# Patient Record
Sex: FEMALE | Race: BLACK OR AFRICAN AMERICAN | NOT HISPANIC OR LATINO | URBAN - METROPOLITAN AREA
[De-identification: names, ages, dates, MRNs, and addresses within clinical notes are randomized per-mention and may not be internally consistent; named-entity substitution may affect disease eponyms.]

---

## 2019-07-17 ENCOUNTER — HOSPITAL ENCOUNTER (EMERGENCY)
Facility: HOSPITAL | Age: 34
Discharge: HOME | End: 2019-07-17
Attending: EMERGENCY MEDICINE | Admitting: EMERGENCY MEDICINE

## 2019-07-17 VITALS
DIASTOLIC BLOOD PRESSURE: 74 MMHG | HEART RATE: 76 BPM | WEIGHT: 125 LBS | BODY MASS INDEX: 21.34 KG/M2 | HEIGHT: 64 IN | TEMPERATURE: 97.8 F | OXYGEN SATURATION: 98 % | SYSTOLIC BLOOD PRESSURE: 110 MMHG | RESPIRATION RATE: 18 BRPM

## 2019-07-17 DIAGNOSIS — S61.311A LACERATION OF LEFT INDEX FINGER WITHOUT FOREIGN BODY WITH DAMAGE TO NAIL, INITIAL ENCOUNTER: Primary | ICD-10-CM

## 2019-07-17 PROCEDURE — 90715 TDAP VACCINE 7 YRS/> IM: CPT | Performed by: EMERGENCY MEDICINE

## 2019-07-17 PROCEDURE — 99283 EMERGENCY DEPT VISIT LOW MDM: CPT | Performed by: EMERGENCY MEDICINE

## 2019-07-17 PROCEDURE — 90471 IMMUNIZATION ADMIN: CPT

## 2019-07-17 PROCEDURE — 99282 EMERGENCY DEPT VISIT SF MDM: CPT

## 2019-07-17 RX ADMIN — TETANUS TOXOID, REDUCED DIPHTHERIA TOXOID AND ACELLULAR PERTUSSIS VACCINE, ADSORBED 0.5 ML: 5; 2.5; 8; 8; 2.5 SUSPENSION INTRAMUSCULAR at 10:44

## 2019-07-17 NOTE — DISCHARGE INSTRUCTIONS
Please return if you worsening or other concerning symptoms otherwise follow up with occupational medicine as instructed as discussed

## 2019-07-17 NOTE — ED PROVIDER NOTES
History  Chief Complaint   Patient presents with    Finger Laceration     pt with finger laceration to left index finger      50-year-old female right-hand dominant out past medical history here for evaluation of left finger tip laceration  Patient was at work at [employer] she was cutting baking see accidentally sliced the distal tip of her left 2nd finger causing a superficial avulsion to the finger tip and distal nail  Her last tetanus shot was greater than 10 years ago she has some mild discomfort to the tip of her finger only she has no history of diabetes poor wound healing she has no weakness paresthesias or anesthesia her last menstrual period was 2 weeks ago she has no other injuries complaints or concerns otherwise denies a complete review systems as noted  This was identified is work related injury          None       History reviewed  No pertinent past medical history  Past Surgical History:   Procedure Laterality Date     SECTION         History reviewed  No pertinent family history  I have reviewed and agree with the history as documented  Social History     Tobacco Use    Smoking status: Never Smoker    Smokeless tobacco: Never Used   Substance Use Topics    Alcohol use: Never     Frequency: Never    Drug use: Never        Review of Systems   Constitutional: Negative for activity change, appetite change, fatigue and fever  Gastrointestinal: Negative for nausea and vomiting  Genitourinary: Negative for difficulty urinating and menstrual problem  Musculoskeletal: Negative for joint swelling, neck pain and neck stiffness  Left 2nd finger tip laceration   Skin: Positive for wound  Negative for color change, pallor and rash  Neurological: Negative for weakness, light-headedness and numbness  Hematological: Negative for adenopathy  Does not bruise/bleed easily  Psychiatric/Behavioral: Negative for agitation and behavioral problems     All other systems reviewed and are negative  Physical Exam  Physical Exam   Constitutional: She is oriented to person, place, and time  She appears well-developed and well-nourished  No distress  Very well no acute distress   HENT:   Head: Normocephalic and atraumatic  Eyes: Pupils are equal, round, and reactive to light  EOM are normal    Neck: Normal range of motion  Neck supple  No tracheal deviation present  Musculoskeletal:   Complete muscle skeletal exam significant for less than 1 cm complete avulsion of the finger tip of the left distal 2nd finger along the radial aspect of the finger involving the very distal radial aspect of the nail, the entire wound can be visualized is superficial cap refill is brisk sensations intact there is no subungual hematoma there is no active bleeding she has 5/5 strength flexion extension the DI P PIP and MCP joint no other acute ischemic infectious inflammatory traumatic findings on exam   Neurological: She is alert and oriented to person, place, and time  No cranial nerve deficit  She exhibits normal muscle tone  Coordination normal    Skin: Skin is warm and dry  No rash noted  Psychiatric: She has a normal mood and affect  Her behavior is normal    Nursing note and vitals reviewed        Vital Signs  ED Triage Vitals [07/17/19 0954]   Temperature Pulse Respirations Blood Pressure SpO2   97 8 °F (36 6 °C) 76 18 110/74 98 %      Temp Source Heart Rate Source Patient Position - Orthostatic VS BP Location FiO2 (%)   Oral Monitor Sitting Right arm --      Pain Score       5           Vitals:    07/17/19 0954   BP: 110/74   Pulse: 76   Patient Position - Orthostatic VS: Sitting         Visual Acuity      ED Medications  Medications   tetanus-diphtheria-acellular pertussis (BOOSTRIX) IM injection 0 5 mL (0 5 mL Intramuscular Given 7/17/19 1044)       Diagnostic Studies  Results Reviewed     None                 No orders to display              Procedures  Procedures       ED Course  ED Course as of Jul 17 1350   Wed Jul 17, 2019   1034 The patient's wound was cleaned covered with small piece of Surgicel nonstick dressing Occupational Medicine follow-up close return instructions agreeable plan                                  MDM    Disposition  Final diagnoses:   Laceration of left index finger without foreign body with damage to nail, initial encounter     Time reflects when diagnosis was documented in both MDM as applicable and the Disposition within this note     Time User Action Codes Description Comment    7/17/2019 10:35 AM Marco A Wheeler Add [S61 311A] Laceration of left index finger without foreign body with damage to nail, initial encounter       ED Disposition     ED Disposition Condition Date/Time Comment    Discharge Stable Wed Jul 17, 2019 10:35 AM Velora Appl discharge to home/self care  Follow-up Information     Follow up With Specialties Details Why Contact Info Additional [address] [facility] Emergency Department Emergency Medicine  If symptoms worsen [address] [facility] [phone]  [address] [place] [address] ED, [address] [place], [place], [address] [place] [address] Avenue  [address] [place] [address], [facility]          There are no discharge medications for this patient  No discharge procedures on file      ED Provider  Electronically Signed by           Jeri Hawkins DO  07/17/19 6218

## 2021-05-17 ENCOUNTER — APPOINTMENT (EMERGENCY)
Dept: RADIOLOGY | Facility: HOSPITAL | Age: 36
End: 2021-05-17
Payer: COMMERCIAL

## 2021-05-17 ENCOUNTER — HOSPITAL ENCOUNTER (EMERGENCY)
Facility: HOSPITAL | Age: 36
Discharge: HOME | End: 2021-05-17
Attending: EMERGENCY MEDICINE | Admitting: EMERGENCY MEDICINE
Payer: COMMERCIAL

## 2021-05-17 VITALS
SYSTOLIC BLOOD PRESSURE: 112 MMHG | RESPIRATION RATE: 18 BRPM | TEMPERATURE: 98.1 F | OXYGEN SATURATION: 98 % | DIASTOLIC BLOOD PRESSURE: 77 MMHG | HEART RATE: 76 BPM

## 2021-05-17 DIAGNOSIS — M25.561 RIGHT KNEE PAIN: Primary | ICD-10-CM

## 2021-05-17 PROCEDURE — 99284 EMERGENCY DEPT VISIT MOD MDM: CPT | Performed by: PHYSICIAN ASSISTANT

## 2021-05-17 PROCEDURE — 99283 EMERGENCY DEPT VISIT LOW MDM: CPT

## 2021-05-17 PROCEDURE — 73564 X-RAY EXAM KNEE 4 OR MORE: CPT

## 2021-05-17 NOTE — ED NOTES
Discharged reviewed with pt  Pt verbalized understanding and has no further questions at this time  Pt ambulatory off unit with steady gait       Robert Pelaez RN  05/17/21 2294

## 2021-05-17 NOTE — Clinical Note
Ignaciosandee Ulloa was seen and treated in our emergency department on 5/17/2021  Diagnosis:     Jose Hernandez  may return to work on return date  She may return on this date: 05/21/2021         If you have any questions or concerns, please don't hesitate to call        Quincy Baker PA-C    ______________________________           _______________          _______________  Hospital Representative                              Date                                Time

## 2021-05-17 NOTE — Clinical Note
Lolita Beauchamp was seen and treated in our emergency department on 5/17/2021  Diagnosis:     Jayden Monet  may return to work on return date  She may return on this date: 05/21/2021         If you have any questions or concerns, please don't hesitate to call        Satya Black PA-C    ______________________________           _______________          _______________  Hospital Representative                              Date                                Time

## 2021-05-18 NOTE — ED PROVIDER NOTES
History  Chief Complaint   Patient presents with    Knee Pain     R knee swelling and pain x 5 days, has hurt on and off for years, denies injury     38 yo with knee pain  Acute on chronic  States she's been having pain for years but recently worse  Swelling in right knee  Worsens by the end of the day  Pain with flexion and extension but worse on extension  Pain with weight bearing  Stands on feet for prolonged periods of time during the day while at work  No fever, chills, n/v  No rash or redness to joint  History provided by:  Patient   used: No    Knee Pain  Location:  Knee  Time since incident: "years"  Injury: no    Knee location:  R knee  Pain details:     Quality:  Aching    Radiates to:  Does not radiate    Severity:  Moderate    Onset quality:  Gradual    Duration:  1 week    Timing:  Constant    Progression:  Worsening  Chronicity:  New  Dislocation: no    Foreign body present:  No foreign bodies  Prior injury to area:  No  Relieved by:  Rest  Worsened by:  Bearing weight, flexion and extension  Ineffective treatments:  None tried  Associated symptoms: no back pain and no neck pain        None       History reviewed  No pertinent past medical history  Past Surgical History:   Procedure Laterality Date     SECTION         History reviewed  No pertinent family history  I have reviewed and agree with the history as documented  E-Cigarette/Vaping     E-Cigarette/Vaping Substances     Social History     Tobacco Use    Smoking status: Never Smoker    Smokeless tobacco: Never Used   Substance Use Topics    Alcohol use: Never     Frequency: Never    Drug use: Never       Review of Systems   Constitutional: Negative  HENT: Negative for dental problem, ear pain, hearing loss, nosebleeds, tinnitus and trouble swallowing  Eyes: Negative for photophobia, pain and visual disturbance     Respiratory: Negative for apnea, cough, choking, chest tightness, shortness of breath, wheezing and stridor  Gastrointestinal: Negative for abdominal pain, nausea and vomiting  Genitourinary: Negative for decreased urine volume and enuresis  Musculoskeletal: Positive for joint swelling (Right knee)  Negative for arthralgias, back pain, myalgias, neck pain and neck stiffness  Right knee pain   Skin: Negative for pallor, rash and wound  Allergic/Immunologic: Negative  Neurological: Negative for dizziness, syncope, speech difficulty, weakness, light-headedness, numbness and headaches  Physical Exam  Physical Exam  Vitals signs and nursing note reviewed  Constitutional:       General: She is not in acute distress  Appearance: She is well-developed  HENT:      Head: Normocephalic and atraumatic  Eyes:      Conjunctiva/sclera: Conjunctivae normal    Neck:      Musculoskeletal: Neck supple  Cardiovascular:      Rate and Rhythm: Normal rate and regular rhythm  Pulses:           Dorsalis pedis pulses are 2+ on the right side  Heart sounds: No murmur  Pulmonary:      Effort: Pulmonary effort is normal  No respiratory distress  Breath sounds: Normal breath sounds  Abdominal:      Palpations: Abdomen is soft  Tenderness: There is no abdominal tenderness  Musculoskeletal:      Right knee: She exhibits effusion (trace effusion)  She exhibits normal range of motion and no swelling  No tenderness found  No medial joint line, no lateral joint line and no MCL tenderness noted  Legs:    Skin:     General: Skin is warm and dry  Neurological:      Mental Status: She is alert           Vital Signs  ED Triage Vitals [05/17/21 1744]   Temperature Pulse Respirations Blood Pressure SpO2   98 1 °F (36 7 °C) 76 18 112/77 98 %      Temp Source Heart Rate Source Patient Position - Orthostatic VS BP Location FiO2 (%)   Oral Monitor Sitting Left arm --      Pain Score       --           Vitals:    05/17/21 1744   BP: 112/77   Pulse: 76   Patient Position - Orthostatic VS: Sitting         Visual Acuity      ED Medications  Medications - No data to display    Diagnostic Studies  Results Reviewed     None                 XR knee 4+ vw right injury   ED Interpretation by Halie Umanzor PA-C (05/17 1831)   No acute osseous abnormalities  Procedures  Procedures         ED Course                             SBIRT 20yo+      Most Recent Value   SBIRT (24 yo +)   In order to provide better care to our patients, we are screening all of our patients for alcohol and drug use  Would it be okay to ask you these screening questions? Yes Filed at: 05/17/2021 1809   Initial Alcohol Screen: US AUDIT-C    1  How often do you have a drink containing alcohol?  0 Filed at: 05/17/2021 1809   2  How many drinks containing alcohol do you have on a typical day you are drinking? 0 Filed at: 05/17/2021 1809   3b  FEMALE Any Age, or MALE 65+: How often do you have 4 or more drinks on one occassion? 0 Filed at: 05/17/2021 1809   Audit-C Score  0 Filed at: 05/17/2021 1809   JOSEMANUEL: How many times in the past year have you    Used an illegal drug or used a prescription medication for non-medical reasons? Never Filed at: 05/17/2021 1809                    MDM  Number of Diagnoses or Management Options  Right knee pain: new and requires workup  Diagnosis management comments: Differential diagnosis including but not limited to: sprain, strain, fracture, dislocation, contusion; doubt compartment syndrome, lyme, bursitis, arthritis  Plan: XR  Amount and/or Complexity of Data Reviewed  Tests in the radiology section of CPT®: ordered and reviewed  Independent visualization of images, tracings, or specimens: yes    Risk of Complications, Morbidity, and/or Mortality  Presenting problems: low  Management options: low  General comments: 38 yo with knee pain  Acute on chronic  Xr without acute abnormalities  Small effusion  Could be bursitis vs arthritis  Doubt septic joint  Discussed aspiration  At this time there is low suspicion for septic joint  Aspirating the joint for fluid could just result in recurrent effusion  Will have her f/u with ortho  Continue wearing compression stocking  Return parameters provided  Pt understands and agrees with plan  Disposition  Final diagnoses:   Right knee pain     Time reflects when diagnosis was documented in both MDM as applicable and the Disposition within this note     Time User Action Codes Description Comment    5/17/2021  6:36 PM Leilani Schaeffer Add [F36 245] Right knee pain       ED Disposition     ED Disposition Condition Date/Time Comment    Discharge Stable Mon May 17, 2021  6:36 PM Arron Blair discharge to home/self care  Follow-up Information     Follow up With Specialties Details Why Contact Info Additional [address] [facility] Call in 1 day  [address] [facility] [address] [facility] [phone] [facility] [address] [place], [address], [place], [place], [phone]          There are no discharge medications for this patient  No discharge procedures on file      PDMP Review     None          ED Provider  Electronically Signed by           Alvena Eisenmenger, PA-C  05/18/21 0009

## 2021-05-21 ENCOUNTER — OFFICE VISIT (OUTPATIENT)
Dept: OBGYN CLINIC | Facility: CLINIC | Age: 36
End: 2021-05-21
Payer: COMMERCIAL

## 2021-05-21 VITALS
WEIGHT: 136 LBS | DIASTOLIC BLOOD PRESSURE: 72 MMHG | HEIGHT: 64 IN | BODY MASS INDEX: 23.22 KG/M2 | SYSTOLIC BLOOD PRESSURE: 110 MMHG | HEART RATE: 68 BPM

## 2021-05-21 DIAGNOSIS — M25.561 ACUTE PAIN OF RIGHT KNEE: ICD-10-CM

## 2021-05-21 DIAGNOSIS — M22.2X1 PATELLOFEMORAL SYNDROME, RIGHT: Primary | ICD-10-CM

## 2021-05-21 PROCEDURE — 99203 OFFICE O/P NEW LOW 30 MIN: CPT | Performed by: ORTHOPAEDIC SURGERY

## 2021-05-21 PROCEDURE — 20610 DRAIN/INJ JOINT/BURSA W/O US: CPT | Performed by: ORTHOPAEDIC SURGERY

## 2021-05-21 RX ORDER — BUPIVACAINE HYDROCHLORIDE 2.5 MG/ML
2 INJECTION, SOLUTION INFILTRATION; PERINEURAL
Status: COMPLETED | OUTPATIENT
Start: 2021-05-21 | End: 2021-05-21

## 2021-05-21 RX ORDER — MELOXICAM 7.5 MG/1
7.5 TABLET ORAL DAILY
Qty: 30 TABLET | Refills: 1 | Status: SHIPPED | OUTPATIENT
Start: 2021-05-21 | End: 2021-08-15

## 2021-05-21 RX ORDER — METHYLPREDNISOLONE ACETATE 40 MG/ML
2 INJECTION, SUSPENSION INTRA-ARTICULAR; INTRALESIONAL; INTRAMUSCULAR; SOFT TISSUE
Status: COMPLETED | OUTPATIENT
Start: 2021-05-21 | End: 2021-05-21

## 2021-05-21 RX ADMIN — BUPIVACAINE HYDROCHLORIDE 2 ML: 2.5 INJECTION, SOLUTION INFILTRATION; PERINEURAL at 13:28

## 2021-05-21 RX ADMIN — METHYLPREDNISOLONE ACETATE 2 ML: 40 INJECTION, SUSPENSION INTRA-ARTICULAR; INTRALESIONAL; INTRAMUSCULAR; SOFT TISSUE at 13:28

## 2021-05-21 NOTE — PROGRESS NOTES
Orthopaedics Office Visit - New Patient Visit    ASSESSMENT/PLAN:    Assessment:   Right Knee Patellofemoral Syndrome     Plan:   -Discussed with the patient that this can be treated with formal physical therapy, steroid injection and anti inflammatories  -A script for physical therapy was provided to the patient at today's visit  -A script for Meloxicam was sent into pharmacy  Injection given to right knee joint today, tolerated well    To Do Next Visit:  Follow-up in 6-8 weeks     _____________________________________________________  CHIEF COMPLAINT:  Chief Complaint   Patient presents with    Right Knee - Pain         SUBJECTIVE:  Pushpa Ibarra is a 39 y o  female who presents to the office today for an initial evaluation of her right knee  She states that her right knee pain started years ago with no specific mechanism of injury  She states that she works as a  and is on her feet for long periods of time and will experience dull achy pain over the anterior aspect of her right knee  She states that she carries the tray in her right hand  She states that her stay knee will swell at times  She states that she feels a pressure in her knee  She states that she will only experience pain when she is working  She states that she uses a joint medication that is purchased from [employer] to help alleviate her pain  She denies any distal paresthesia  PAST MEDICAL HISTORY:  History reviewed  No pertinent past medical history  PAST SURGICAL HISTORY:  Past Surgical History:   Procedure Laterality Date     SECTION         FAMILY HISTORY:  No family history on file  SOCIAL HISTORY:  Social History     Tobacco Use    Smoking status: Never Smoker    Smokeless tobacco: Never Used   Substance Use Topics    Alcohol use: Never     Frequency: Never    Drug use: Never       MEDICATIONS:  No current outpatient medications on file      ALLERGIES:  No Known Allergies    REVIEW OF SYSTEMS:  MSK: Right Knee pain  Neuro: Intact  Pertinent items are otherwise noted in HPI  A comprehensive review of systems was otherwise negative  LABS:  HgA1c: No results found for: HGBA1C  BMP: No results found for: GLUCOSE, CALCIUM, NA, K, CO2, CL, BUN, CREATININE  CBC: No components found for: CBC    _____________________________________________________  PHYSICAL EXAMINATION:  Vital signs: Ht 5' 4" (1 626 m)   Wt 61 7 kg (136 lb)   BMI 23 34 kg/m²   General: No acute distress, awake and alert  Psychiatric: Mood and affect appear appropriate  HEENT: Trachea Midline, No torticollis, no apparent facial trauma  Cardiovascular: No audible murmurs; Extremities appear perfused  Pulmonary: No audible wheezing or stridor  Skin: No open lesions; see further details (if any) below    MUSCULOSKELETAL EXAMINATION:  Extremities:  Right Knee  TTP patella   No joint line tenderness  Range of motion 0-120 degrees  No crepitus upon evaluation  Negative valgus  Negative Varus  Negative Edwina's  Valgus alignment to bilateral knees  +PFG    _____________________________________________________  STUDIES REVIEWED:  I personally reviewed the images and interpretation is as follows:    X-ray's taken on 5/17/2021 of her right knee demonstrates no acute fracture, dislocation, lytic or blastic lesion  Joint space is well maintained  PROCEDURES PERFORMED:  Large joint arthrocentesis: R knee  Universal Protocol:  Consent: Verbal consent obtained    Risks and benefits: risks, benefits and alternatives were discussed  Consent given by: patient  Site marked: the operative site was not marked  Supporting Documentation  Indications: pain and joint swelling   Procedure Details  Location: knee - R knee  Needle size: 22 G  Ultrasound guidance: no  Approach: anteromedial  Medications administered: 2 mL bupivacaine 0 25 %; 2 mL methylPREDNISolone acetate 40 mg/mL    Patient tolerance: patient tolerated the procedure well with no immediate complications  Dressing: Sterile dressing applied          Scribe Attestation    I,:  Hilaria Levi am acting as a scribe while in the presence of the attending physician :       I,:  Navya Sloan MD personally performed the services described in this documentation    as scribed in my presence :

## 2021-07-13 ENCOUNTER — APPOINTMENT (OUTPATIENT)
Dept: MRI IMAGING | Facility: HOSPITAL | Age: 36
DRG: 043 | End: 2021-07-13
Payer: COMMERCIAL

## 2021-07-13 ENCOUNTER — HOSPITAL ENCOUNTER (INPATIENT)
Facility: HOSPITAL | Age: 36
LOS: 4 days | Discharge: HOME | DRG: 043 | End: 2021-07-18
Attending: EMERGENCY MEDICINE | Admitting: INTERNAL MEDICINE
Payer: COMMERCIAL

## 2021-07-13 ENCOUNTER — APPOINTMENT (EMERGENCY)
Dept: CT IMAGING | Facility: HOSPITAL | Age: 36
DRG: 043 | End: 2021-07-13
Payer: COMMERCIAL

## 2021-07-13 DIAGNOSIS — R93.0 ABNORMAL CT OF THE HEAD: ICD-10-CM

## 2021-07-13 DIAGNOSIS — G35 MS (MULTIPLE SCLEROSIS) (HCC): ICD-10-CM

## 2021-07-13 DIAGNOSIS — R53.1 WEAKNESS: Primary | ICD-10-CM

## 2021-07-13 PROBLEM — R20.2 PARESTHESIA AND PAIN OF RIGHT EXTREMITY: Status: ACTIVE | Noted: 2021-07-13

## 2021-07-13 PROBLEM — M79.609 PARESTHESIA AND PAIN OF RIGHT EXTREMITY: Status: ACTIVE | Noted: 2021-07-13

## 2021-07-13 LAB
ALBUMIN SERPL BCP-MCNC: 3.6 G/DL (ref 3.5–5)
ALP SERPL-CCNC: 50 U/L (ref 46–116)
ALT SERPL W P-5'-P-CCNC: 17 U/L (ref 12–78)
ANION GAP SERPL CALCULATED.3IONS-SCNC: 7 MMOL/L (ref 4–13)
AST SERPL W P-5'-P-CCNC: 13 U/L (ref 5–45)
BASOPHILS # BLD AUTO: 0.03 THOUSANDS/ΜL (ref 0–0.1)
BASOPHILS NFR BLD AUTO: 0 % (ref 0–1)
BILIRUB SERPL-MCNC: 0.37 MG/DL (ref 0.2–1)
BUN SERPL-MCNC: 11 MG/DL (ref 5–25)
CALCIUM SERPL-MCNC: 8.7 MG/DL (ref 8.3–10.1)
CHLORIDE SERPL-SCNC: 105 MMOL/L (ref 100–108)
CO2 SERPL-SCNC: 28 MMOL/L (ref 21–32)
CREAT SERPL-MCNC: 0.8 MG/DL (ref 0.6–1.3)
EOSINOPHIL # BLD AUTO: 0.07 THOUSAND/ΜL (ref 0–0.61)
EOSINOPHIL NFR BLD AUTO: 1 % (ref 0–6)
ERYTHROCYTE [DISTWIDTH] IN BLOOD BY AUTOMATED COUNT: 16.1 % (ref 11.6–15.1)
GFR SERPL CREATININE-BSD FRML MDRD: 109 ML/MIN/1.73SQ M
GLUCOSE SERPL-MCNC: 87 MG/DL (ref 65–140)
GLUCOSE SERPL-MCNC: 96 MG/DL (ref 65–140)
HCT VFR BLD AUTO: 40.4 % (ref 34.8–46.1)
HGB BLD-MCNC: 12.3 G/DL (ref 11.5–15.4)
IMM GRANULOCYTES # BLD AUTO: 0.01 THOUSAND/UL (ref 0–0.2)
IMM GRANULOCYTES NFR BLD AUTO: 0 % (ref 0–2)
LYMPHOCYTES # BLD AUTO: 1.68 THOUSANDS/ΜL (ref 0.6–4.47)
LYMPHOCYTES NFR BLD AUTO: 25 % (ref 14–44)
MCH RBC QN AUTO: 22.2 PG (ref 26.8–34.3)
MCHC RBC AUTO-ENTMCNC: 30.4 G/DL (ref 31.4–37.4)
MCV RBC AUTO: 73 FL (ref 82–98)
MONOCYTES # BLD AUTO: 0.42 THOUSAND/ΜL (ref 0.17–1.22)
MONOCYTES NFR BLD AUTO: 6 % (ref 4–12)
NEUTROPHILS # BLD AUTO: 4.6 THOUSANDS/ΜL (ref 1.85–7.62)
NEUTS SEG NFR BLD AUTO: 68 % (ref 43–75)
NRBC BLD AUTO-RTO: 0 /100 WBCS
PLATELET # BLD AUTO: 262 THOUSANDS/UL (ref 149–390)
PMV BLD AUTO: 10.1 FL (ref 8.9–12.7)
POTASSIUM SERPL-SCNC: 3.8 MMOL/L (ref 3.5–5.3)
PROT SERPL-MCNC: 7.9 G/DL (ref 6.4–8.2)
RBC # BLD AUTO: 5.53 MILLION/UL (ref 3.81–5.12)
SODIUM SERPL-SCNC: 140 MMOL/L (ref 136–145)
WBC # BLD AUTO: 6.81 THOUSAND/UL (ref 4.31–10.16)

## 2021-07-13 PROCEDURE — 99285 EMERGENCY DEPT VISIT HI MDM: CPT

## 2021-07-13 PROCEDURE — 82948 REAGENT STRIP/BLOOD GLUCOSE: CPT

## 2021-07-13 PROCEDURE — 93005 ELECTROCARDIOGRAM TRACING: CPT

## 2021-07-13 PROCEDURE — 70450 CT HEAD/BRAIN W/O DYE: CPT

## 2021-07-13 PROCEDURE — 80053 COMPREHEN METABOLIC PANEL: CPT | Performed by: EMERGENCY MEDICINE

## 2021-07-13 PROCEDURE — A9585 GADOBUTROL INJECTION: HCPCS | Performed by: INTERNAL MEDICINE

## 2021-07-13 PROCEDURE — 36415 COLL VENOUS BLD VENIPUNCTURE: CPT | Performed by: EMERGENCY MEDICINE

## 2021-07-13 PROCEDURE — 85025 COMPLETE CBC W/AUTO DIFF WBC: CPT | Performed by: EMERGENCY MEDICINE

## 2021-07-13 PROCEDURE — 70553 MRI BRAIN STEM W/O & W/DYE: CPT

## 2021-07-13 PROCEDURE — 99220 PR INITIAL OBSERVATION CARE/DAY 70 MINUTES: CPT | Performed by: STUDENT IN AN ORGANIZED HEALTH CARE EDUCATION/TRAINING PROGRAM

## 2021-07-13 PROCEDURE — G1004 CDSM NDSC: HCPCS

## 2021-07-13 PROCEDURE — 99285 EMERGENCY DEPT VISIT HI MDM: CPT | Performed by: EMERGENCY MEDICINE

## 2021-07-13 RX ORDER — LIDOCAINE 50 MG/G
1 PATCH TOPICAL DAILY PRN
Status: DISCONTINUED | OUTPATIENT
Start: 2021-07-13 | End: 2021-07-18 | Stop reason: HOSPADM

## 2021-07-13 RX ORDER — ACETAMINOPHEN 325 MG/1
650 TABLET ORAL EVERY 6 HOURS PRN
Status: DISCONTINUED | OUTPATIENT
Start: 2021-07-13 | End: 2021-07-18 | Stop reason: HOSPADM

## 2021-07-13 RX ADMIN — ENOXAPARIN SODIUM 40 MG: 40 INJECTION SUBCUTANEOUS at 14:07

## 2021-07-13 RX ADMIN — GADOBUTROL 6 ML: 604.72 INJECTION INTRAVENOUS at 15:41

## 2021-07-13 NOTE — PLAN OF CARE
Problem: NEUROSENSORY - ADULT  Goal: Achieves stable or improved neurological status  Description: INTERVENTIONS  - Monitor and report changes in neurological status  - Monitor vital signs such as temperature, blood pressure, glucose, and any other labs ordered   - Initiate measures to prevent increased intracranial pressure  - Monitor for seizure activity and implement precautions if appropriate      7/13/2021 1955 by Jerardo Carlos RN  Outcome: Progressing  7/13/2021 1955 by Jerardo Carlos RN  Outcome: Progressing  Goal: Achieves maximal functionality and self care  Description: INTERVENTIONS  - Monitor swallowing and airway patency with patient fatigue and changes in neurological status  - Encourage and assist patient to increase activity and self care     - Encourage visually impaired, hearing impaired and aphasic patients to use assistive/communication devices  7/13/2021 1955 by Jerardo Carlos RN  Outcome: Progressing  7/13/2021 1955 by Jerardo Carlos RN  Outcome: Progressing     Problem: PAIN - ADULT  Goal: Verbalizes/displays adequate comfort level or baseline comfort level  Description: Interventions:  - Encourage patient to monitor pain and request assistance  - Assess pain using appropriate pain scale  - Administer analgesics based on type and severity of pain and evaluate response  - Implement non-pharmacological measures as appropriate and evaluate response  - Consider cultural and social influences on pain and pain management  - Notify physician/advanced practitioner if interventions unsuccessful or patient reports new pain  7/13/2021 1955 by Jerardo Carlos RN  Outcome: Progressing  7/13/2021 1955 by Jerardo Carlos RN  Outcome: Progressing     Problem: DISCHARGE PLANNING  Goal: Discharge to home or other facility with appropriate resources  Description: INTERVENTIONS:  - Identify barriers to discharge w/patient and caregiver  - Arrange for needed discharge resources and transportation as appropriate  - Identify discharge learning needs (meds, wound care, etc )  - Arrange for interpretive services to assist at discharge as needed  - Refer to Case Management Department for coordinating discharge planning if the patient needs post-hospital services based on physician/advanced practitioner order or complex needs related to functional status, cognitive ability, or social support system  7/13/2021 1955 by Halina Taveras RN  Outcome: Progressing  7/13/2021 1955 by Halina Taveras RN  Outcome: Progressing     Problem: Knowledge Deficit  Goal: Patient/family/caregiver demonstrates understanding of disease process, treatment plan, medications, and discharge instructions  Description: Complete learning assessment and assess knowledge base    Interventions:  - Provide teaching at level of understanding  - Provide teaching via preferred learning methods  7/13/2021 1955 by Halina Taveras RN  Outcome: Progressing  7/13/2021 1955 by Halina Taveras RN  Outcome: Progressing

## 2021-07-13 NOTE — PLAN OF CARE
Problem: NEUROSENSORY - ADULT  Goal: Achieves stable or improved neurological status  Description: INTERVENTIONS  - Monitor and report changes in neurological status  - Monitor vital signs such as temperature, blood pressure, glucose, and any other labs ordered   - Initiate measures to prevent increased intracranial pressure  - Monitor for seizure activity and implement precautions if appropriate      Outcome: Progressing  Goal: Achieves maximal functionality and self care  Description: INTERVENTIONS  - Monitor swallowing and airway patency with patient fatigue and changes in neurological status  - Encourage and assist patient to increase activity and self care     - Encourage visually impaired, hearing impaired and aphasic patients to use assistive/communication devices  Outcome: Progressing     Problem: PAIN - ADULT  Goal: Verbalizes/displays adequate comfort level or baseline comfort level  Description: Interventions:  - Encourage patient to monitor pain and request assistance  - Assess pain using appropriate pain scale  - Administer analgesics based on type and severity of pain and evaluate response  - Implement non-pharmacological measures as appropriate and evaluate response  - Consider cultural and social influences on pain and pain management  - Notify physician/advanced practitioner if interventions unsuccessful or patient reports new pain  Outcome: Progressing     Problem: DISCHARGE PLANNING  Goal: Discharge to home or other facility with appropriate resources  Description: INTERVENTIONS:  - Identify barriers to discharge w/patient and caregiver  - Arrange for needed discharge resources and transportation as appropriate  - Identify discharge learning needs (meds, wound care, etc )  - Arrange for interpretive services to assist at discharge as needed  - Refer to Case Management Department for coordinating discharge planning if the patient needs post-hospital services based on physician/advanced practitioner order or complex needs related to functional status, cognitive ability, or social support system  Outcome: Progressing     Problem: Knowledge Deficit  Goal: Patient/family/caregiver demonstrates understanding of disease process, treatment plan, medications, and discharge instructions  Description: Complete learning assessment and assess knowledge base    Interventions:  - Provide teaching at level of understanding  - Provide teaching via preferred learning methods  Outcome: Progressing

## 2021-07-13 NOTE — ED PROVIDER NOTES
Pt Name: Jessica Archibald  MRN: [id number]  [place] 1985  Age/Sex: 39 y o  female  Date of evaluation: 2021  PCP: Mariluz Mak MD    [address] [place] [address]    Chief Complaint   Patient presents with    Numbness     Patient c/o right arm and leg numbness for the last 4 days  Patient states 3 episodes yesterday and 2 the day before  Patient states symptoms come and go  Patient states she experienced similar symptoms in 2018  HPI    39 y o  female presenting with right arm and leg numbness and weakness over the last 4 days  Patient states that the symptoms, with no clear provocation, last 30 seconds to 2 minute, then resolved  States she had 2 episodes 2 days ago, 3 episodes yesterday, 4 episodes so far today  She notes a history of similar symptoms 3 years ago, states that she never sought care and resolved after about 2 months  She denies fever, nausea, vomiting, diarrhea changes in speech or vision, trauma, headache, other symptoms  HPI      Past Medical and Surgical History    History reviewed  No pertinent past medical history  Past Surgical History:   Procedure Laterality Date     SECTION         History reviewed  No pertinent family history  Social History     Tobacco Use    Smoking status: Never Smoker    Smokeless tobacco: Never Used   Substance Use Topics    Alcohol use: Never    Drug use: Never           Allergies    No Known Allergies    Home Medications    Prior to Admission medications    Medication Sig Start Date End Date Taking? Authorizing Provider   meloxicam (MOBIC) 7 5 mg tablet Take 1 tablet (7 5 mg total) by mouth daily 21   Otto Meraz MD           Review of Systems    Review of Systems   Constitutional: Negative for activity change, chills and fever  HENT: Negative for drooling and facial swelling  Eyes: Negative for pain, discharge and visual disturbance     Respiratory: Negative for apnea, cough, chest tightness, shortness of breath and wheezing  Cardiovascular: Negative for chest pain and leg swelling  Gastrointestinal: Negative for abdominal pain, constipation, diarrhea, nausea and vomiting  Genitourinary: Negative for difficulty urinating, dysuria and urgency  Musculoskeletal: Negative for arthralgias, back pain and gait problem  Skin: Negative for color change and rash  Neurological: Positive for weakness and numbness  Negative for dizziness, speech difficulty and headaches  Psychiatric/Behavioral: Negative for agitation, behavioral problems and confusion  All other systems reviewed and negative  Physical Exam      ED Triage Vitals   Temperature Pulse Respirations Blood Pressure SpO2   07/13/21 0835 07/13/21 0835 07/13/21 0835 07/13/21 0835 07/13/21 0835   98 7 °F (37 1 °C) 72 18 126/68 100 %      Temp Source Heart Rate Source Patient Position - Orthostatic VS BP Location FiO2 (%)   07/13/21 0835 07/13/21 0835 07/13/21 0835 07/13/21 0835 --   Oral Monitor Sitting Left arm       Pain Score       07/13/21 1300       No Pain               Physical Exam  Vitals and nursing note reviewed  Constitutional:       Appearance: She is well-developed  HENT:      Head: Normocephalic and atraumatic  Right Ear: External ear normal       Left Ear: External ear normal    Eyes:      Conjunctiva/sclera: Conjunctivae normal       Pupils: Pupils are equal, round, and reactive to light  Cardiovascular:      Rate and Rhythm: Normal rate and regular rhythm  Heart sounds: Normal heart sounds  Pulmonary:      Effort: Pulmonary effort is normal  No respiratory distress  Breath sounds: Normal breath sounds  No wheezing or rales  Abdominal:      General: There is no distension  Palpations: Abdomen is soft  Tenderness: There is no abdominal tenderness  There is no guarding or rebound  Musculoskeletal:         General: No deformity  Normal range of motion        Cervical back: Normal range of motion and neck supple  Skin:     General: Skin is warm and dry  Findings: No erythema or rash  Neurological:      Mental Status: She is alert and oriented to person, place, and time  Cranial Nerves: No cranial nerve deficit  Sensory: No sensory deficit  Motor: No weakness  Coordination: Coordination normal       Gait: Gait normal    Psychiatric:         Behavior: Behavior normal          Thought Content:  Thought content normal          Judgment: Judgment normal               Diagnostic Results      Labs:    Results Reviewed     Procedure Component Value Units Date/Time    POCT pregnancy, urine [[id number]]     Lab Status: No result     Comprehensive metabolic panel [[id number]] Collected: 07/13/21 1015    Lab Status: Final result Specimen: Blood from Arm, Right Updated: 07/13/21 1106     Sodium 140 mmol/L      Potassium 3 8 mmol/L      Chloride 105 mmol/L      CO2 28 mmol/L      ANION GAP 7 mmol/L      BUN 11 mg/dL      Creatinine 0 80 mg/dL      Glucose 96 mg/dL      Calcium 8 7 mg/dL      AST 13 U/L      ALT 17 U/L      Alkaline Phosphatase 50 U/L      Total Protein 7 9 g/dL      Albumin 3 6 g/dL      Total Bilirubin 0 37 mg/dL      eGFR 109 ml/min/1 73sq m     Narrative:      Roberto guidelines for Chronic Kidney Disease (CKD):     Stage 1 with normal or high GFR (GFR > 90 mL/min/1 73 square meters)    Stage 2 Mild CKD (GFR = 60-89 mL/min/1 73 square meters)    Stage 3A Moderate CKD (GFR = 45-59 mL/min/1 73 square meters)    Stage 3B Moderate CKD (GFR = 30-44 mL/min/1 73 square meters)    Stage 4 Severe CKD (GFR = 15-29 mL/min/1 73 square meters)    Stage 5 End Stage CKD (GFR <15 mL/min/1 73 square meters)  Note: GFR calculation is accurate only with a steady state creatinine    CBC and differential [[id number]]  (Abnormal) Collected: 07/13/21 1015    Lab Status: Final result Specimen: Blood from Arm, Right Updated: 07/13/21 1030     WBC 6 81 Thousand/uL      RBC 5 53 Million/uL      Hemoglobin 12 3 g/dL      Hematocrit 40 4 %      MCV 73 fL      MCH 22 2 pg      MCHC 30 4 g/dL      RDW 16 1 %      MPV 10 1 fL      Platelets 424 Thousands/uL      nRBC 0 /100 WBCs      Neutrophils Relative 68 %      Immat GRANS % 0 %      Lymphocytes Relative 25 %      Monocytes Relative 6 %      Eosinophils Relative 1 %      Basophils Relative 0 %      Neutrophils Absolute 4 60 Thousands/µL      Immature Grans Absolute 0 01 Thousand/uL      Lymphocytes Absolute 1 68 Thousands/µL      Monocytes Absolute 0 42 Thousand/µL      Eosinophils Absolute 0 07 Thousand/µL      Basophils Absolute 0 03 Thousands/µL           All labs reviewed and utilized in the medical decision making process    Radiology:    CT head without contrast   Final Result      No acute intracranial abnormality  Few scattered foci of periventricular white matter low attenuation  In this age group, this is nonspecific, and may be secondary to microangiopathic change, migraines, a post infectious/post inflammatory in etiology, or demyelinating disease, among    other causes  Given the clinical history, a demyelinating disease is of some concern  Recommend neurology evaluation an MRI of the brain when clinically appropriate  The study was marked in [facility] for immediate notification  Workstation performed: [id number]         MRI brain w wo contrast    (Results Pending)       All radiology studies independently viewed by me and interpreted by the radiologist     Procedure    Procedures        ED Course of Care and Re-Assessments      Lab workup reassuring, CT abnormal as above  Discussed case with Dr Maite Boyle of Neurology who recommended admission for EEG and MRI brain with and without with concern for demyelinating disease versus possible complex partial seizures  Patient amenable to that plan      Medications   enoxaparin (LOVENOX) subcutaneous injection 40 mg (40 mg Subcutaneous Given 7/13/21 7487)   lidocaine (LIDODERM) 5 % patch 1 patch (has no administration in time range)   Gadobutrol injection (SINGLE-DOSE) SOLN 6 mL (6 mL Intravenous Given 7/13/21 1541)           FINAL IMPRESSION    Final diagnoses:   Weakness   Abnormal CT of the head         DISPOSITION/PLAN    Presentation as above with weakness and numbness and abnormal CT of the head  Some concern for demyelinating disease including MS verses possible complex partial seizures with non organic cause also on differential   After initial workup and observation emergency department, admitted Internal Medicine for further care with Neurology consult, hemodynamically stable comfortable that time  Time reflects when diagnosis was documented in both MDM as applicable and the Disposition within this note     Time User Action Codes Description Comment    7/13/2021 11:22 AM Massiel CRAFT Add [R53 1] Weakness     7/13/2021 11:22 AM Massiel CRAFT Add [R93 0] Abnormal CT of the head       ED Disposition     ED Disposition Condition Date/Time Comment    Admit Stable Tue Jul 13, 2021 11:42 AM Case was discussed with JOAN and the patient's admission status was agreed to be Admission Status: observation status to the service of Dr Mariza Rust   Follow-up Information    None           PATIENT REFERRED TO:    No follow-up provider specified  DISCHARGE MEDICATIONS:    Patient's Medications   Discharge Prescriptions    No medications on file       No discharge procedures on file           MD Mona Woo MD  07/13/21 1894

## 2021-07-13 NOTE — H&P
[address] [facility]  H&P- [facility] 1985, 39 y o  female MRN: [id number]  Unit/Bed#: ED 09 Encounter: [id number]  Primary Care Provider: Cornell Harris MD   Date and time admitted to hospital: 7/13/2021  9:41 AM    * Paresthesia and pain of right upper and lower extremity  Assessment & Plan   Presented with complaint of intermittent right sided parasthesia and hemiparesis x 5 days  o episodic, lasting approximately few seconds to 1 minute  o Progressively increasing in frequency, with 6 episodes today  o spontaneously resolve  o first starts in fingers/R  knee and then radiate up the limb  o Associated with cramping in fingers of the R UE    o Asymptomatic at time of presentation   Episodes Not associated with headache, dizziness, LOC, changes in vision, chest pain, nausea, vomiting, diaphoresis, no loss of bowel or bladder control during these episodes; denies any typical aura like symptoms   Reports had similar issue in 2018; episodes persisted for a few months and then spontaneously resolved, have not reoccurred until the last 5 days   Neuro exam findings: A x O x 3; no focal neurological deficits during my visit   CT head findings: no acute intracranial abnormality; few scattered foci of periventricular white matter low attenuation   Possible MS? vs complex partial seizure?  MRI brain w/wo contrast ordered   [facility] Neu[facility] consulted by ED provider; per hand off, neuro order has recommended MRI w/wo contrast and EEG  o Order placed for MRI and EEG; pending   Official neuro eval pending          Patellofemoral syndrome, right  Assessment & Plan  · Known  · Stable    VTE Prophylaxis: Enoxaparin (Lovenox)  / sequential compression device   Code Status: Full code  POLST: POLST form is not discussed and not completed at this time    Discussion with family:  present at bedside    Anticipated Length of Stay:  Patient will be admitted on an Observation basis with an anticipated length of stay of  Less than 2 midnights  Justification for Hospital Stay: r/o seizure vs demyelinating disorder     Total Time for Visit, including Counseling / Coordination of Care: 45 minutes  Greater than 50% of this total time spent on direct patient counseling and coordination of care  Chief Complaint:   R  Sided UE and LE parasthesias and weakness    History of Present Illness:    Yordan Pleitez is a 39 y o  female with no significant PMH who presents with c/o intermittent R  Sided UE and LE paraesthesias and weakness  Episodes last a few seconds to usually a minute  Describes the episodes as starting in the fingers of R UE and R  Knee, then progressively ascend until entire limb is effected  Denies any headache, neck pain, LOC, dizziness, loss of bowel or bladder control, vision abnormalities, chest pain, nausea, vomiting associated with these episodes  Episodes usually spontaneously resolve  She has not noticed any alleviating or aggravating factors  Has had similar episodes approximately 2 years ago  Those episodes spontaneously resolved and have not reoccurred until this time  No Previous history of seizures or multiple sclerosis  No similar complaints in family  Review of Systems:    Review of Systems   Constitutional: Negative for appetite change, fever and unexpected weight change  HENT: Negative for congestion and sinus pain  Eyes: Negative for visual disturbance  Respiratory: Negative for cough, chest tightness and shortness of breath  Cardiovascular: Negative for chest pain, palpitations and leg swelling  Gastrointestinal: Negative for abdominal pain, constipation, diarrhea, nausea and vomiting  Genitourinary: Negative for dysuria  Musculoskeletal: Negative for back pain, joint swelling, myalgias, neck pain and neck stiffness  Neurological: Positive for weakness and numbness  Negative for dizziness and headaches     Psychiatric/Behavioral: Negative for agitation, behavioral problems and confusion  Past Medical and Surgical History:     History reviewed  No pertinent past medical history  Past Surgical History:   Procedure Laterality Date     SECTION         Meds/Allergies:    Prior to Admission medications    Medication Sig Start Date End Date Taking? Authorizing Provider   meloxicam (MOBIC) 7 5 mg tablet Take 1 tablet (7 5 mg total) by mouth daily 21   Sakshi Singh MD     I have reviewed home medications with patient personally  Allergies: No Known Allergies    Social History:     Marital Status: /Civil Union   Occupation:    Patient Pre-hospital Living Situation:  Home  Patient Pre-hospital Level of Mobility:  Independent  Patient Pre-hospital Diet Restrictions:  None  Substance Use History:   Social History     Substance and Sexual Activity   Alcohol Use Never     Social History     Tobacco Use   Smoking Status Never Smoker   Smokeless Tobacco Never Used     Social History     Substance and Sexual Activity   Drug Use Never       Family History:    History reviewed  No pertinent family history  Physical Exam:     Vitals:   Blood Pressure: 126/68 (21)  Pulse: 72 (21)  Temperature: 98 7 °F (37 1 °C) (21)  Temp Source: Oral (21)  Respirations: 18 (21)  Height: 5' 1" (154 9 cm) (21)  Weight - Scale: 62 1 kg (137 lb) (21)  SpO2: 100 % (21)    Physical Exam  Constitutional:       General: She is not in acute distress  Appearance: She is not ill-appearing or toxic-appearing  HENT:      Head: Normocephalic and atraumatic  Eyes:      General: No scleral icterus  Right eye: No discharge  Left eye: No discharge  Extraocular Movements: Extraocular movements intact  Cardiovascular:      Rate and Rhythm: Normal rate and regular rhythm  Pulses: Normal pulses  Heart sounds: Normal heart sounds     Pulmonary:      Effort: Pulmonary effort is normal       Breath sounds: Normal breath sounds  Abdominal:      General: Bowel sounds are normal  There is no distension  Palpations: Abdomen is soft  Tenderness: There is no abdominal tenderness  Musculoskeletal:         General: Normal range of motion  Cervical back: Normal range of motion  Skin:     General: Skin is warm and dry  Neurological:      Mental Status: She is alert and oriented to person, place, and time  Mental status is at baseline  Comments: Alert and oriented  Gait WNL  Motor at this time: 5/5 in bilateral UE and LE  No sensory deficits currently   Psychiatric:         Mood and Affect: Mood normal          Thought Content: Thought content normal          Additional Data:     Lab Results: I have personally reviewed pertinent reports  Results from last 7 days   Lab Units 07/13/21  1015   WBC Thousand/uL 6 81   HEMOGLOBIN g/dL 12 3   HEMATOCRIT % 40 4   PLATELETS Thousands/uL 262   NEUTROS PCT % 68   LYMPHS PCT % 25   MONOS PCT % 6   EOS PCT % 1     Results from last 7 days   Lab Units 07/13/21  1015   SODIUM mmol/L 140   POTASSIUM mmol/L 3 8   CHLORIDE mmol/L 105   CO2 mmol/L 28   BUN mg/dL 11   CREATININE mg/dL 0 80   ANION GAP mmol/L 7   CALCIUM mg/dL 8 7   ALBUMIN g/dL 3 6   TOTAL BILIRUBIN mg/dL 0 37   ALK PHOS U/L 50   ALT U/L 17   AST U/L 13   GLUCOSE RANDOM mg/dL 96                       Imaging: I have personally reviewed pertinent reports  CT head without contrast   Final Result by Jaziel Bowen DO (07/13 1100)      No acute intracranial abnormality  Few scattered foci of periventricular white matter low attenuation  In this age group, this is nonspecific, and may be secondary to microangiopathic change, migraines, a post infectious/post inflammatory in etiology, or demyelinating disease, among    other causes  Given the clinical history, a demyelinating disease is of some concern    Recommend neurology evaluation an MRI of the brain when clinically appropriate  The study was marked in [facility] for immediate notification  Workstation performed: [id number]         MRI brain w wo contrast    (Results Pending)       EKG, Pathology, and Other Studies Reviewed on Admission:   · EKG: no acute ST-T wave changes    Allscripts / Epic Records Reviewed: Yes     ** Please Note: This note has been constructed using a voice recognition system   **

## 2021-07-13 NOTE — ASSESSMENT & PLAN NOTE
 Presented with complaint of intermittent right sided parasthesia and hemiparesis x 5 days  o episodic, lasting approximately few seconds to 1 minute  o Progressively increasing in frequency, with 6 episodes today  o spontaneously resolve  o first starts in fingers/R  knee and then radiate up the limb  o Associated with cramping in fingers of the R UE    o Asymptomatic at time of presentation   Episodes Not associated with headache, dizziness, LOC, changes in vision, chest pain, nausea, vomiting, diaphoresis, no loss of bowel or bladder control during these episodes; denies any typical aura like symptoms   Reports had similar issue in 2018; episodes persisted for a few months and then spontaneously resolved, have not reoccurred until the last 5 days   Neuro exam findings: A x O x 3; no focal neurological deficits during my visit   CT head findings: no acute intracranial abnormality; few scattered foci of periventricular white matter low attenuation   Possible MS? vs complex partial seizure?      MRI brain w/wo contrast ordered   [facility] Neurology consulted by ED provider; per hand off, neuro order has recommended MRI w/wo contrast and EEG  o Order placed for MRI and EEG; pending   Official neuro eval pending

## 2021-07-14 ENCOUNTER — APPOINTMENT (OUTPATIENT)
Dept: INTERVENTIONAL RADIOLOGY/VASCULAR | Facility: HOSPITAL | Age: 36
DRG: 043 | End: 2021-07-14
Payer: COMMERCIAL

## 2021-07-14 ENCOUNTER — APPOINTMENT (OUTPATIENT)
Dept: MRI IMAGING | Facility: HOSPITAL | Age: 36
DRG: 043 | End: 2021-07-14
Payer: COMMERCIAL

## 2021-07-14 PROBLEM — G35 MS (MULTIPLE SCLEROSIS) (HCC): Status: ACTIVE | Noted: 2021-07-13

## 2021-07-14 LAB
APPEARANCE CSF: CLEAR
APTT PPP: 33 SECONDS (ref 23–37)
GLUCOSE CSF-MCNC: 62 MG/DL (ref 50–80)
GLUCOSE SERPL-MCNC: 101 MG/DL (ref 65–140)
GLUCOSE SERPL-MCNC: 109 MG/DL (ref 65–140)
GLUCOSE SERPL-MCNC: 113 MG/DL (ref 65–140)
GRAM STN SPEC: NORMAL
GRAM STN SPEC: NORMAL
LYMPHOCYTES NFR CSF MANUAL: 88 %
MONOS+MACROS CSF MANUAL: 12 %
PROT CSF-MCNC: 72 MG/DL (ref 15–45)
RBC # CSF MANUAL: 15 UL (ref 0–10)
TOTAL CELLS COUNTED BLD: NO
TOTAL CELLS COUNTED SPEC: 52
TUBE # CSF: 4
WBC # CSF AUTO: 28 /UL (ref 0–5)

## 2021-07-14 PROCEDURE — 87070 CULTURE OTHR SPECIMN AEROBIC: CPT | Performed by: PHYSICIAN ASSISTANT

## 2021-07-14 PROCEDURE — 82784 ASSAY IGA/IGD/IGG/IGM EACH: CPT | Performed by: PHYSICIAN ASSISTANT

## 2021-07-14 PROCEDURE — 82948 REAGENT STRIP/BLOOD GLUCOSE: CPT

## 2021-07-14 PROCEDURE — 83520 IMMUNOASSAY QUANT NOS NONAB: CPT | Performed by: PHYSICIAN ASSISTANT

## 2021-07-14 PROCEDURE — 62328 DX LMBR SPI PNXR W/FLUOR/CT: CPT | Performed by: RADIOLOGY

## 2021-07-14 PROCEDURE — 99222 1ST HOSP IP/OBS MODERATE 55: CPT | Performed by: PSYCHIATRY & NEUROLOGY

## 2021-07-14 PROCEDURE — 99232 SBSQ HOSP IP/OBS MODERATE 35: CPT | Performed by: STUDENT IN AN ORGANIZED HEALTH CARE EDUCATION/TRAINING PROGRAM

## 2021-07-14 PROCEDURE — 89050 BODY FLUID CELL COUNT: CPT | Performed by: PHYSICIAN ASSISTANT

## 2021-07-14 PROCEDURE — 83916 OLIGOCLONAL BANDS: CPT | Performed by: PHYSICIAN ASSISTANT

## 2021-07-14 PROCEDURE — 009U3ZX DRAINAGE OF SPINAL CANAL, PERCUTANEOUS APPROACH, DIAGNOSTIC: ICD-10-PCS | Performed by: RADIOLOGY

## 2021-07-14 PROCEDURE — 62270 DX LMBR SPI PNXR: CPT

## 2021-07-14 PROCEDURE — 72157 MRI CHEST SPINE W/O & W/DYE: CPT

## 2021-07-14 PROCEDURE — 82040 ASSAY OF SERUM ALBUMIN: CPT | Performed by: PHYSICIAN ASSISTANT

## 2021-07-14 PROCEDURE — G1004 CDSM NDSC: HCPCS

## 2021-07-14 PROCEDURE — 85730 THROMBOPLASTIN TIME PARTIAL: CPT | Performed by: PHYSICIAN ASSISTANT

## 2021-07-14 PROCEDURE — 84157 ASSAY OF PROTEIN OTHER: CPT | Performed by: PHYSICIAN ASSISTANT

## 2021-07-14 PROCEDURE — 82945 GLUCOSE OTHER FLUID: CPT | Performed by: PHYSICIAN ASSISTANT

## 2021-07-14 PROCEDURE — A9585 GADOBUTROL INJECTION: HCPCS | Performed by: PHYSICIAN ASSISTANT

## 2021-07-14 PROCEDURE — 82042 OTHER SOURCE ALBUMIN QUAN EA: CPT | Performed by: PHYSICIAN ASSISTANT

## 2021-07-14 PROCEDURE — 72156 MRI NECK SPINE W/O & W/DYE: CPT

## 2021-07-14 PROCEDURE — 83873 ASSAY OF CSF PROTEIN: CPT | Performed by: PHYSICIAN ASSISTANT

## 2021-07-14 PROCEDURE — 89051 BODY FLUID CELL COUNT: CPT | Performed by: PHYSICIAN ASSISTANT

## 2021-07-14 RX ORDER — LIDOCAINE WITH 8.4% SOD BICARB 0.9%(10ML)
SYRINGE (ML) INJECTION CODE/TRAUMA/SEDATION MEDICATION
Status: COMPLETED | OUTPATIENT
Start: 2021-07-14 | End: 2021-07-14

## 2021-07-14 RX ORDER — SODIUM CHLORIDE 9 MG/ML
100 INJECTION, SOLUTION INTRAVENOUS CONTINUOUS
Status: DISCONTINUED | OUTPATIENT
Start: 2021-07-14 | End: 2021-07-16

## 2021-07-14 RX ADMIN — ACETAMINOPHEN 650 MG: 325 TABLET, FILM COATED ORAL at 17:53

## 2021-07-14 RX ADMIN — SODIUM CHLORIDE 1000 MG: 0.9 INJECTION, SOLUTION INTRAVENOUS at 17:22

## 2021-07-14 RX ADMIN — GADOBUTROL 6 ML: 604.72 INJECTION INTRAVENOUS at 20:03

## 2021-07-14 RX ADMIN — SODIUM CHLORIDE 100 ML/HR: 0.9 INJECTION, SOLUTION INTRAVENOUS at 15:47

## 2021-07-14 RX ADMIN — Medication 3 ML: at 15:16

## 2021-07-14 NOTE — DISCHARGE INSTRUCTIONS
Lumbar Puncture     WHAT YOU NEED TO KNOW:   Lumbar puncture (LP) is a procedure in which a needle is inserted in your back and into your spinal canal  This is usually done to collect cerebrospinal fluid (CSF) to check for an infection, inflammation, bleeding, or other conditions that affect the brain  CSF is a clear, protective fluid that flows around the brain and inside the spinal canal  LP may also be done to remove CSF to reduce pressure in the brain  DISCHARGE INSTRUCTIONS:     Follow up with your healthcare provider as directed: Write down your questions so you remember to ask them during your visits  Post-lumbar puncture headache: You may develop a headache during the first few hours after your LP that may last for several days  The headache may be mild to severe and may get worse when you sit or stand  The following may help ease a post-lumbar puncture headache:  · Drink plenty of liquids: You should drink more liquid than usual after your LP  Ask how much liquid is right for you  Caffeine may be used to treat a headache  Drinks, such as coffee, tea, or some sodas, have caffeine  Ask a Do not drink alcohol  · Lie down: If you have a headache after your lumbar puncture, it may be helpful to lie down and rest   · You may have a slight soreness over the LP area  This is normal   · Remove the band aid or dressing in 24 hours  · Contact Interventional Radiology imediately  at [phone] [place] PATIENTS: Contact Interventional Radiology at [phone]) Shira Chahal PATIENTS: Contact Interventional Radiology at [phone]) if any of the following occur:  · You have a severe headache that does not get better after you lie down  · Persistent nausea or vomiting   · You have a fever  · You have a stiff neck or have trouble thinking clearly  · Your legs, feet, or other parts below the waist feel numb, tingly, or weak     · You have bleeding or a discharge coming from the area where the needle was put into your back  · You have severe pain in your back or neck

## 2021-07-14 NOTE — ASSESSMENT & PLAN NOTE
Bharathi Goyal is a 39 y o  female with migraines and anemia who presents to [place] 07/13/2021 with recurrent episodes of right sided numbness/tingling followed by stiffness  Scattered supratentorial ring enhancing lesions suggesting active demyelination as well as extensive white matter disease suggesting advanced demyelinating disease of MS on MRI brain  LP revealed elevated total protein of 72, WBC of 28 (Lymph predominant), and patient started on IV steroids  Cannot rule out seizures as the etiology of the events patient has been experiencing, routine EEG pending  Plan:  - Routine EEG pending   - S/p LP, CSF labs pending:   · MS panel, NMO IgG autoantibody   - Serum labs pending: MS panel   - Continue IV Solumedrol 1000 mg daily x 5 days, today is day 2/5  - Will hold off on AEDs at this time   - Medical management per primary team, notify with changes    Imaging/Labs:  - CT head 07/13/2021:  · No acute intracranial abnormalities  · Few scattered foci of periventricular white matter low attenuation, may be secondary to microangiopathic changes, migraines, post infection/post inflammatory, or demyelinating disease  - MRI brain 07/13/2021:  · Extensive white matter disease suggesting advanced demyelinating disease of multiple sclerosis  · Scattered supratentorial ring enhancing lesions suggest active demyelination  - MRI C-spine:  · Multifocal cervical spinal cord signal abnormalities most consistent with demyelinating disease  No active demyelination or cord atrophy noted  · Multilevel mild canal and foraminal narrowing more pronounced at level C4-5  - MRI T-spine:  · Patchy cord signal abnormality a level T8-9 most consistent with demyelinating disease    No active demyelination or cord atrophy noted  - S/p LP 07/14/2021:  · Total protein elevated 72  · WBC count elevated 28, lymphocyte predominant 88%  · RBC count elevated 15  · CSF results WNL: Glucose, Gram stain, culture

## 2021-07-14 NOTE — UTILIZATION REVIEW
OBSERVATION 7/13/21 @ 1206 CONVERTED TO INPATIENT 7/14/21 @1851 DUE TO LIKELY NEW DIAGNOSIS MS  REQUIRING   LP AND IV STEROIDS  Initial Clinical Review    Admission: Date/Time/Statement:   07/14/21 1851  Inpatient Admission Once     Transfer Service: General Medicine       Question Answer Comment   Level of Care Med Surg    Estimated length of stay More than 2 Midnights    Certification I certify that inpatient services are medically necessary for this patient for a duration of greater than two midnights  See H&P and MD Progress Notes for additional information about the patient's course of treatment  07/14/21 1851         ED Arrival Information     Expected Arrival Acuity    - 7/13/2021 08:23 Urgent         Means of arrival Escorted by Service Admission type    [address] [facility] General Medicine Urgent         Arrival complaint    ARM PAIN        Chief Complaint   Patient presents with    Numbness     Patient c/o right arm and leg numbness for the last 4 days  Patient states 3 episodes yesterday and 2 the day before  Patient states symptoms come and go  Patient states she experienced similar symptoms in 2018  Initial Presentation: 39year old female to the ED from home with complaints of right arm and leg numbness for 4 days prior to arrival, diarrhea  Admitted under observation for parasthesia and pain right upper and lower extremity  Has had similar episodes of numbness but now are increasing in frequency  They spontaneously resolve  GCS 15   CT head shows: no acute intracranial abnormality; few scattered foci of periventricular white matter low attenuation Check MRI brain  Neuro consult  Check EEG  Date:   7/14 Update: MRI: Extensive white matter disease suggesting advanced demyelinating disease of multiple sclerosis   Scattered supratentorial ring-enhancing lesions suggest active demyelination  Neuro consult pending  Check LP  Started on IV steroids     7/14 Neurology consult:  Reviewed MRI which shows multiple chronic lesions and new enhancing of bihemispheric lesions  Started on IV steroids  Lower suspicion for seizure activity, but did describe  An episode of stiffening of hands in a spasm like manner  LP planned  EEG pending  ED Triage Vitals   Temperature Pulse Respirations Blood Pressure SpO2   07/13/21 0835 07/13/21 0835 07/13/21 0835 07/13/21 0835 07/13/21 0835   98 7 °F (37 1 °C) 72 18 126/68 100 %      Temp Source Heart Rate Source Patient Position - Orthostatic VS BP Location FiO2 (%)   07/13/21 0835 07/13/21 0835 07/13/21 0835 07/13/21 0835 --   Oral Monitor Sitting Left arm       Pain Score       07/13/21 1300       No Pain          Wt Readings from Last 1 Encounters:   07/13/21 62 1 kg (137 lb)     Additional Vital Signs:   Date/Time  Temp  Pulse  Resp  BP  MAP (mmHg)  SpO2  O2 Device Patient Position - Orthostatic VS   07/14/21 07:19:37  98 5 °F (36 9 °C)  69  16  119/69  86  97 %  -- --   07/13/21 23:32:04  99 6 °F (37 6 °C)  69  --  115/48Abnormal   70  99 %  -- --   07/13/21 1941  --  --  --  --  --  98 %  None (Room air) --   07/13/21 18:47:27  98 7 °F (37 1 °C)  78  20  116/68  84  98 %  -- --   07/13/21 0835  98 7 °F (37 1 °C)  72  18  126/68  --  100 %  None (Room air) Sitting     Pertinent Labs/Diagnostic Test Results:   7/14 MRI tspine: Patchy cord signal abnormality at level T8-9 most consistent with demyelinating disease given findings in brain MRI   No active demyelination   No cord atrophy   7/15 MRI Cspine: Multifocal cervical spinal cord signal abnormality most consistent with demyelinating disease given findings in brain MRI   No active demyelination   No cord atrophy  Multilevel mild canal and foraminal narrowing more pronounced at level C4-5    7/14 MRI:MRI: Extensive white matter disease suggesting advanced demyelinating disease of multiple sclerosis   Scattered supratentorial ring-enhancing lesions suggest active demyelination    7/13 CT Head: No acute intracranial abnormality  Few scattered foci of periventricular white matter low attenuation   In this age group, this is nonspecific, and may be secondary to microangiopathic change, migraines, a post infectious/post inflammatory in etiology, or demyelinating disease, among   other causes  [place] Barrier the clinical history, a demyelinating disease is of some concern   Recommend neurology evaluation an MRI of the brain when clinically appropriate           Results from last 7 days   Lab Units 07/13/21  1015   WBC Thousand/uL 6 81   HEMOGLOBIN g/dL 12 3   HEMATOCRIT % 40 4   PLATELETS Thousands/uL 262   NEUTROS ABS Thousands/µL 4 60         Results from last 7 days   Lab Units 07/13/21  1015   SODIUM mmol/L 140   POTASSIUM mmol/L 3 8   CHLORIDE mmol/L 105   CO2 mmol/L 28   ANION GAP mmol/L 7   BUN mg/dL 11   CREATININE mg/dL 0 80   EGFR ml/min/1 73sq m 109   CALCIUM mg/dL 8 7     Results from last 7 days   Lab Units 07/13/21  1015   AST U/L 13   ALT U/L 17   ALK PHOS U/L 50   TOTAL PROTEIN g/dL 7 9   ALBUMIN g/dL 3 6   TOTAL BILIRUBIN mg/dL 0 37     Results from last 7 days   Lab Units 07/14/21  0721 07/13/21  2100   POC GLUCOSE mg/dl 101 87     Results from last 7 days   Lab Units 07/13/21  1015   GLUCOSE RANDOM mg/dL 96     ED Treatment:   Medication Administration from 07/13/2021 0823 to 07/13/2021 1844       Date/Time Order Dose Route Action     07/13/2021 1407 enoxaparin (LOVENOX) subcutaneous injection 40 mg 40 mg Subcutaneous Given          Admitting Diagnosis: Numbness [R20 0]  Weakness [R53 1]  Abnormal CT of the head [R93 0]  Age/Sex: 39 y o  female  Admission Orders:  MRI  Up with assist  EEG  Scheduled Medications:  enoxaparin, 40 mg, Subcutaneous, Q24H [address]      Continuous IV Infusions:     PRN Meds:  acetaminophen, 650 mg, Oral, Q6H PRN  lidocaine, 1 patch, Topical, Daily PRN        IP CONSULT TO NEUROLOGY    Network Utilization Review Department  ATTENTION: Please call with any questions or concerns to [phone] and carefully listen to the prompts so that you are directed to the right person  All voicemails are confidential   Airam Araujo all requests for admission clinical reviews, approved or denied determinations and any other requests to dedicated fax number below belonging to the campus where the patient is receiving treatment   List of dedicated fax numbers for the Facilities:  [address] DENIALS (Administrative/Medical Necessity) [phone]   [address] (Maternity/NICU/Pediatrics) [phone]   [address] [place] [address] [id number] [place] [address] [address] [facility] [place] [address] [phone]

## 2021-07-14 NOTE — ASSESSMENT & PLAN NOTE
· Multiple sclerosis; diagnosed during this admission  · Presented with right-sided upper and lower extremity paresthesias and weakness   MRI: Extensive white matter disease suggesting advanced demyelinating disease of multiple sclerosis  Scattered supratentorial ring-enhancing lesions suggest active demyelination  · MRI of cervical and thoracic spine:  Multifocal cervical spinal cord signal abnormality, consistent with demyelinating disease  Patchy core signal abnormality at level T8-T9, consistent with demyelinating disease  No active demyelination  · LP performed - CSF contains elevated WBC, RBC, and protein  · EEG: recorded during an episode, no changes seen  Does not fully exclude the possibility of a focal aware seizure (simple partial seizure)    Possibly psychogenic seizure  · CSF culture and Gram stain negative  · Antibody for neuromyelitis optica negative  · Neurology following     Serum MS panel pending   Continue IV methylprednisolone pulse treatment - continue for total of 5 days; course will end 7/18   F/U outpatient with MS attending in 4 weeks

## 2021-07-14 NOTE — PHYSICIAN ADVISOR
Current patient class: Observation  The patient is currently on Hospital Day: 2 at [address]      This patient was originally admitted to the hospital under observation status  After admission the patient was reevaluated and determined to require further hospitalization  The patient is now documented to require at least a 2nd midnight in the hospital  As such the patient is now expected to satisfy the 2 midnight benchmark and is therefore eligible for inpatient admission  After review of the relevant documentation, labs, vital signs and test results, the patient is appropriate for INPATIENT ADMISSION  Admission to the hospital as an inpatient is a complex decision making process which requires the practitioner to consider the patients presenting complaint, history and physical examination and all relevant testing  With this in mind, in this case, the patient was deemed appropriate for INPATIENT ADMISSION  After review of the documentation and testing available at the time of the admission I concur with this clinical determination of medical necessity  Rationale is as follows: The patient is a 39 yrs Female who presented to the ED at 7/13/2021  9:41 AM with a chief complaint of Numbness (Patient c/o right arm and leg numbness for the last 4 days  Patient states 3 episodes yesterday and 2 the day before  Patient states symptoms come and go  Patient states she experienced similar symptoms in 2018  )  Symptoms have been increasing in frequency and are worse now than they were a couple years ago  Symptoms seem to affect the right side more than the left  She did have a CT scan done showing white matter changes  This was followed by an MRI which was significantly abnormal signifying extensive demyelinating disease in concerns for multiple sclerosis  Because of this, neurology consult was obtained and patient is now receiving IV steroids    Some of her other symptoms may also be related to seizure disorder and further evaluation has been suggested  Because patient has new symptoms, the need for intravenous medication and also the need for further evaluation in patient status is most appropriate  The patients vitals on arrival were   ED Triage Vitals   Temperature Pulse Respirations Blood Pressure SpO2   21 0835 21 0835 21 0835 21 0835 21 0835   98 7 °F (37 1 °C) 72 18 126/68 100 %      Temp Source Heart Rate Source Patient Position - Orthostatic VS BP Location FiO2 (%)   21 0835 21 0835 21 0835 21 0835 --   Oral Monitor Sitting Left arm       Pain Score       21 1300       No Pain           History reviewed  No pertinent past medical history    Past Surgical History:   Procedure Laterality Date     SECTION      IR LUMBAR PUNCTURE  2021       The patient was admitted to the hospital at N/A on N/A for the following diagnosis:  Numbness [R20 0]  Weakness [R53 1]  Abnormal CT of the head [R93 0]    Consults have been placed to:   IP CONSULT TO NEUROLOGY    Vitals:    21 0900 21 1500 21 1555 21 1753   BP:  125/60 118/67    BP Location:       Pulse:  69 59    Resp:  16     Temp:  98 5 °F (36 9 °C)  97 9 °F (36 6 °C)   TempSrc:    Oral   SpO2: 98% 97% 100%    Weight:       Height:           Most recent labs:    Recent Labs     21  1015   WBC 6 81   HGB 12 3   HCT 40 4      K 3 8   CALCIUM 8 7   BUN 11   CREATININE 0 80   AST 13   ALT 17   ALKPHOS 50       Scheduled Meds:  Current Facility-Administered Medications   Medication Dose Route Frequency Provider Last Rate    acetaminophen  650 mg Oral Q6H PRN Jessenia Daly MD      lidocaine  1 patch Topical Daily PRN Jessenia Daly MD      methylPREDNISolone sodium succinate  1,000 mg Intravenous Daily Lorie Rodas PA-C 1,000 mg (21 1722)    sodium chloride  100 mL/hr Intravenous Continuous Rigoberto Washington MD 100 mL/hr (07/14/21 1547)     Continuous Infusions:sodium chloride, 100 mL/hr, Last Rate: 100 mL/hr (07/14/21 1547)      PRN Meds:   acetaminophen    lidocaine    Surgical procedures (if appropriate):

## 2021-07-14 NOTE — PROGRESS NOTES
* MS (multiple sclerosis) ([facility] )  Assessment & Plan  · Multiple sclerosis; diagnosed during this admission  · Presented with complaint of intermittent right-sided paresthesias X 5 days; no visual disturbances; no bowel or bladder issues   CT head findings: no acute intracranial abnormality; few scattered foci of periventricular white matter low attenuation   MRI: Extensive white matter disease suggesting advanced demyelinating disease of multiple sclerosis  Scattered supratentorial ring-enhancing lesions suggest active demyelination  Calebkimber Cardenaso Neurology consulted     Lumbar puncture ordered; follow-up CSF results   Follow-up MRI of cervical and thoracic spine   F/U with MS attending in 4 weeks as per neuro recommendation            Patellofemoral syndrome, right  Assessment & Plan  · Known  · Stable    VTE Pharmacologic Prophylaxis:   Pharmacologic: Enoxaparin (Lovenox)  Mechanical VTE Prophylaxis in Place: Yes    Discussions with Specialists or Other Care Team Provider: Care management team    Education and Discussions with Family / Patient: spoke with patient and  at bedside    Current Length of Stay: 0 day(s)    Current Patient Status: Observation     Discharge Plan / Estimated Discharge Date: pending    Code Status: Level 1 - Full Code      Subjective:   Patient reported an additional episode this morning lasting 30 seconds with her  present  She reports these episodes have never woken her from sleep  She denies any aura or repetitive movements associated with the episodes  She denies any fever, chills, abdominal pain, diarrhea, constipation  Objective:     Vitals:   Temp (24hrs), Av 9 °F (37 2 °C), Min:98 5 °F (36 9 °C), Max:99 6 °F (37 6 °C)    Temp:  [98 5 °F (36 9 °C)-99 6 °F (37 6 °C)] 98 5 °F (36 9 °C)  HR:  [69-78] 69  Resp:  [16-20] 16  BP: (115-119)/(48-69) 119/69  SpO2:  [97 %-99 %] 97 %  Body mass index is 25 89 kg/m²       Input and Output Summary (last 24 hours): Intake/Output Summary (Last 24 hours) at 7/14/2021 1452  Last data filed at 7/14/2021 0900  Gross per 24 hour   Intake 24 ml   Output --   Net 24 ml       Physical Exam:     Physical Exam  Constitutional:       General: She is not in acute distress  Appearance: Normal appearance  She is not ill-appearing  HENT:      Head: Normocephalic  Eyes:      Extraocular Movements: Extraocular movements intact  Cardiovascular:      Rate and Rhythm: Normal rate and regular rhythm  Heart sounds: Normal heart sounds  Pulmonary:      Effort: Pulmonary effort is normal       Breath sounds: Normal breath sounds  No wheezing, rhonchi or rales  Neurological:      Mental Status: She is alert and oriented to person, place, and time  Sensory: No sensory deficit  Motor: No weakness  Deep Tendon Reflexes: Reflexes normal    Psychiatric:         Mood and Affect: Mood normal          Behavior: Behavior normal          Additional Data:     Labs:    Results from last 7 days   Lab Units 07/13/21  1015   WBC Thousand/uL 6 81   HEMOGLOBIN g/dL 12 3   HEMATOCRIT % 40 4   PLATELETS Thousands/uL 262   NEUTROS PCT % 68   LYMPHS PCT % 25   MONOS PCT % 6   EOS PCT % 1     Results from last 7 days   Lab Units 07/13/21  1015   POTASSIUM mmol/L 3 8   CHLORIDE mmol/L 105   CO2 mmol/L 28   BUN mg/dL 11   CREATININE mg/dL 0 80   CALCIUM mg/dL 8 7   ALK PHOS U/L 50   ALT U/L 17   AST U/L 13           * I Have Reviewed All Lab Data Listed Above  * Additional Pertinent Lab Tests Reviewed:  [facility] Admission Reviewed    Imaging:    Imaging Reports Reviewed Today Include: MRI  Imaging Personally Reviewed by Myself Includes:  MRI    Recent Cultures (last 7 days):           Last 24 Hours Medication List:   Current Facility-Administered Medications   Medication Dose Route Frequency Provider Last Rate    acetaminophen  650 mg Oral Q6H PRN Mehul Laureano MD      lidocaine  1 patch Topical Daily CATRACHITA Calle MD      sodium chloride  100 mL/hr Intravenous Continuous Rayray Willoughby MD          Today, Patient Was Seen By: Alyssa Calle MD    ** Please Note: This note has been constructed using a voice recognition system   **

## 2021-07-14 NOTE — PROGRESS NOTES
Patient returned in bed from IR post Lumbar puncture  Lying flat for 2 hour bed rest   IV fluids and P O fluids started   Vital signs stable  118/67-59-18- 97 9

## 2021-07-14 NOTE — CONSULTS
Consultation - Neurology   Ernestine Moore 39 y o  female MRN: [id number]  Unit/Bed#: -01 Encounter: [id number]      Assessment/Plan   * MS (multiple sclerosis) [facility])  Assessment & Plan  Ernestine Moore is a 39 y o  female with migraines and anemia who presents to [facility] 07/13/2021 with recurrent episodes of right sided numbness/tingling followed by stiffness  Scattered supratentorial ring enhancing lesions suggesting active demyelination as well as extensive white matter disease suggesting advanced demyelinating disease of MS on MRI brain  LP revealed elevated total protein of 72 and patient started on IV steroids  Cannot rule out seizures as the etiology of the events patient has been experiencing  Plan:  - Routine EEG pending   - MRI C-spine w wo pending  - MRI T spine w wo pending   - S/p LP, CSF labs pending:   · WBC, RBC, gram stain, culture, MS panel, NMO IgG autoantibody   - Serum labs pending: MS panel   - Started on IV Solumedrol 1000 mg daily x 5 days  - Will hold off on AEDs at this time   - Medical management per primary team, notify with changes    Imaging/Labs:  - CT head 07/13/2021:  · No acute intracranial abnormalities  · Few scattered foci of periventricular white matter low attenuation, may be secondary to microangiopathic changes, migraines, post infection/post inflammatory, or demyelinating disease  - MRI brain 07/13/2021:  · Extensive white matter disease suggesting advanced demyelinating disease of multiple sclerosis  · Scattered supratentorial ring enhancing lesions suggest active demyelination  - S/p LP 07/14/2021:  · Total protein elevated 72  · CSF results WNL: Glucose     Ernestine Moore will need follow up in in 4 weeks with multiple sclerosis attending  She will not require outpatient neurological testing      History of Present Illness     Reason for Consult / Principal Problem: Weakness, abnormal CT head     HPI: Ernestine Moore is a 39 y o   female with migraines and anemia who presents to [facility] 07/13/2021 with recurrent episodes of right sided numbness/tingling followed by stiffness  Patient reports for the past 5 days she has been experiencing episodes of numbness/tingling originating in right knee or right finger, radiating proximally and resulting in stiffness and weakness in right upper and lower extremity, primarily upper extremity  Patient reports frequency of episodes have been increasing since this started 5 days ago  Patient reports the numbness/tingling and stiffness lasts 30 seconds-1 minutes and resolved on its own  Patient states she feels perfectly fine after the episode resolves, denying any confusion or residual weakness  Patient reports she had similar symptoms 3 years ago which she never sought medical attention for  She states at that time, her symptoms have occurred every day for 3 months  Patient denies any associated headache, shaking of extremities, word-finding difficulties, visual changes, confusion following the episode  Patient denies history of seizures, family history of seizures, history or family history of MS  Patient reports she does not have any significant past medical history aside from anemia  She does admit to a migraine history, last migraine occurring approximately 1 year ago  Patient describes her migraine as pressure inside her eyes with bifrontal throbbing  She states her migraine is not associated with photophobia, phonophobia, or nausea/vomiting  Patient reports her migraine typically lasts a couple of hours, and resolved on its own without medications  Today patient states she feels good today  She did experience an episode this morning at approximately 8 AM  Denies CP, SOB, headache, dizziness, vision changes, N/V, abdominal pain, weakness or numbness      Inpatient consult to Neurology  Consult performed by: Mp Rizo PA-C  Consult ordered by: Argenis Escobar MD        Review of Systems  12 point ROS performed, as stated above, all others negative  Historical Information   History reviewed  No pertinent past medical history  Past Surgical History:   Procedure Laterality Date     SECTION      IR LUMBAR PUNCTURE  2021     Social History   Social History     Substance and Sexual Activity   Alcohol Use Never     Social History     Substance and Sexual Activity   Drug Use Never     E-Cigarette/Vaping     E-Cigarette/Vaping Substances     Social History     Tobacco Use   Smoking Status Never Smoker   Smokeless Tobacco Never Used     Family History: History reviewed  No pertinent family history  Review of previous medical records was completed  Meds/Allergies   all current active meds have been reviewed, current meds:   Current Facility-Administered Medications   Medication Dose Route Frequency    acetaminophen (TYLENOL) tablet 650 mg  650 mg Oral Q6H PRN    lidocaine (LIDODERM) 5 % patch 1 patch  1 patch Topical Daily PRN    methylPREDNISolone sodium succinate (Solu-MEDROL) 1,000 mg in sodium chloride 0 9 % 250 mL IVPB  1,000 mg Intravenous Daily    sodium chloride 0 9 % infusion  100 mL/hr Intravenous Continuous   , PTA meds:   Prior to Admission Medications   Prescriptions Last Dose Informant Patient Reported? Taking?   meloxicam (MOBIC) 7 5 mg tablet   No No   Sig: Take 1 tablet (7 5 mg total) by mouth daily      Facility-Administered Medications: None    and     No Known Allergies    Objective   Vitals:Blood pressure 118/67, pulse 59, temperature 98 5 °F (36 9 °C), resp  rate 16, height 5' 1" (1 549 m), weight 62 1 kg (137 lb), SpO2 100 %  ,Body mass index is 25 89 kg/m²  Intake/Output Summary (Last 24 hours) at 2021 1716  Last data filed at 2021 1200  Gross per 24 hour   Intake 624 ml   Output --   Net 624 ml       Invasive Devices:    Invasive Devices     Peripheral Intravenous Line            Peripheral IV 21 Right Antecubital 1 day              Physical Exam  Vitals and nursing note reviewed  Constitutional:       General: She is not in acute distress  Appearance: Normal appearance  She is normal weight  She is not ill-appearing, toxic-appearing or diaphoretic  HENT:      Head: Normocephalic and atraumatic  Eyes:      General: No scleral icterus  Right eye: No discharge  Left eye: No discharge  Extraocular Movements: Extraocular movements intact and EOM normal       Conjunctiva/sclera: Conjunctivae normal    Musculoskeletal:         General: Normal range of motion  Cervical back: Normal range of motion and neck supple  Skin:     General: Skin is warm and dry  Coloration: Skin is not jaundiced or pale  Findings: No bruising, erythema, lesion or rash  Neurological:      Mental Status: She is alert  Coordination: Finger-Nose-Finger Test normal    Psychiatric:         Mood and Affect: Mood normal          Behavior: Behavior normal          Thought Content: Thought content normal          Judgment: Judgment normal        Neurologic Exam     Mental Status   Patient is alert, sitting up in bed eating breakfast, accompanied by   Oriented x 3  No dysarthria or aphasia noted  Able to name all objects provided, follows multistep commands, and answers all questions appropriately  Cranial Nerves     CN II   Visual fields full to confrontation  CN III, IV, VI   Extraocular motions are normal    Nystagmus: none   Upgaze: normal  Downgaze: normal  Conjugate gaze: present    CN V   Facial sensation intact  CN VII   Facial expression full, symmetric       CN VIII   Hearing: intact    CN XI   CN XI normal      CN XII   Tongue deviation: none  No tongue lacerations noted     Motor Exam   Muscle bulk: normal  Overall muscle tone: normal  Right arm pronator drift: absent  Left arm pronator drift: absent   strength symmetric   Bilateral upper and lower extremity strength testing 5/5 throughout        Sensory Exam   Light touch normal

## 2021-07-15 ENCOUNTER — APPOINTMENT (OUTPATIENT)
Dept: NEUROLOGY | Facility: HOSPITAL | Age: 36
DRG: 043 | End: 2021-07-15
Payer: COMMERCIAL

## 2021-07-15 LAB
ANION GAP SERPL CALCULATED.3IONS-SCNC: 13 MMOL/L (ref 4–13)
ATRIAL RATE: 64 BPM
BASOPHILS # BLD AUTO: 0.01 THOUSANDS/ΜL (ref 0–0.1)
BASOPHILS NFR BLD AUTO: 0 % (ref 0–1)
BUN SERPL-MCNC: 12 MG/DL (ref 5–25)
CALCIUM SERPL-MCNC: 8.7 MG/DL (ref 8.3–10.1)
CHLORIDE SERPL-SCNC: 105 MMOL/L (ref 100–108)
CO2 SERPL-SCNC: 22 MMOL/L (ref 21–32)
CREAT SERPL-MCNC: 0.77 MG/DL (ref 0.6–1.3)
EOSINOPHIL # BLD AUTO: 0 THOUSAND/ΜL (ref 0–0.61)
EOSINOPHIL NFR BLD AUTO: 0 % (ref 0–6)
ERYTHROCYTE [DISTWIDTH] IN BLOOD BY AUTOMATED COUNT: 15.7 % (ref 11.6–15.1)
GFR SERPL CREATININE-BSD FRML MDRD: 114 ML/MIN/1.73SQ M
GLUCOSE SERPL-MCNC: 141 MG/DL (ref 65–140)
GLUCOSE SERPL-MCNC: 147 MG/DL (ref 65–140)
GLUCOSE SERPL-MCNC: 151 MG/DL (ref 65–140)
GLUCOSE SERPL-MCNC: 155 MG/DL (ref 65–140)
GLUCOSE SERPL-MCNC: 186 MG/DL (ref 65–140)
HCT VFR BLD AUTO: 39.7 % (ref 34.8–46.1)
HGB BLD-MCNC: 12.5 G/DL (ref 11.5–15.4)
IMM GRANULOCYTES # BLD AUTO: 0.09 THOUSAND/UL (ref 0–0.2)
IMM GRANULOCYTES NFR BLD AUTO: 1 % (ref 0–2)
INR PPP: 1.03 (ref 0.84–1.19)
LYMPHOCYTES # BLD AUTO: 0.84 THOUSANDS/ΜL (ref 0.6–4.47)
LYMPHOCYTES NFR BLD AUTO: 6 % (ref 14–44)
MCH RBC QN AUTO: 22.5 PG (ref 26.8–34.3)
MCHC RBC AUTO-ENTMCNC: 31.5 G/DL (ref 31.4–37.4)
MCV RBC AUTO: 72 FL (ref 82–98)
MONOCYTES # BLD AUTO: 0.02 THOUSAND/ΜL (ref 0.17–1.22)
MONOCYTES NFR BLD AUTO: 0 % (ref 4–12)
NEUTROPHILS # BLD AUTO: 13.02 THOUSANDS/ΜL (ref 1.85–7.62)
NEUTS SEG NFR BLD AUTO: 93 % (ref 43–75)
NRBC BLD AUTO-RTO: 0 /100 WBCS
P AXIS: 59 DEGREES
PLATELET # BLD AUTO: 281 THOUSANDS/UL (ref 149–390)
PMV BLD AUTO: 10.5 FL (ref 8.9–12.7)
POTASSIUM SERPL-SCNC: 4.1 MMOL/L (ref 3.5–5.3)
PR INTERVAL: 182 MS
PROTHROMBIN TIME: 13 SECONDS (ref 11.6–14.5)
QRS AXIS: 45 DEGREES
QRSD INTERVAL: 76 MS
QT INTERVAL: 380 MS
QTC INTERVAL: 392 MS
RBC # BLD AUTO: 5.55 MILLION/UL (ref 3.81–5.12)
SODIUM SERPL-SCNC: 140 MMOL/L (ref 136–145)
T WAVE AXIS: 24 DEGREES
VENTRICULAR RATE: 64 BPM
WBC # BLD AUTO: 13.98 THOUSAND/UL (ref 4.31–10.16)

## 2021-07-15 PROCEDURE — 85025 COMPLETE CBC W/AUTO DIFF WBC: CPT | Performed by: STUDENT IN AN ORGANIZED HEALTH CARE EDUCATION/TRAINING PROGRAM

## 2021-07-15 PROCEDURE — 82948 REAGENT STRIP/BLOOD GLUCOSE: CPT

## 2021-07-15 PROCEDURE — 93010 ELECTROCARDIOGRAM REPORT: CPT | Performed by: INTERNAL MEDICINE

## 2021-07-15 PROCEDURE — 82306 VITAMIN D 25 HYDROXY: CPT | Performed by: PSYCHIATRY & NEUROLOGY

## 2021-07-15 PROCEDURE — 95819 EEG AWAKE AND ASLEEP: CPT | Performed by: PSYCHIATRY & NEUROLOGY

## 2021-07-15 PROCEDURE — 80048 BASIC METABOLIC PNL TOTAL CA: CPT | Performed by: STUDENT IN AN ORGANIZED HEALTH CARE EDUCATION/TRAINING PROGRAM

## 2021-07-15 PROCEDURE — 99232 SBSQ HOSP IP/OBS MODERATE 35: CPT | Performed by: STUDENT IN AN ORGANIZED HEALTH CARE EDUCATION/TRAINING PROGRAM

## 2021-07-15 PROCEDURE — 99232 SBSQ HOSP IP/OBS MODERATE 35: CPT | Performed by: PSYCHIATRY & NEUROLOGY

## 2021-07-15 PROCEDURE — 95816 EEG AWAKE AND DROWSY: CPT

## 2021-07-15 PROCEDURE — 85610 PROTHROMBIN TIME: CPT | Performed by: PHYSICIAN ASSISTANT

## 2021-07-15 RX ADMIN — SODIUM CHLORIDE 100 ML/HR: 0.9 INJECTION, SOLUTION INTRAVENOUS at 22:59

## 2021-07-15 RX ADMIN — SODIUM CHLORIDE 1000 MG: 0.9 INJECTION, SOLUTION INTRAVENOUS at 09:32

## 2021-07-15 RX ADMIN — SODIUM CHLORIDE 100 ML/HR: 0.9 INJECTION, SOLUTION INTRAVENOUS at 06:24

## 2021-07-15 NOTE — UTILIZATION REVIEW
Inpatient Admission Authorization Request   NOTIFICATION OF INPATIENT ADMISSION/INPATIENT AUTHORIZATION REQUEST   SERVICING FACILITY:   [address] [place] [address]  Tax ID: [id number]  NPI: [id number]  Place of Service: [facility]patient [facility]  [address]  Place of Service Code: 24     ATTENDING PROVIDER:  Attending Name and NPI#: Liz Webb Md [[id number]]  Address: [address] [place] [address]  Phone: [phone]     UTILIZATION REVIEW CONTACT:  Marcella Ventura Utilization   Network Utilization Review Department  Phone: [phone]  Fax [phone]  Email: [email or web address]     PHYSICIAN ADVISORY SERVICES:  FOR [id number] REVIEW - MEDICAL NECESSITY DENIAL  Phone: [phone]  Fax: [phone]  Email: [email or web address]     TYPE OF REQUEST:  Inpatient Status     ADMISSION INFORMATION:  ADMISSION DATE/TIME: 7/14/21  6:51 PM  PATIENT DIAGNOSIS CODE/DESCRIPTION:  Numbness [R20 0]  Weakness [R53 1]  Abnormal CT of the head [R93 0]  DISCHARGE DATE/TIME: No discharge date for patient encounter  DISCHARGE DISPOSITION (IF DISCHARGED): Home/Self Care     IMPORTANT INFORMATION:  Please contact the Marcella Ventura directly with any questions or concerns regarding this request  Department voicemails are confidential     Send requests for admission clinical reviews, concurrent reviews, approvals, and administrative denials due to lack of clinical to fax [phone]

## 2021-07-15 NOTE — PROGRESS NOTES
Progress Note - Neurology   Drea Zelaya 39 y o  female MRN: [id number]  Unit/Bed#: -01 Encounter: [id number]      Assessment/Plan   * MS (multiple sclerosis) [facility])  Assessment & Plan  Drea Zelaya is a 39 y o  female with migraines and anemia who presents to [facility] 07/13/2021 with recurrent episodes of right sided numbness/tingling followed by stiffness  Scattered supratentorial ring enhancing lesions suggesting active demyelination as well as extensive white matter disease suggesting advanced demyelinating disease of MS on MRI brain  LP revealed elevated total protein of 72, WBC of 28 (Lymph predominant), and patient started on IV steroids  Cannot rule out seizures as the etiology of the events patient has been experiencing, routine EEG pending  Plan:  - Routine EEG pending   - S/p LP, CSF labs pending:   · MS panel, NMO IgG autoantibody   - Serum labs pending: MS panel   - Continue IV Solumedrol 1000 mg daily x 5 days, today is day 2/5  - Will hold off on AEDs at this time   - Medical management per primary team, notify with changes    Imaging/Labs:  - CT head 07/13/2021:  · No acute intracranial abnormalities  · Few scattered foci of periventricular white matter low attenuation, may be secondary to microangiopathic changes, migraines, post infection/post inflammatory, or demyelinating disease  - MRI brain 07/13/2021:  · Extensive white matter disease suggesting advanced demyelinating disease of multiple sclerosis  · Scattered supratentorial ring enhancing lesions suggest active demyelination  - MRI C-spine:  · Multifocal cervical spinal cord signal abnormalities most consistent with demyelinating disease  No active demyelination or cord atrophy noted  · Multilevel mild canal and foraminal narrowing more pronounced at level C4-5  - MRI T-spine:  · Patchy cord signal abnormality a level T8-9 most consistent with demyelinating disease    No active demyelination or cord atrophy noted  - S/p LP 07/14/2021:  · Total protein elevated 72  · WBC count elevated 28, lymphocyte predominant 88%  · RBC count elevated 15  · CSF results WNL: Glucose, Gram stain, culture     Yissel Rose will need follow up in in 4 weeks with multiple sclerosis attending  She will not require outpatient neurological testing  Subjective: Today patient reports she had several episodes today, majority of the episodes lasting under 30 seconds which is an improvement for the patient  She states she had a headache when she was having the EEG completed and also had an episode that was captured on EEG  Today patient denies CP, SOB, headache, dizziness, vision changes, N/V, abdominal pain, weakness or numbness  ROS:  12 point ROS performed, as stated above, all others negative  Vitals: Blood pressure 127/95, pulse (!) 108, temperature 98 4 °F (36 9 °C), resp  rate 20, height 5' 1" (1 549 m), weight 62 1 kg (137 lb), SpO2 92 %  ,Body mass index is 25 89 kg/m²  Physical Exam  Vitals and nursing note reviewed  Constitutional:       General: She is not in acute distress  Appearance: Normal appearance  She is normal weight  She is not ill-appearing, toxic-appearing or diaphoretic  HENT:      Head: Normocephalic and atraumatic  Eyes:      General: No scleral icterus  Right eye: No discharge  Left eye: No discharge  Extraocular Movements: Extraocular movements intact and EOM normal       Conjunctiva/sclera: Conjunctivae normal    Musculoskeletal:         General: Normal range of motion  Cervical back: Normal range of motion and neck supple  Skin:     General: Skin is warm and dry  Neurological:      Mental Status: She is alert  Psychiatric:         Mood and Affect: Mood normal          Behavior: Behavior normal          Thought Content: Thought content normal          Judgment: Judgment normal        Neurologic Exam     Mental Status   Patient is alert, sitting up in bed, accompanied by daughter  Oriented x3  No dysarthria or aphasia noted  Able to follow central and appendicular commands, answers all questions appropriately  Cranial Nerves     CN II   Visual fields full to confrontation  CN III, IV, VI   Extraocular motions are normal    Upgaze: normal  Downgaze: normal  Conjugate gaze: present    CN V   Facial sensation intact  CN VII   Facial expression full, symmetric  CN VIII   Hearing: intact    CN XI   CN XI normal      CN XII   Tongue deviation: none  5-6 beat nystagmus noted to the left     Motor Exam   Muscle bulk: normal  Overall muscle tone: normal  Right arm pronator drift: absent  Left arm pronator drift: absent   strength symmetric 5/5  Bilateral upper and lower extremity strength testing 5/5 throughout      Sensory Exam   Light touch normal      Gait, Coordination, and Reflexes     Tremor   Resting tremor: absent  Episodes of RUE and RLE stiffening with minimal movement to command  No jerking movements or confusion following the event  Patient answering questions during event  Lasting approximately 65 seconds, resolved on its own     Lab Results: I have personally reviewed pertinent reports    Recent Results (from the past 24 hour(s))   CSF white cell count with differential    Collection Time: 07/14/21  3:24 PM   Result Value Ref Range    Appearance, CSF clear     Tube Number, CSF 4     WBC, CSF 28 (HH) 0 - 5 /uL    Xanthochromia No No   CSF Diff    Collection Time: 07/14/21  3:24 PM   Result Value Ref Range    Total Counted 52     Lymphs % CSF 88 %    Monocytes % (CSF) 12 %   Glucose, CSF    Collection Time: 07/14/21  3:25 PM   Result Value Ref Range    Glucose, CSF 62 50 - 80 mg/dL   Total Protein, CSF    Collection Time: 07/14/21  3:25 PM   Result Value Ref Range    Protein, CSF 72 (H) 15 - 45 mg/dL   RBC count,CSF    Collection Time: 07/14/21  3:25 PM   Result Value Ref Range    RBC, CSF 15 (H) 0 - 10 uL   STAT Gram Stain    Collection Time: 07/14/21  3:25 PM Specimen: Lumbar Puncture; Cerebrospinal Fluid   Result Value Ref Range    Gram Stain Result No bacteria seen     Gram Stain Result 1+ Polys    CSF culture and Gram stain    Collection Time: 07/14/21  3:26 PM    Specimen: Lumbar Puncture; Cerebrospinal Fluid   Result Value Ref Range    CSF Culture No growth    Fingerstick Glucose (POCT)    Collection Time: 07/14/21  5:20 PM   Result Value Ref Range    POC Glucose 109 65 - 140 mg/dl   Protime-INR - AM Draw    Collection Time: 07/15/21  6:12 AM   Result Value Ref Range    Protime 13 0 11 6 - 14 5 seconds    INR 1 03 0 84 - 1 19   CBC and differential    Collection Time: 07/15/21  6:12 AM   Result Value Ref Range    WBC 13 98 (H) 4 31 - 10 16 Thousand/uL    RBC 5 55 (H) 3 81 - 5 12 Million/uL    Hemoglobin 12 5 11 5 - 15 4 g/dL    Hematocrit 39 7 34 8 - 46 1 %    MCV 72 (L) 82 - 98 fL    MCH 22 5 (L) 26 8 - 34 3 pg    MCHC 31 5 31 4 - 37 4 g/dL    RDW 15 7 (H) 11 6 - 15 1 %    MPV 10 5 8 9 - 12 7 fL    Platelets 886 264 - 516 Thousands/uL    nRBC 0 /100 WBCs    Neutrophils Relative 93 (H) 43 - 75 %    Immat GRANS % 1 0 - 2 %    Lymphocytes Relative 6 (L) 14 - 44 %    Monocytes Relative 0 (L) 4 - 12 %    Eosinophils Relative 0 0 - 6 %    Basophils Relative 0 0 - 1 %    Neutrophils Absolute 13 02 (H) 1 85 - 7 62 Thousands/µL    Immature Grans Absolute 0 09 0 00 - 0 20 Thousand/uL    Lymphocytes Absolute 0 84 0 60 - 4 47 Thousands/µL    Monocytes Absolute 0 02 (L) 0 17 - 1 22 Thousand/µL    Eosinophils Absolute 0 00 0 00 - 0 61 Thousand/µL    Basophils Absolute 0 01 0 00 - 0 10 Thousands/µL   Basic metabolic panel    Collection Time: 07/15/21  6:12 AM   Result Value Ref Range    Sodium 140 136 - 145 mmol/L    Potassium 4 1 3 5 - 5 3 mmol/L    Chloride 105 100 - 108 mmol/L    CO2 22 21 - 32 mmol/L    ANION GAP 13 4 - 13 mmol/L    BUN 12 5 - 25 mg/dL    Creatinine 0 77 0 60 - 1 30 mg/dL    Glucose 141 (H) 65 - 140 mg/dL    Calcium 8 7 8 3 - 10 1 mg/dL    eGFR 114 ml/min/1 73sq m   Fingerstick Glucose (POCT)    Collection Time: 07/15/21  7:02 AM   Result Value Ref Range    POC Glucose 151 (H) 65 - 140 mg/dl   Fingerstick Glucose (POCT)    Collection Time: 07/15/21 11:12 AM   Result Value Ref Range    POC Glucose 155 (H) 65 - 140 mg/dl   ]  Imaging Studies: I have personally reviewed pertinent reports and I have personally reviewed pertinent films in PACS  EKG, Pathology, and Other Studies: I have personally reviewed pertinent reports  Counseling / Coordination of Care  Total time spent today 32 minutes  Greater than 50% of total time was spent with the patient and/or family counseling and/or coordination of care  A description of the counseling/coordination of care:  Patient was seen and evaluated  Discussed with attending  Chart reviewed thoroughly including laboratory and imaging studies  Plan of care discussed with patient and primary team  Discussed plan to continue IV Solumedrol with patient

## 2021-07-15 NOTE — PROGRESS NOTES
* MS (multiple sclerosis) ([facility] [address] )  Assessment & Plan  · Multiple sclerosis; diagnosed during this admission  · Presented with right-sided upper and lower extremity paresthesias and weakness   MRI: Extensive white matter disease suggesting advanced demyelinating disease of multiple sclerosis  Scattered supratentorial ring-enhancing lesions suggest active demyelination  · MRI of cervical and thoracic spine:  Multifocal cervical spinal cord signal abnormality, consistent with demyelinating disease  Patchy core signal abnormality at level T8-T9, consistent with demyelinating disease  · LP performed - CSF contains elevated WBC, RBC, and protein  · Neurology following    · Follow-up CSF culture and Gram stain   Serum MS panel pending   Continue IV methylprednisolone pulse treatment - continue for total of 5 days   F/U outpatient with MS attending in 4 weeks            Patellofemoral syndrome, right  Assessment & Plan  · Known  · Stable    VTE Pharmacologic Prophylaxis:   Pharmacologic: Enoxaparin (Lovenox)  Mechanical VTE Prophylaxis in Place: Yes    Discussions with Specialists or Other Care Team Provider: Care management team    Education and Discussions with Family / Patient: spoke with patient and  at bedside    Current Length of Stay: 1 day(s)    Current Patient Status: Inpatient     Discharge Plan / Estimated Discharge Date:  Pending completion of IV steroid course    Code Status: Level 1 - Full Code      Subjective: The patient reported continued episodes of parasthesias, pain, and paralysis on her right side  She had 5 episodes the day prior and 5 already this morning, with 2 happening while present  She denied any vision changes, bowel or bladder incontinence, fever, abdominal pain, nausea, vomiting, diarrhea, or constipation        Objective:     Vitals:   Temp (24hrs), Av 4 °F (36 9 °C), Min:97 9 °F (36 6 °C), Max:98 7 °F (37 1 °C)    Temp:  [97 9 °F (36 6 °C)-98 7 °F (37 1 °C)] 98 4 °F (36 9 °C)  HR:  [] 108  Resp:  [16-20] 20  BP: (117-127)/(60-96) 127/95  SpO2:  [92 %-100 %] 92 %  Body mass index is 25 89 kg/m²  Input and Output Summary (last 24 hours): Intake/Output Summary (Last 24 hours) at 7/15/2021 1318  Last data filed at 7/15/2021 0900  Gross per 24 hour   Intake 2661 67 ml   Output --   Net 2661 67 ml       Physical Exam:     Physical Exam  Constitutional:       Appearance: Normal appearance  She is normal weight  She is not ill-appearing or toxic-appearing  Eyes:      Extraocular Movements: Extraocular movements intact  Conjunctiva/sclera: Conjunctivae normal       Pupils: Pupils are equal, round, and reactive to light  Cardiovascular:      Rate and Rhythm: Normal rate and regular rhythm  Pulses: Normal pulses  Heart sounds: Normal heart sounds  Pulmonary:      Breath sounds: Normal breath sounds  No wheezing, rhonchi or rales  Musculoskeletal:         General: No swelling  Right lower leg: No edema  Left lower leg: No edema  Neurological:      General: No focal deficit present  Mental Status: She is alert and oriented to person, place, and time  Cranial Nerves: No cranial nerve deficit  Sensory: No sensory deficit  Motor: No weakness  Additional Data:     Labs:    Results from last 7 days   Lab Units 07/15/21  0612   WBC Thousand/uL 13 98*   HEMOGLOBIN g/dL 12 5   HEMATOCRIT % 39 7   PLATELETS Thousands/uL 281   NEUTROS PCT % 93*   LYMPHS PCT % 6*   MONOS PCT % 0*   EOS PCT % 0     Results from last 7 days   Lab Units 07/15/21  0612 07/13/21  1015   POTASSIUM mmol/L 4 1 3 8   CHLORIDE mmol/L 105 105   CO2 mmol/L 22 28   BUN mg/dL 12 11   CREATININE mg/dL 0 77 0 80   CALCIUM mg/dL 8 7 8 7   ALK PHOS U/L  --  50   ALT U/L  --  17   AST U/L  --  13     Results from last 7 days   Lab Units 07/15/21  0612   INR  1 03       * I Have Reviewed All Lab Data Listed Above  * Additional Pertinent Lab Tests Reviewed:  All Labs For Current Hospital Admission Reviewed    Imaging:    Imaging Reports Reviewed Today Include: MRI  Imaging Personally Reviewed by Myself Includes:  MRI    Recent Cultures (last 7 days):     Results from last 7 days   Lab Units 07/14/21  1525   GRAM STAIN RESULT  No bacteria seen  1+ Polys       Last 24 Hours Medication List:   Current Facility-Administered Medications   Medication Dose Route Frequency Provider Last Rate    acetaminophen  650 mg Oral Q6H PRN Ana Garcia MD      lidocaine  1 patch Topical Daily PRN Ana Garcia MD      methylPREDNISolone sodium succinate  1,000 mg Intravenous Daily Wiliam Chavira PA-C 1,000 mg (07/15/21 0932)    sodium chloride  100 mL/hr Intravenous Continuous uRth Vazquez  mL/hr (07/15/21 7997)        Today, Patient Was Seen By: Ana Garcia MD    ** Please Note: This note has been constructed using a voice recognition system   **

## 2021-07-15 NOTE — UTILIZATION REVIEW
Continued Stay Review    Date: 7/15                       Current Patient Class: INpatient   Current Level of Care: Med/surg    HPI:36 y o  female initially admitted on 7/14     Assessment/Plan:   · LP performed - CSF contains elevated WBC, RBC, and protein  Continue with IV steroids       Vital Signs:   /Time  Temp  Pulse  Resp  BP  MAP (mmHg)  SpO2  O2 Device  Patient Position - Orthostatic VS   07/15/21 0900  --  108Abnormal   --  --  --  92 %  None (Room air)  --   07/15/21 07:02:53  98 4 °F (36 9 °C)  69  --  127/95  106  98 %  --  --   07/15/21 00:31:34  --  89  --  123/95  104  99 %  --  --   07/14/21 23:56:07  --  85  --  124/96  105  98 %  --  --   07/14/21 2239  --  --  --  --  --  92 %         Pertinent Labs/Diagnostic Results:   7/15 MRI T SPine: Patchy cord signal abnormality at level T8-9 most consistent with demyelinating disease given findings in brain MRI   No active demyelination   No cord atrophy       Results from last 7 days   Lab Units 07/15/21  0612 07/13/21  1015   WBC Thousand/uL 13 98* 6 81   HEMOGLOBIN g/dL 12 5 12 3   HEMATOCRIT % 39 7 40 4   PLATELETS Thousands/uL 281 262   NEUTROS ABS Thousands/µL 13 02* 4 60         Results from last 7 days   Lab Units 07/15/21  0612 07/13/21  1015   SODIUM mmol/L 140 140   POTASSIUM mmol/L 4 1 3 8   CHLORIDE mmol/L 105 105   CO2 mmol/L 22 28   ANION GAP mmol/L 13 7   BUN mg/dL 12 11   CREATININE mg/dL 0 77 0 80   EGFR ml/min/1 73sq m 114 109   CALCIUM mg/dL 8 7 8 7     Results from last 7 days   Lab Units 07/13/21  1015   AST U/L 13   ALT U/L 17   ALK PHOS U/L 50   TOTAL PROTEIN g/dL 7 9   ALBUMIN g/dL 3 6   TOTAL BILIRUBIN mg/dL 0 37     Results from last 7 days   Lab Units 07/15/21  1112 07/15/21  0702 07/14/21  1720 07/14/21  1131 07/14/21  0721 07/13/21  2100   POC GLUCOSE mg/dl 155* 151* 109 113 101 87     Results from last 7 days   Lab Units 07/15/21  0612 07/13/21  1015   GLUCOSE RANDOM mg/dL 141* 96         Results from last 7 days   Lab Units 07/15/21  0612 07/14/21  1106   PROTIME seconds 13 0  --    INR  1 03  --    PTT seconds  --  33       Results from last 7 days   Lab Units 07/14/21  1525   GRAM STAIN RESULT  No bacteria seen  1+ Polys     Results from last 7 days   Lab Units 07/14/21  1524   TOTAL COUNTED  52     Results from last 7 days   Lab Units 07/14/21  1526 07/14/21  1525 07/14/21  1524   APPEARANCE CSF   --   --  clear   TUBE NUM CSF   --   --  4   WBC CSF /uL  --   --  28*   XANTHOCHROMIA   --   --  No   LYMPHS % (CSF) %  --   --  88   MONOCYTES % (CSF) %  --   --  12   GLUCOSE CSF mg/dL  --  62  --    PROTEIN CSF mg/dL  --  72*  --    RBC CSF uL  --  15*  --    CSF CULTURE  No growth  --   --          Medications:   Scheduled Medications:  methylPREDNISolone sodium succinate, 1,000 mg, Intravenous, Daily      Continuous IV Infusions:  sodium chloride, 100 mL/hr, Intravenous, Continuous      PRN Meds:  acetaminophen, 650 mg, Oral, Q6H PRN  lidocaine, 1 patch, Topical, Daily PRN        Discharge Plan: D  Network Utilization Review Department  ATTENTION: Please call with any questions or concerns to [phone] and carefully listen to the prompts so that you are directed to the right person  All voicemails are confidential   [facility] all requests for admission clinical reviews, approved or denied determinations and any other requests to dedicated fax number below belonging to the campus where the patient is receiving treatment   List of dedicated fax numbers for the Facilities:  [address] [place] [address] DENIALS (Administrative/Medical Necessity) [phone]   [address] (Maternity/NICU/Pediatrics) 261 [facility] [address] [place] [address] [facility] [address] [id number] [facility] Yonathan Rodas [address] [facility] [phone]

## 2021-07-16 LAB
ANION GAP SERPL CALCULATED.3IONS-SCNC: 10 MMOL/L (ref 4–13)
AQP4 H2O CHANNEL AB SERPL IA-ACNC: <1.5 U/ML (ref 0–3)
BASOPHILS # BLD AUTO: 0.02 THOUSANDS/ΜL (ref 0–0.1)
BASOPHILS NFR BLD AUTO: 0 % (ref 0–1)
BUN SERPL-MCNC: 10 MG/DL (ref 5–25)
CALCIUM SERPL-MCNC: 8.3 MG/DL (ref 8.3–10.1)
CHLORIDE SERPL-SCNC: 107 MMOL/L (ref 100–108)
CO2 SERPL-SCNC: 24 MMOL/L (ref 21–32)
CREAT SERPL-MCNC: 0.74 MG/DL (ref 0.6–1.3)
EOSINOPHIL # BLD AUTO: 0 THOUSAND/ΜL (ref 0–0.61)
EOSINOPHIL NFR BLD AUTO: 0 % (ref 0–6)
ERYTHROCYTE [DISTWIDTH] IN BLOOD BY AUTOMATED COUNT: 15.9 % (ref 11.6–15.1)
GFR SERPL CREATININE-BSD FRML MDRD: 120 ML/MIN/1.73SQ M
GLUCOSE SERPL-MCNC: 107 MG/DL (ref 65–140)
GLUCOSE SERPL-MCNC: 131 MG/DL (ref 65–140)
GLUCOSE SERPL-MCNC: 132 MG/DL (ref 65–140)
GLUCOSE SERPL-MCNC: 150 MG/DL (ref 65–140)
GLUCOSE SERPL-MCNC: 154 MG/DL (ref 65–140)
HCT VFR BLD AUTO: 34.7 % (ref 34.8–46.1)
HGB BLD-MCNC: 10.8 G/DL (ref 11.5–15.4)
IMM GRANULOCYTES # BLD AUTO: 0.11 THOUSAND/UL (ref 0–0.2)
IMM GRANULOCYTES NFR BLD AUTO: 1 % (ref 0–2)
LYMPHOCYTES # BLD AUTO: 1.08 THOUSANDS/ΜL (ref 0.6–4.47)
LYMPHOCYTES NFR BLD AUTO: 6 % (ref 14–44)
MCH RBC QN AUTO: 22.5 PG (ref 26.8–34.3)
MCHC RBC AUTO-ENTMCNC: 31.1 G/DL (ref 31.4–37.4)
MCV RBC AUTO: 72 FL (ref 82–98)
MONOCYTES # BLD AUTO: 0.68 THOUSAND/ΜL (ref 0.17–1.22)
MONOCYTES NFR BLD AUTO: 4 % (ref 4–12)
NEUTROPHILS # BLD AUTO: 17.75 THOUSANDS/ΜL (ref 1.85–7.62)
NEUTS SEG NFR BLD AUTO: 89 % (ref 43–75)
NRBC BLD AUTO-RTO: 0 /100 WBCS
PLATELET # BLD AUTO: 240 THOUSANDS/UL (ref 149–390)
PMV BLD AUTO: 9.8 FL (ref 8.9–12.7)
POTASSIUM SERPL-SCNC: 3.9 MMOL/L (ref 3.5–5.3)
RBC # BLD AUTO: 4.8 MILLION/UL (ref 3.81–5.12)
SODIUM SERPL-SCNC: 141 MMOL/L (ref 136–145)
WBC # BLD AUTO: 19.64 THOUSAND/UL (ref 4.31–10.16)

## 2021-07-16 PROCEDURE — 99232 SBSQ HOSP IP/OBS MODERATE 35: CPT | Performed by: STUDENT IN AN ORGANIZED HEALTH CARE EDUCATION/TRAINING PROGRAM

## 2021-07-16 PROCEDURE — 82948 REAGENT STRIP/BLOOD GLUCOSE: CPT

## 2021-07-16 PROCEDURE — 80048 BASIC METABOLIC PNL TOTAL CA: CPT | Performed by: STUDENT IN AN ORGANIZED HEALTH CARE EDUCATION/TRAINING PROGRAM

## 2021-07-16 PROCEDURE — 85025 COMPLETE CBC W/AUTO DIFF WBC: CPT | Performed by: STUDENT IN AN ORGANIZED HEALTH CARE EDUCATION/TRAINING PROGRAM

## 2021-07-16 RX ORDER — METHOCARBAMOL 500 MG/1
500 TABLET, FILM COATED ORAL EVERY 6 HOURS PRN
Status: DISCONTINUED | OUTPATIENT
Start: 2021-07-16 | End: 2021-07-18 | Stop reason: HOSPADM

## 2021-07-16 RX ADMIN — METHOCARBAMOL 500 MG: 500 TABLET ORAL at 15:55

## 2021-07-16 RX ADMIN — SODIUM CHLORIDE 1000 MG: 0.9 INJECTION, SOLUTION INTRAVENOUS at 08:51

## 2021-07-16 RX ADMIN — ACETAMINOPHEN 650 MG: 325 TABLET, FILM COATED ORAL at 19:06

## 2021-07-16 NOTE — PROGRESS NOTES
* MS (multiple sclerosis) ([facility] [address] )  Assessment & Plan  · Multiple sclerosis; diagnosed during this admission  · Presented with right-sided upper and lower extremity paresthesias and weakness   MRI: Extensive white matter disease suggesting advanced demyelinating disease of multiple sclerosis  Scattered supratentorial ring-enhancing lesions suggest active demyelination  · MRI of cervical and thoracic spine:  Multifocal cervical spinal cord signal abnormality, consistent with demyelinating disease  Patchy core signal abnormality at level T8-T9, consistent with demyelinating disease  No active demyelination  · LP performed - CSF contains elevated WBC, RBC, and protein  EEG: recorded during an episode, no changes seen  Does not fully exclude the possibility of a focal aware seizure (simple partial seizure)  · CSF culture and Gram stain negative  · Neurology following     Serum MS panel pending   Continue IV methylprednisolone pulse treatment - continue for total of 5 days   F/U outpatient with MS attending in 4 weeks            Patellofemoral syndrome, right  Assessment & Plan  · Known  · Stable    VTE Pharmacologic Prophylaxis:   Pharmacologic: Enoxaparin (Lovenox)  Mechanical VTE Prophylaxis in Place: Yes    Discussions with Specialists or Other Care Team Provider: Care management team    Education and Discussions with Family / Patient: spoke with patient at bedside    Current Length of Stay: 2 day(s)    Current Patient Status: Inpatient     Discharge Plan / Estimated Discharge Date: 07/17/2021    Code Status: Level 1 - Full Code      Subjective: The patient reports continued episodes of right sided paresthesias and paralysis  She had experienced 4 episodes before I saw her this morning, each lasting 10-15 seconds, and experienced 2 episodes of about a minute each while I was present   She associated the shorter episodes with standing from a seated position or turning to her right, but says the long episodes are more sporadic and unpredictable  She denies shortness of breath, chest pain, bowel or bladder incontinence, fever, chills, nausea, vomiting, diarrhea, constipation, dysuria, or increased urinary frequency  Objective:     Vitals:   Temp (24hrs), Av 9 °F (37 2 °C), Min:98 9 °F (37 2 °C), Max:98 9 °F (37 2 °C)    Temp:  [98 9 °F (37 2 °C)] 98 9 °F (37 2 °C)  HR:  [] 91  Resp:  [16] 16  BP: (115-129)/(62-89) 115/62  SpO2:  [95 %-98 %] 95 %  Body mass index is 25 89 kg/m²  Input and Output Summary (last 24 hours): Intake/Output Summary (Last 24 hours) at 2021 1427  Last data filed at 2021 0900  Gross per 24 hour   Intake 2050 ml   Output --   Net 2050 ml       Physical Exam:     Physical Exam  Constitutional:       Appearance: Normal appearance  She is normal weight  She is not ill-appearing or toxic-appearing  Eyes:      Extraocular Movements: Extraocular movements intact  Conjunctiva/sclera: Conjunctivae normal       Pupils: Pupils are equal, round, and reactive to light  Cardiovascular:      Rate and Rhythm: Normal rate and regular rhythm  Heart sounds: Normal heart sounds  Pulmonary:      Breath sounds: Normal breath sounds  No wheezing, rhonchi or rales  Skin:     General: Skin is warm and dry  Coloration: Skin is not pale  Neurological:      General: No focal deficit present  Mental Status: She is oriented to person, place, and time  Sensory: No sensory deficit  Motor: No weakness        Deep Tendon Reflexes: Reflexes normal            Additional Data:     Labs:    Results from last 7 days   Lab Units 21   WBC Thousand/uL 19 64*   HEMOGLOBIN g/dL 10 8*   HEMATOCRIT % 34 7*   PLATELETS Thousands/uL 240   NEUTROS PCT % 89*   LYMPHS PCT % 6*   MONOS PCT % 4   EOS PCT % 0     Results from last 7 days   Lab Units 21  1015   POTASSIUM mmol/L 3 9 3 8   CHLORIDE mmol/L 107 105   CO2 mmol/L 24 28   BUN mg/dL 10 11 CREATININE mg/dL 0 74 0 80   CALCIUM mg/dL 8 3 8 7   ALK PHOS U/L  --  50   ALT U/L  --  17   AST U/L  --  13     Results from last 7 days   Lab Units 07/15/21  0612   INR  1 03       * I Have Reviewed All Lab Data Listed Above  * Additional Pertinent Lab Tests Reviewed: [address] 65 Admission Reviewed    Imaging:    Imaging Reports Reviewed Today Include: MRI of cervical and thoracic spine  Imaging Personally Reviewed by Myself Includes: MRI of cervical and thoracic spine    Recent Cultures (last 7 days):     Results from last 7 days   Lab Units 07/14/21  1525   GRAM STAIN RESULT  No bacteria seen  1+ Polys       Last 24 Hours Medication List:   Current Facility-Administered Medications   Medication Dose Route Frequency Provider Last Rate    acetaminophen  650 mg Oral Q6H PRN Elvie Munroe MD      lidocaine  1 patch Topical Daily PRN Elvie Munroe MD      methylPREDNISolone sodium succinate  1,000 mg Intravenous Daily Fidencio Key PA-C 1,000 mg (07/16/21 0851)        Today, Patient Was Seen By: Elvie Munroe MD    ** Please Note: This note has been constructed using a voice recognition system   **

## 2021-07-16 NOTE — QUICK NOTE
Routine EEG completed on 07/15/2021 captured a clinical event where patient was noted to cover herself with a blanket, raise her hand, and indicate the EEG technician that she was having an event  During the event patient was able to was per her name  She cannot state what to was experiencing during the time of the event  Electrographically, there was no EEG changes during the time of her event  When patient was evaluated by Neurology on 07/15/2022 on, patient did confirm that she had one of her episodes during the routine EEG  Cannot fully exclude the possibility of a simple partial seizure, however based on clinical presentation and routine EEG report, low suspicion for true electrographic seizures as the etiology for her events  Stronger suspicion for nonepileptic/psychogenic etiology, likely stress reaction

## 2021-07-16 NOTE — CASE MANAGEMENT
CM met with patient at bedside  Patient's Sun Galvan is present  Patient states they are not legally  however they have been together 17 yrs  Patient states she live with Royal Ashley and her three children ages 21, 8 and 5 in their bilevel house  There are 7 SAGE  There are another 7 stairs to the main level downstairs where the bedroom and kitchen are  Patient states she is independent with adls  Patient denies rehab and [facility] hx  Patient names Abimael kuhn  patient quit smoking 10 yrs ago  Patient names her mother- Sudhir Rodriguez as health representative  Patient is given information on advance directive  Patient is employed  Patient drives to appointment  Upon clearance for discharge patient will be transported home via [email or web address]  CM to follow needs  Treatment team informed  CM reviewed discharge planning process including the following: identifying help at home, patient preference for discharge planning needs, pharmacy preference, and availability of treatment team to discuss questions or concerns patient and/or family may have regarding understanding medications and recognizing signs and symptoms once discharged  CM also encouraged patient to follow up with all recommended appointments after discharge  Patient advised of importance for patient and family to participate in managing patients medical well being

## 2021-07-16 NOTE — PLAN OF CARE
Problem: NEUROSENSORY - ADULT  Goal: Achieves stable or improved neurological status  Description: INTERVENTIONS  - Monitor and report changes in neurological status  - Monitor vital signs such as temperature, blood pressure, glucose, and any other labs ordered   - Initiate measures to prevent increased intracranial pressure  - Monitor for seizure activity and implement precautions if appropriate      Outcome: Progressing  Goal: Achieves maximal functionality and self care  Description: INTERVENTIONS  - Monitor swallowing and airway patency with patient fatigue and changes in neurological status  - Encourage and assist patient to increase activity and self care     - Encourage visually impaired, hearing impaired and aphasic patients to use assistive/communication devices  Outcome: Progressing     Problem: PAIN - ADULT  Goal: Verbalizes/displays adequate comfort level or baseline comfort level  Description: Interventions:  - Encourage patient to monitor pain and request assistance  - Assess pain using appropriate pain scale  - Administer analgesics based on type and severity of pain and evaluate response  - Implement non-pharmacological measures as appropriate and evaluate response  - Consider cultural and social influences on pain and pain management  - Notify physician/advanced practitioner if interventions unsuccessful or patient reports new pain  Outcome: Progressing     Problem: DISCHARGE PLANNING  Goal: Discharge to home or other facility with appropriate resources  Description: INTERVENTIONS:  - Identify barriers to discharge w/patient and caregiver  - Arrange for needed discharge resources and transportation as appropriate  - Identify discharge learning needs (meds, wound care, etc )  - Arrange for interpretive services to assist at discharge as needed  - Refer to Case Management Department for coordinating discharge planning if the patient needs post-hospital services based on physician/advanced practitioner order or complex needs related to functional status, cognitive ability, or social support system  Outcome: Progressing     Problem: Knowledge Deficit  Goal: Patient/family/caregiver demonstrates understanding of disease process, treatment plan, medications, and discharge instructions  Description: Complete learning assessment and assess knowledge base    Interventions:  - Provide teaching at level of understanding  - Provide teaching via preferred learning methods  Outcome: Progressing     Problem: Potential for Falls  Goal: Patient will remain free of falls  Description: INTERVENTIONS:  - Educate patient/family on patient safety including physical limitations  - Instruct patient to call for assistance with activity   - Consult OT/PT to assist with strengthening/mobility   - Keep Call bell within reach  - Keep bed low and locked with side rails adjusted as appropriate  - Keep care items and personal belongings within reach  - Initiate and maintain comfort rounds  - Make Fall Risk Sign visible to staff  - Offer Toileting every  Hours, in advance of need  - Initiate/Maintain alarm  - Obtain necessary fall risk management equipment: Apply yellow socks and bracelet for high fall risk patients  - Consider moving patient to room near nurses station  Outcome: Progressing

## 2021-07-17 LAB
ANION GAP SERPL CALCULATED.3IONS-SCNC: 6 MMOL/L (ref 4–13)
ATRIAL RATE: 54 BPM
BACTERIA CSF CULT: NO GROWTH
BASOPHILS # BLD AUTO: 0.01 THOUSANDS/ΜL (ref 0–0.1)
BASOPHILS NFR BLD AUTO: 0 % (ref 0–1)
BUN SERPL-MCNC: 14 MG/DL (ref 5–25)
CALCIUM SERPL-MCNC: 8.3 MG/DL (ref 8.3–10.1)
CHLORIDE SERPL-SCNC: 106 MMOL/L (ref 100–108)
CO2 SERPL-SCNC: 26 MMOL/L (ref 21–32)
CREAT SERPL-MCNC: 0.74 MG/DL (ref 0.6–1.3)
EOSINOPHIL # BLD AUTO: 0 THOUSAND/ΜL (ref 0–0.61)
EOSINOPHIL NFR BLD AUTO: 0 % (ref 0–6)
ERYTHROCYTE [DISTWIDTH] IN BLOOD BY AUTOMATED COUNT: 15.8 % (ref 11.6–15.1)
GFR SERPL CREATININE-BSD FRML MDRD: 120 ML/MIN/1.73SQ M
GLUCOSE SERPL-MCNC: 103 MG/DL (ref 65–140)
GLUCOSE SERPL-MCNC: 118 MG/DL (ref 65–140)
GLUCOSE SERPL-MCNC: 134 MG/DL (ref 65–140)
GLUCOSE SERPL-MCNC: 184 MG/DL (ref 65–140)
HCT VFR BLD AUTO: 34.3 % (ref 34.8–46.1)
HGB BLD-MCNC: 10.6 G/DL (ref 11.5–15.4)
IMM GRANULOCYTES # BLD AUTO: 0.15 THOUSAND/UL (ref 0–0.2)
IMM GRANULOCYTES NFR BLD AUTO: 1 % (ref 0–2)
LYMPHOCYTES # BLD AUTO: 1.4 THOUSANDS/ΜL (ref 0.6–4.47)
LYMPHOCYTES NFR BLD AUTO: 10 % (ref 14–44)
MCH RBC QN AUTO: 22.2 PG (ref 26.8–34.3)
MCHC RBC AUTO-ENTMCNC: 30.9 G/DL (ref 31.4–37.4)
MCV RBC AUTO: 72 FL (ref 82–98)
MONOCYTES # BLD AUTO: 0.63 THOUSAND/ΜL (ref 0.17–1.22)
MONOCYTES NFR BLD AUTO: 4 % (ref 4–12)
NEUTROPHILS # BLD AUTO: 12.61 THOUSANDS/ΜL (ref 1.85–7.62)
NEUTS SEG NFR BLD AUTO: 85 % (ref 43–75)
NRBC BLD AUTO-RTO: 0 /100 WBCS
P AXIS: 47 DEGREES
PLATELET # BLD AUTO: 242 THOUSANDS/UL (ref 149–390)
PMV BLD AUTO: 10.3 FL (ref 8.9–12.7)
POTASSIUM SERPL-SCNC: 3.9 MMOL/L (ref 3.5–5.3)
PR INTERVAL: 160 MS
QRS AXIS: 13 DEGREES
QRSD INTERVAL: 80 MS
QT INTERVAL: 394 MS
QTC INTERVAL: 373 MS
RBC # BLD AUTO: 4.78 MILLION/UL (ref 3.81–5.12)
SODIUM SERPL-SCNC: 138 MMOL/L (ref 136–145)
T WAVE AXIS: 14 DEGREES
VENTRICULAR RATE: 54 BPM
WBC # BLD AUTO: 14.8 THOUSAND/UL (ref 4.31–10.16)

## 2021-07-17 PROCEDURE — 93010 ELECTROCARDIOGRAM REPORT: CPT | Performed by: INTERNAL MEDICINE

## 2021-07-17 PROCEDURE — 99232 SBSQ HOSP IP/OBS MODERATE 35: CPT | Performed by: STUDENT IN AN ORGANIZED HEALTH CARE EDUCATION/TRAINING PROGRAM

## 2021-07-17 PROCEDURE — 85025 COMPLETE CBC W/AUTO DIFF WBC: CPT | Performed by: STUDENT IN AN ORGANIZED HEALTH CARE EDUCATION/TRAINING PROGRAM

## 2021-07-17 PROCEDURE — 80048 BASIC METABOLIC PNL TOTAL CA: CPT | Performed by: STUDENT IN AN ORGANIZED HEALTH CARE EDUCATION/TRAINING PROGRAM

## 2021-07-17 PROCEDURE — 82948 REAGENT STRIP/BLOOD GLUCOSE: CPT

## 2021-07-17 PROCEDURE — 93005 ELECTROCARDIOGRAM TRACING: CPT

## 2021-07-17 RX ORDER — IBUPROFEN 400 MG/1
400 TABLET ORAL EVERY 8 HOURS PRN
Status: DISCONTINUED | OUTPATIENT
Start: 2021-07-17 | End: 2021-07-18 | Stop reason: HOSPADM

## 2021-07-17 RX ADMIN — IBUPROFEN 400 MG: 400 TABLET ORAL at 09:06

## 2021-07-17 RX ADMIN — SODIUM CHLORIDE 1000 MG: 0.9 INJECTION, SOLUTION INTRAVENOUS at 08:07

## 2021-07-17 NOTE — NURSING NOTE
Patient's heart rate bradycardic while sleeping in the 30s  Abdirahman Cohn made aware  All other VS stable, patient alert and oriented  HR improved to 50 while awake

## 2021-07-17 NOTE — PROGRESS NOTES
[address] [facility]  Progress Note - Yissel Bring 1985, 39 y o  female MRN: [id number]  Unit/Bed#: -01 Encounter: [id number]  Primary Care Provider: Horacio Murillo MD   Date and time admitted to hospital: 7/13/2021  9:41 AM    * MS (multiple sclerosis) ([facility] [address] )  Assessment & Plan  · Multiple sclerosis; diagnosed during this admission  · Presented with right-sided upper and lower extremity paresthesias and weakness   MRI: Extensive white matter disease suggesting advanced demyelinating disease of multiple sclerosis  Scattered supratentorial ring-enhancing lesions suggest active demyelination  · MRI of cervical and thoracic spine:  Multifocal cervical spinal cord signal abnormality, consistent with demyelinating disease  Patchy core signal abnormality at level T8-T9, consistent with demyelinating disease  No active demyelination  · LP performed - CSF contains elevated WBC, RBC, and protein  · EEG: recorded during an episode, no changes seen  Does not fully exclude the possibility of a focal aware seizure (simple partial seizure)  Possibly psychogenic seizure  · CSF culture and Gram stain negative  · Antibody for neuromyelitis optica negative  · Neurology following     Serum MS panel pending   Continue IV methylprednisolone pulse treatment - continue for total of 5 days; course will end 7/18   F/U outpatient with MS attending in 4 weeks            Patellofemoral syndrome, right  Assessment & Plan  · Known  · Stable          VTE Pharmacologic Prophylaxis:   VTE Score: 1 Low Risk (Score 0-2) - Encourage Ambulation  Mechanical VTE Prophylaxis in Place: Yes    Patient Centered Rounds: I have performed bedside rounds with nursing staff today  Discussions with Specialists or Other Care Team Provider:  Nurse    Current Length of Stay: 3 day(s)    Current Patient Status: Inpatient     Discharge Plan / Estimated Discharge Date: Anticipate discharge tomorrow to home      Code Status: Level 1 - Full Code      Subjective:   Patient was seen and examined by me at bedside  Communicated clearly  Hemodynamically stable and afebrile  Patient states improvement in duration of her episodes  Reports they still occur at the same frequency  Overall reports she feels like she is improving  Objective:     Vitals:   Temp (24hrs), Av 4 °F (36 9 °C), Min:98 2 °F (36 8 °C), Max:98 6 °F (37 °C)    Temp:  [98 2 °F (36 8 °C)-98 6 °F (37 °C)] 98 3 °F (36 8 °C)  HR:  [43-50] 43  Resp:  [14-18] 18  BP: (115-163)/(60-76) 133/75  SpO2:  [97 %-100 %] 98 %  Body mass index is 25 89 kg/m²  Input and Output Summary (last 24 hours): Intake/Output Summary (Last 24 hours) at 2021 0912  Last data filed at 2021 2327  Gross per 24 hour   Intake 1480 ml   Output --   Net 1480 ml       Physical Exam:     Physical Exam  Constitutional:       Appearance: Normal appearance  HENT:      Head: Normocephalic and atraumatic  Eyes:      General: No scleral icterus  Right eye: No discharge  Left eye: No discharge  Cardiovascular:      Rate and Rhythm: Normal rate and regular rhythm  Pulses: Normal pulses  Heart sounds: Normal heart sounds  Pulmonary:      Effort: Pulmonary effort is normal  No respiratory distress  Breath sounds: Normal breath sounds  No wheezing  Abdominal:      General: There is no distension  Palpations: Abdomen is soft  Tenderness: There is no abdominal tenderness  Musculoskeletal:         General: No swelling or tenderness  Normal range of motion  Cervical back: Normal range of motion  No muscular tenderness  Skin:     General: Skin is warm and dry  Neurological:      General: No focal deficit present  Mental Status: She is alert and oriented to person, place, and time     Psychiatric:         Mood and Affect: Mood normal          Behavior: Behavior normal           Additional Data:     Labs:  Results from last 7 days   Lab Units 07/17/21  0446   WBC Thousand/uL 14 80*   HEMOGLOBIN g/dL 10 6*   HEMATOCRIT % 34 3*   PLATELETS Thousands/uL 242   NEUTROS PCT % 85*   LYMPHS PCT % 10*   MONOS PCT % 4   EOS PCT % 0     Results from last 7 days   Lab Units 07/17/21  0446 07/13/21  1015   SODIUM mmol/L 138 140   POTASSIUM mmol/L 3 9 3 8   CHLORIDE mmol/L 106 105   CO2 mmol/L 26 28   BUN mg/dL 14 11   CREATININE mg/dL 0 74 0 80   ANION GAP mmol/L 6 7   CALCIUM mg/dL 8 3 8 7   ALBUMIN g/dL  --  3 6   TOTAL BILIRUBIN mg/dL  --  0 37   ALK PHOS U/L  --  50   ALT U/L  --  17   AST U/L  --  13   GLUCOSE RANDOM mg/dL 118 96     Results from last 7 days   Lab Units 07/15/21  0612   INR  1 03     Results from last 7 days   Lab Units 07/17/21  0743 07/16/21  2136 07/16/21  1614 07/16/21  1141 07/16/21  0742 07/15/21  2105 07/15/21  1638 07/15/21  1112 07/15/21  0702 07/14/21  1720 07/14/21  1131 07/14/21  0721   POC GLUCOSE mg/dl 103 131 150* 154* 107 147* 186* 155* 151* 109 113 101               Imaging: Reviewed radiology reports from this admission including: CT head, MRI brain and MRI spine    Recent Cultures (last 7 days):     Results from last 7 days   Lab Units 07/14/21  1525   GRAM STAIN RESULT  No bacteria seen  1+ Polys       Lines/Drains:  Invasive Devices     Peripheral Intravenous Line            Peripheral IV 07/15/21 Left Arm 2 days                Telemetry:        Last 24 Hours Medication List:   Current Facility-Administered Medications   Medication Dose Route Frequency Provider Last Rate    acetaminophen  650 mg Oral Q6H PRN Cristy Ying MD      ibuprofen  400 mg Oral Q8H PRN Cristy Ying MD      lidocaine  1 patch Topical Daily PRN Cristy Ying MD      methocarbamol  500 mg Oral Q6H PRN Zainab Velazco MD      methylPREDNISolone sodium succinate  1,000 mg Intravenous Daily Armida Deluca PA-C 1,000 mg (07/17/21 8832)        Today, Patient Was Seen By: Cristy Ying MD    ** Please Note: This note has been constructed using a voice recognition system   **

## 2021-07-18 VITALS
OXYGEN SATURATION: 99 % | HEIGHT: 61 IN | WEIGHT: 137 LBS | TEMPERATURE: 97.6 F | HEART RATE: 55 BPM | RESPIRATION RATE: 16 BRPM | DIASTOLIC BLOOD PRESSURE: 60 MMHG | SYSTOLIC BLOOD PRESSURE: 121 MMHG | BODY MASS INDEX: 25.86 KG/M2

## 2021-07-18 LAB
BASOPHILS # BLD AUTO: 0.01 THOUSANDS/ΜL (ref 0–0.1)
BASOPHILS NFR BLD AUTO: 0 % (ref 0–1)
EOSINOPHIL # BLD AUTO: 0 THOUSAND/ΜL (ref 0–0.61)
EOSINOPHIL NFR BLD AUTO: 0 % (ref 0–6)
ERYTHROCYTE [DISTWIDTH] IN BLOOD BY AUTOMATED COUNT: 15.6 % (ref 11.6–15.1)
GLUCOSE SERPL-MCNC: 107 MG/DL (ref 65–140)
HCT VFR BLD AUTO: 36.6 % (ref 34.8–46.1)
HGB BLD-MCNC: 11.4 G/DL (ref 11.5–15.4)
IMM GRANULOCYTES # BLD AUTO: 0.14 THOUSAND/UL (ref 0–0.2)
IMM GRANULOCYTES NFR BLD AUTO: 1 % (ref 0–2)
LYMPHOCYTES # BLD AUTO: 1.72 THOUSANDS/ΜL (ref 0.6–4.47)
LYMPHOCYTES NFR BLD AUTO: 12 % (ref 14–44)
MCH RBC QN AUTO: 22.1 PG (ref 26.8–34.3)
MCHC RBC AUTO-ENTMCNC: 31.1 G/DL (ref 31.4–37.4)
MCV RBC AUTO: 71 FL (ref 82–98)
MONOCYTES # BLD AUTO: 0.84 THOUSAND/ΜL (ref 0.17–1.22)
MONOCYTES NFR BLD AUTO: 6 % (ref 4–12)
NEUTROPHILS # BLD AUTO: 11.38 THOUSANDS/ΜL (ref 1.85–7.62)
NEUTS SEG NFR BLD AUTO: 81 % (ref 43–75)
NRBC BLD AUTO-RTO: 0 /100 WBCS
PLATELET # BLD AUTO: 278 THOUSANDS/UL (ref 149–390)
PMV BLD AUTO: 11.2 FL (ref 8.9–12.7)
RBC # BLD AUTO: 5.16 MILLION/UL (ref 3.81–5.12)
WBC # BLD AUTO: 14.09 THOUSAND/UL (ref 4.31–10.16)

## 2021-07-18 PROCEDURE — 85025 COMPLETE CBC W/AUTO DIFF WBC: CPT | Performed by: INTERNAL MEDICINE

## 2021-07-18 PROCEDURE — 99239 HOSP IP/OBS DSCHRG MGMT >30: CPT | Performed by: STUDENT IN AN ORGANIZED HEALTH CARE EDUCATION/TRAINING PROGRAM

## 2021-07-18 PROCEDURE — 82948 REAGENT STRIP/BLOOD GLUCOSE: CPT

## 2021-07-18 RX ORDER — LIDOCAINE 50 MG/G
1 PATCH TOPICAL DAILY PRN
Qty: 15 PATCH | Refills: 0 | Status: SHIPPED | OUTPATIENT
Start: 2021-07-18 | End: 2022-05-24

## 2021-07-18 RX ORDER — MELATONIN
1000 DAILY
Qty: 30 TABLET | Refills: 0 | Status: SHIPPED | OUTPATIENT
Start: 2021-07-18 | End: 2022-05-24

## 2021-07-18 RX ADMIN — SODIUM CHLORIDE 1000 MG: 0.9 INJECTION, SOLUTION INTRAVENOUS at 09:15

## 2021-07-18 NOTE — DISCHARGE SUMMARY
[address] [facility]  [facility] 1985[facility] 39 [facility]  [facility] MRN: [id number]  Unit/Bed#: -01 Encounter: [id number]  Primary Care Provider: Toan Foreman MD   Date and time admitted to hospital: 7/13/2021  9:41 AM    * MS (multiple sclerosis) ([facility] [address] )  Assessment & Plan  · Multiple sclerosis; diagnosed during this admission  · Presented with right-sided upper and lower extremity paresthesias and weakness   MRI: Extensive white matter disease suggesting advanced demyelinating disease of multiple sclerosis  Scattered supratentorial ring-enhancing lesions suggest active demyelination  · MRI of cervical and thoracic spine:  Multifocal cervical spinal cord signal abnormality, consistent with demyelinating disease  Patchy core signal abnormality at level T8-T9, consistent with demyelinating disease  No active demyelination  · LP performed - CSF contains elevated WBC, RBC, and protein  · EEG: recorded during an episode, no changes seen  Does not fully exclude the possibility of a focal aware seizure (simple partial seizure)    Possibly psychogenic seizure  · CSF culture and Gram stain negative  · Antibody for neuromyelitis optica negative  · Neurology following  · She received 5 dose of IV Solu-Medrol 1gm daily pulse treatment     Serum MS panel pending - follow up with MS specialist    F/U outpatient with MS attending in 4 weeks            Patellofemoral syndrome, right  Assessment & Plan  · Known  · Stable    Discharging Resident Physician: Guillermina Cadena MD  Attending: Jc Perdue MD  PCP: Toan Foreman MD  Admission Date: 7/13/2021  Admission Date:   Admission Orders (From admission, onward)     Ordered        07/14/21 1851  Inpatient Admission  Once         07/13/21 1206  Place in Observation  Once                   Discharge Date: 07/18/21    Disposition:     Home    Reason for Admission:  Paresthesia and numbness of the right upper and lower extremity    Consultations During Hospital Stay:  · Neurology  · Interventional Radiology  ·     Procedures Performed:     No orders of the defined types were placed in this encounter  Diagnosis:    Medical Problems     Resolved Problems  Date Reviewed: 7/18/2021    None                Principal Problem:    [facility]  Active Problems:    Patellofemoral syndrome, right    Weakness      Significant Findings / Test Results:     MRI BRAIN WITH AND WITHOUT CONTRAST     INDICATION: Recurrent transient paresthesia and weakness in right upper and lower extremity        COMPARISON:  Head CT from July 13, 2021      TECHNIQUE:  Sagittal T1, axial T2, axial FLAIR, axial T1, axial [place], axial diffusion  Sagittal, axial T1 postcontrast   Axial bravo postcontrast with coronal reconstructions  Imaging performed on 1 5T MRI       IV Contrast:  6 mL of Gadobutrol injection (SINGLE-DOSE)      IMAGE QUALITY: Diagnostic      FINDINGS:     BRAIN PARENCHYMA:  No diffusion restriction to suggest acute infarction or demyelination  Extensive white matter signal abnormality scattered throughout the cerebral hemispheres, brainstem and cerebellum  Innumerable patchy and focal subcortical, deep   cerebral, periventricular signal abnormality is noted with thinning of the corpus callosum  There are multiple pericallosal lesions along with lesions in the deep cerebral white matter extending from the internal capsules to the cerebral peduncles,   midbrain and renate as well as the medulla  Additional lesions are noted within the left more than right brachium pontis and bilateral cerebellar hemispheres  These findings are highly suspicious for multiple sclerosis  There are multiple ring-enhancing   lesions scattered throughout the right more than left frontal white matter  There is also a enhancing lesion within the left subthalamic region / cerebral peduncle    A tiny focus of left occipital subcortical enhancement is noted on image 49 of series 13   The largest cerebral demyelinating plaque measures approximately 1 3 cm in the right posterior periventricular white matter on image 18 of series 10        There is mild generalized parenchymal volume loss      VENTRICLES:  Normal for the patient's age      SELLA AND PITUITARY GLAND:  Normal      ORBITS:  Normal      PARANASAL SINUSES:  Normal      VASCULATURE:  Evaluation of the major intracranial vasculature demonstrates appropriate flow voids      CALVARIUM AND SKULL BASE:  Normal      EXTRACRANIAL SOFT TISSUES:  Normal      IMPRESSION:     Extensive white matter disease suggesting advanced demyelinating disease of multiple sclerosis  Scattered supratentorial ring-enhancing lesions suggest active demyelination  Serologic correlation is recommended  *MRI CERVICAL SPINE WITH AND WITHOUT CONTRAST     INDICATION: Demyelinating lesions      COMPARISON:  None      TECHNIQUE:  Sagittal T1, sagittal T2, sagittal inversion recovery, axial 2D merge and axial T2  Sagittal T1 and axial T1 postcontrast     IV Contrast:  6 mL of Gadobutrol injection (SINGLE-DOSE)      IMAGE QUALITY:  Diagnostic      FINDINGS:     ALIGNMENT:  Normal alignment of the cervical spine  No compression fracture  No subluxation  No scoliosis      MARROW SIGNAL:  Normal marrow signal is identified within the visualized bony structures  No discrete marrow lesion      CERVICAL AND VISUALIZED UPPER THORACIC CORD:  Multiple foci of cord T2 signal abnormality involving right anterolateral medulla oblongata/cord at the craniocervical junction, posterior cord at level C1-2, bilateral anterolateral cord at C4, right cord at   C5, left cord at C7 and dorsal cord at C7-T1  There is no associated enhancement      PREVERTEBRAL AND PARASPINAL SOFT TISSUES:  Normal      VISUALIZED POSTERIOR FOSSA:  The visualized posterior fossa demonstrates no abnormal signal      CERVICAL DISC SPACES:     C2-C3:  There is no disc bulge    No canal or foraminal stenosis      C3-C4:  No disc bulge  No canal or foraminal stenosis      C4-C5:  There is disc bulge and superimposed right paracentral disc protrusion with marginal osteophytes  Bilateral uncovertebral spurring and mild facet arthropathy  There is mild canal stenosis  Mild bilateral foraminal narrowing      C5-C6:  Mild disc bulge superimposed small central disc protrusion  Bilateral uncovertebral spurring and mild facet arthropathy  Mild canal narrowing  There is mild bilateral foraminal narrowing      C6-C7:  Central disc protrusion resulting in mild canal narrowing  No foraminal stenosis      C7-T1:  Unremarkable      UPPER THORACIC DISC SPACES:  Normal      POSTCONTRAST IMAGING:  Normal      IMPRESSION:     1   Multifocal cervical spinal cord signal abnormality most consistent with demyelinating disease given findings in brain MRI  No active demyelination  No cord atrophy      2   Multilevel mild canal and foraminal narrowing more pronounced at level C4-5  MRI THORACIC SPINE WITH AND WITHOUT CONTRAST     INDICATION: Demyelinating lesions      COMPARISON:  None      TECHNIQUE:  Sagittal T1, sagittal T2, sagittal inversion recovery, axial T2,  axial 2D MERGE  Sagittal and axial T1 postcontrast      IV Contrast:  6 mL of Gadobutrol injection (SINGLE-DOSE)      IMAGE QUALITY:  Diagnostic      FINDINGS:     ALIGNMENT:  Normal alignment of the thoracic spine  No compression fracture  No subluxation  No evidence of scoliosis      MARROW SIGNAL:  Normal marrow signal is identified within the visualized bony structures  No discrete marrow lesion      THORACIC CORD:  Normal signal within the thoracic cord      PREVERTEBRAL AND PARASPINAL SOFT TISSUES:   Normal      THORACIC DEGENERATIVE CHANGE:  There is focus of increased cord T2 signal in the right posterior cord at inferior level C7    There is patchy cord T2 signal abnormality in the mid/anterior cord at level T8-9     POSTCONTRAST:  No abnormal enhancement      IMPRESSION:     Patchy cord signal abnormality at level T8-9 most consistent with demyelinating disease given findings in brain MRI  No active demyelination  No cord atrophy    Incidental Findings:   None    Test Results Pending at Discharge (will require follow up): · Serum MS panel     Outpatient Tests Requested:  · None    Complications:  None    Hospital Course:     Jeanne Owen is a 39 y o  female patient who originally presented to the hospital on 7/13/2021 due to paresthesia and pain of the right upper and lower extremity  Neurology was consulted  MRI brain, cervical spine and thoracic spine done  showed extensive white matter disease suggestive of advanced demyelinating disease of multiple sclerosis  MRI cervical spine showed multifocal abnormalities consistent with demyelinating disease  MRI thoracic spine shows patchy cord signal abnormality at the level of T8-T9 consistent with demyelinating disease  Interventional Radiology was consulted for lumbar puncture  CSF revealed elevated protein, and WBC  Negative microbiological studies  EEG recorded during an episode was negative for epileptiform abnormality  She has received 5 days of IV methylprednisolone 1000 mg daily  She is advised to follow-up report with MS specialist in the next 4 weeks    Condition at Discharge: stable     Discharge Day Visit / Exam:     Subjective:  Patient seen and examined at bedside  Reports having occasional jolts of paresthesia in her right  Upper and lower extremity  Patient verbalizes understanding that the current symptoms would not hector acutely       Vitals: Blood Pressure: 121/60 (07/18/21 0700)  Pulse: 55 (07/18/21 0700)  Temperature: 97 6 °F (36 4 °C) (07/18/21 0700)  Temp Source: Oral (07/17/21 1917)  Respirations: 16 (07/18/21 0700)  Height: 5' 1" (154 9 cm) (07/13/21 0835)  Weight - Scale: 62 1 kg (137 lb) (07/13/21 0835)  SpO2: 99 % (07/18/21 0700)  Exam:   Physical Exam  Constitutional:       General: She is not in acute distress  Appearance: She is normal weight  She is not toxic-appearing  HENT:      Head: Normocephalic and atraumatic  Eyes:      Extraocular Movements: Extraocular movements intact  Pupils: Pupils are equal, round, and reactive to light  Cardiovascular:      Rate and Rhythm: Regular rhythm  Tachycardia present  Pulses: Normal pulses  Heart sounds: Normal heart sounds  Pulmonary:      Effort: No respiratory distress  Breath sounds: Normal breath sounds  No wheezing  Abdominal:      General: Bowel sounds are normal  There is no distension  Palpations: Abdomen is soft  Tenderness: There is no abdominal tenderness  Musculoskeletal:      Right lower leg: No edema  Left lower leg: No edema  Skin:     General: Skin is dry  Coloration: Skin is not jaundiced  Neurological:      General: No focal deficit present  Mental Status: She is alert and oriented to person, place, and time  Mental status is at baseline  Psychiatric:         Mood and Affect: Mood normal          Behavior: Behavior normal        Discussion with Family:  Talked to Spouse  All questions/concerns were answered  He agrees with the plan  Discharge instructions/Information to patient and family:   See after visit summary for information provided to patient and family  Provisions for Follow-Up Care:  See after visit summary for information related to follow-up care and any pertinent home health orders  Planned Readmission: No     Discharge Medications:  See after visit summary for reconciled discharge medications provided to patient and family  Discharge Statement :  I spent 25 minutes discharging the patient  This time was spent on the day of discharge  I had direct contact with the patient on the day of discharge  Additional documentation is required if more than 30 minutes were spent on discharge           ** Please Note: This note has been constructed using a voice recognition system **

## 2021-07-18 NOTE — CASE MANAGEMENT
Pt requiring rw  Reports never receiving one thru ins  Cm sent parachute request  rw delivered from closet   No other needs reported

## 2021-07-18 NOTE — ASSESSMENT & PLAN NOTE
· Multiple sclerosis; diagnosed during this admission  · Presented with right-sided upper and lower extremity paresthesias and weakness   MRI: Extensive white matter disease suggesting advanced demyelinating disease of multiple sclerosis  Scattered supratentorial ring-enhancing lesions suggest active demyelination  · MRI of cervical and thoracic spine:  Multifocal cervical spinal cord signal abnormality, consistent with demyelinating disease  Patchy core signal abnormality at level T8-T9, consistent with demyelinating disease  No active demyelination  · LP performed - CSF contains elevated WBC, RBC, and protein  · EEG: recorded during an episode, no changes seen  Does not fully exclude the possibility of a focal aware seizure (simple partial seizure)    Possibly psychogenic seizure  · CSF culture and Gram stain negative  · Antibody for neuromyelitis optica negative  · Neurology following  · She received 5 dose of IV Solu-Medrol 1gm daily pulse treatment     Serum MS panel pending - follow up with MS specialist    F/U outpatient with MS attending in 4 weeks

## 2021-07-18 NOTE — PLAN OF CARE
Problem: NEUROSENSORY - ADULT  Goal: Achieves stable or improved neurological status  Description: INTERVENTIONS  - Monitor and report changes in neurological status  - Monitor vital signs such as temperature, blood pressure, glucose, and any other labs ordered   - Initiate measures to prevent increased intracranial pressure  - Monitor for seizure activity and implement precautions if appropriate      Outcome: Progressing  Goal: Achieves maximal functionality and self care  Description: INTERVENTIONS  - Monitor swallowing and airway patency with patient fatigue and changes in neurological status  - Encourage and assist patient to increase activity and self care     - Encourage visually impaired, hearing impaired and aphasic patients to use assistive/communication devices  Outcome: Progressing     Problem: PAIN - ADULT  Goal: Verbalizes/displays adequate comfort level or baseline comfort level  Description: Interventions:  - Encourage patient to monitor pain and request assistance  - Assess pain using appropriate pain scale  - Administer analgesics based on type and severity of pain and evaluate response  - Implement non-pharmacological measures as appropriate and evaluate response  - Consider cultural and social influences on pain and pain management  - Notify physician/advanced practitioner if interventions unsuccessful or patient reports new pain  Outcome: Progressing     Problem: DISCHARGE PLANNING  Goal: Discharge to home or other facility with appropriate resources  Description: INTERVENTIONS:  - Identify barriers to discharge w/patient and caregiver  - Arrange for needed discharge resources and transportation as appropriate  - Identify discharge learning needs (meds, wound care, etc )  - Arrange for interpretive services to assist at discharge as needed  - Refer to Case Management Department for coordinating discharge planning if the patient needs post-hospital services based on physician/advanced practitioner order or complex needs related to functional status, cognitive ability, or social support system  Outcome: Progressing     Problem: Knowledge Deficit  Goal: Patient/family/caregiver demonstrates understanding of disease process, treatment plan, medications, and discharge instructions  Description: Complete learning assessment and assess knowledge base    Interventions:  - Provide teaching at level of understanding  - Provide teaching via preferred learning methods  Outcome: Progressing     Problem: Potential for Falls  Goal: Patient will remain free of falls  Description: INTERVENTIONS:  - Educate patient/family on patient safety including physical limitations  - Instruct patient to call for assistance with activity   - Consult OT/PT to assist with strengthening/mobility   - Keep Call bell within reach  - Keep bed low and locked with side rails adjusted as appropriate  - Keep care items and personal belongings within reach  - Initiate and maintain comfort rounds  - Make Fall Risk Sign visible to staff  - Offer Toileting every  Hours, in advance of need  - Initiate/Maintain alarm  - Obtain necessary fall risk management equipment  - Apply yellow socks and bracelet for high fall risk patients  - Consider moving patient to room near nurses station  Outcome: Progressing

## 2021-07-18 NOTE — DISCHARGE INSTR - AVS FIRST PAGE
Dear Monica Freitas,     It was our pleasure to care for you here at [facility]  It is our hope that we were always able to exceed the expected standards for your care during your stay  You were hospitalized due to multiple sclerosis  You were cared for on the 2nd floor by Mell Navarrete MD under the service of Justin Castañeda MD with the [facility] who covers for your primary care physician (PCP), Lisseth Nguyen MD, while you were hospitalized  If you have any questions or concerns related to this hospitalization, you may contact us at [phone]  For follow up as well as any medication refills, we recommend that you follow up with your primary care physician  A registered nurse will reach out to you by phone within a few days after your discharge to answer any additional questions that you may have after going home  However, at this time we provide for you here, the most important instructions / recommendations at discharge:     · Notable Medication Adjustments -   · Added lidocaine patches   · Testing Required after Discharge -   · NA  · Important follow up information -   · Follow up with MS specialist  Referral in discharge packet  Neurology outpatient office should contact you in the next week to schedule appointment  If you have any issues, please contact the hospital   · Other Instructions -   · See above  · Please review this entire after visit summary as additional general instructions including medication list, appointments, activity, diet, any pertinent wound care, and other additional recommendations from your care team that may be provided for you        Sincerely,     Mell Navarrete MD

## 2021-07-19 LAB
ALB CSF/SERPL: 8 {RATIO} (ref 0–8)
ALBUMIN CSF-MCNC: 35 MG/DL (ref 7–29)
ALBUMIN SERPL-MCNC: 4.3 G/DL (ref 3.8–4.8)
IGG CSF-MCNC: 16.6 MG/DL (ref 0–6.7)
IGG SERPL-MCNC: 1425 MG/DL (ref 586–1602)
IGG SYNTH RATE SER+CSF CALC-MRATE: 52.1 MG/DAY
IGG/ALB CLEAR SER+CSF-RTO: 1.4 (ref 0–0.7)
IGG/ALB CSF: 0.47 {RATIO} (ref 0–0.25)
MBP CSF-MCNC: 4.4 NG/ML (ref 0–3.7)
OLIGOCLONAL BANDS.IT SER+CSF QL: ABNORMAL

## 2021-07-19 NOTE — UTILIZATION REVIEW
Notification of Discharge   This is a Notification of Discharge from our facility [address]  Please be advised that this patient has been discharge from our facility  Below you will find the admission and discharge date and time including the patients disposition  UTILIZATION REVIEW CONTACT:  Marianela Sol  Utilization   Network Utilization Review Department  Phone: [phone] x carefully listen to the prompts  All voicemails are confidential   Email: [email or web address]     PHYSICIAN ADVISORY SERVICES:  FOR [id number] REVIEW - MEDICAL NECESSITY DENIAL  Phone: [phone]  Fax: [phone]  Email: [email or web address]     PRESENTATION DATE: 7/13/2021  9:41 AM  OBERVATION ADMISSION DATE:   INPATIENT ADMISSION DATE: 7/14/21  6:51 PM   DISCHARGE DATE: 7/18/2021  3:15 PM  DISPOSITION: Home/Self Care Home/Self Care      IMPORTANT INFORMATION:  Send all requests for admission clinical reviews, approved or denied determinations and any other requests to dedicated fax number below belonging to the campus where the patient is receiving treatment   List of dedicated fax numbers:  [address] DENIALS (Administrative/Medical Necessity) [phone]   [address] (Maternity/NICU/Pediatrics) [phone]   Otila Reno [phone]   [facility] [phone]   Vicki Jokins [phone]   Mikaylavirgil [facility] [address] [facility] [phone]   [facility]  [phone]   [address] [facility] [address]  [address] [facility] [address] [facility] [phone]

## 2021-07-21 ENCOUNTER — TELEPHONE (OUTPATIENT)
Dept: NEUROLOGY | Facility: CLINIC | Age: 36
End: 2021-07-21

## 2021-07-21 NOTE — TELEPHONE ENCOUNTER
1st Attempt  Received call from Calos Maher patient's case manger to contact patient to schedule her hospital follow up  She stated patient condition was getting worse  Called patient and scheduled her for 08/05 with Dr Yaneth Valdes in the [facility] office  Patient was very grateful for appointment and would like to follow up at the [facility] office  SLMO/MS/Chris    Notes from chart:  Maira Fernandez will need follow up in in 4 weeks with multiple sclerosis attending  She will not require outpatient neurological testing

## 2021-07-23 LAB
25(OH)D2 SERPL-MCNC: 1.1 NG/ML
25(OH)D3 SERPL-MCNC: 20 NG/ML
25(OH)D3+25(OH)D2 SERPL-MCNC: 21 NG/ML

## 2021-08-05 ENCOUNTER — OFFICE VISIT (OUTPATIENT)
Dept: NEUROLOGY | Facility: CLINIC | Age: 36
End: 2021-08-05
Payer: COMMERCIAL

## 2021-08-05 VITALS
HEIGHT: 61 IN | DIASTOLIC BLOOD PRESSURE: 88 MMHG | SYSTOLIC BLOOD PRESSURE: 117 MMHG | WEIGHT: 137.6 LBS | HEART RATE: 92 BPM | BODY MASS INDEX: 25.98 KG/M2

## 2021-08-05 DIAGNOSIS — R20.2 NUMBNESS AND TINGLING OF RIGHT ARM AND LEG: ICD-10-CM

## 2021-08-05 DIAGNOSIS — R20.0 NUMBNESS AND TINGLING OF RIGHT ARM AND LEG: ICD-10-CM

## 2021-08-05 DIAGNOSIS — G35 MS (MULTIPLE SCLEROSIS) (HCC): Primary | ICD-10-CM

## 2021-08-05 PROCEDURE — 99215 OFFICE O/P EST HI 40 MIN: CPT | Performed by: PSYCHIATRY & NEUROLOGY

## 2021-08-05 RX ORDER — ERGOCALCIFEROL 1.25 MG/1
50000 CAPSULE ORAL WEEKLY
Qty: 12 CAPSULE | Refills: 0 | Status: SHIPPED | OUTPATIENT
Start: 2021-08-05 | End: 2022-03-10 | Stop reason: SDUPTHER

## 2021-08-05 RX ORDER — BACLOFEN 10 MG/1
10 TABLET ORAL 3 TIMES DAILY
Qty: 90 TABLET | Refills: 0 | Status: SHIPPED | OUTPATIENT
Start: 2021-08-05 | End: 2022-03-10 | Stop reason: SDUPTHER

## 2021-08-05 RX ORDER — GABAPENTIN 100 MG/1
100 CAPSULE ORAL 3 TIMES DAILY
Qty: 90 CAPSULE | Refills: 3 | Status: SHIPPED | OUTPATIENT
Start: 2021-08-05 | End: 2022-03-10 | Stop reason: SDUPTHER

## 2021-08-05 NOTE — PROGRESS NOTES
[facility] MULTIPLE SCLEROSIS CENTER  PATIENT:  Kelvin Mena  MRN:  [id number]  :  1985  DATE OF SERVICE:  2021    Assessment/Plan:           Problem List Items Addressed This Visit        Nervous and Auditory    MS (multiple sclerosis) ([facility] ) - Primary    Relevant Medications    ergocalciferol (VITAMIN D2) 50,000 units    gabapentin (NEURONTIN) 100 mg capsule    baclofen 10 mg tablet    Other Relevant Orders    STRATIFY JCV(TM) AB W/RFX TO INHIBITION ASSAY    Hepatitis C Ab W/Refl To HCV RNA, Qn, PCR    Hepatitis B surface antibody    Hepatitis B core antibody, total    Hepatitis B surface antigen    Quantiferon TB Gold Plus      Other Visit Diagnoses     Numbness and tingling of right arm and leg        Relevant Medications    gabapentin (NEURONTIN) 100 mg capsule    baclofen 10 mg tablet          Mrs Tamy Tony   Has presented to [address] [place] [address] [address] for evaluation  Patient is here with her daughter for recent hospitalization  Patient described intermittent sensory dysfunction involving  Right upper neck and shoulder,  With sensation radiates along her right upper and right lower extremities, which brought concern for partial seizures; Neurology team provided comprehensive evaluation with multiple imaging studies completed, EEG was done, extensive evaluation of spinal fluid was offered  we personally reviewed brain imaging and we discussed all serologic and CSF findings, with concern for CNS stimulation including multiple sclerosis was brought during this discussion:  - Tysbari vs Ocrevus  Will be our best consideration in the light of patient likely had developed this condition 10 years ago with some breakthrough been reported in 2018 and another progression noted in ;  Patient will have ALEXANDRU virus completed and if negative will proceed with Tysabri;  Risk and benefits of the medication has been extensively discussed, safety  Consideration has been brought to the attention;   We also discussed cytotoxic treatment with B-cell depleting treatment, concern is for immunocompromised state during the rapidly evolving pandemic COVID 23 outbreak, as patient has 8and 5year-old kids core not currently vaccinated as patient is also not vaccinated  Against COVID-19; We also discussed While the Covid-19 mRNA vaccine was not specifically studied in MS patients, it is probably safe for patients with MS, off or on disease modifying therapy, 25years of age and older  The benefits  of the Covid-19 vaccine outweigh the risks in this evolving pandemic  Please follow the link below to the [facility] MS Society's statement on the vaccine in MS patients:  Alex pierson  - serologic work up advised   - gabapentin and baclofen  Were offered for symptomatic treatment of underlying sensory and motor dysfunction involving right upper right lower extremities  -  Importance of starting yoga and mindfulness with healthy dietary changes has been emphasized  -  Patient was advised to consider joining support groups at [facility], we also ask our  to help the patient find the resources for emotional support  Patient is to follow within 2-3 months with [facility] multiple sclerosis [facility]    Subjective:  Right upper right lower extremity shooting pain with sensory dysfunction,  Intermittent, lasting 2 minutes    HPI    Mrs Nataliia Hardy is a 39 y o  female  who presented to [facility] multiple [address] for follow-up on her recent hospitalization which took place in July 2021  patient described having ride upper extremity and shoulder and forearm shooting pain which radiates to right lower extremity, pain comes for 2 minutes, and patient may experience up to 10 episodes a day    This pain was evaluated during her recent hospitalization with extensive demyelination was noted in brain and her spine  Patient agreed with that she had initial presentation in the form of severe migraine after she gave the birth 8year-old child, sensory dysfunction in her arms started at 2018,  As per the patient  Patient has known migraines and anemia who presents to [facility] 07/13/2021 with recurrent episodes of right sided numbness/tingling followed by stiffness  neurological evaluation was consistent with EEG, imaging studies and spinal tap:  CT head 07/13/2021:  ? No acute intracranial abnormalities  ? Few scattered foci of periventricular white matter low attenuation, may be secondary to microangiopathic changes, migraines, post infection/post inflammatory, or demyelinating disease  - MRI brain 07/13/2021:  ? Extensive white matter disease suggesting advanced demyelinating disease of multiple sclerosis  ? Scattered supratentorial ring enhancing lesions suggest active demyelination  - MRI C-spine:  ? Multifocal cervical spinal cord signal abnormalities most consistent with demyelinating disease  No active demyelination or cord atrophy noted  ? Multilevel mild canal and foraminal narrowing more pronounced at level C4-5  - MRI T-spine:  ? Patchy cord signal abnormality a level T8-9 most consistent with demyelinating disease  No active demyelination or cord atrophy noted  - S/p LP 07/14/2021:  ? Total protein elevated 72  ? WBC count elevated 28, lymphocyte predominant 88%  ? RBC count elevated 15  ? CSF results WNL: Glucose, Gram stain, culture    ? CSF MS panel is positive with 8 OCB and treasure IgG index;   ? Serum NMO negative    EEG: This Routine EEG recorded during wakefulness, drowsiness, and sleep captured one reported event from the patient, without clear clinical cahnges on video, but with patient still able to respond to EEG tech during the event, which was without EEG changes  The lack of EEG change does not fully exclude the possibility of a focal aware seizure (simple partial seizure)  Normal activities of wakefulness, drowsiness, and sleep were seen  The following portions of the patient's history were reviewed and updated as appropriate:   She  has no past medical history on file  She   Patient Active Problem List    Diagnosis Date Noted    Weakness     MS ([facility] sclerosis) ([facility] ) 2021    Patellofemoral syndrome, right 2021     She  has a past surgical history that includes  section and IR lumbar puncture (2021)  Her family history is not on file  She  reports that she has never smoked  She has never used smokeless tobacco  She reports that she does not drink alcohol and does not use drugs  Current Outpatient Medications   Medication Sig Dispense Refill    cholecalciferol (VITAMIN D3) 1,000 units tablet Take 1 tablet (1,000 Units total) by mouth daily 30 tablet 0    lidocaine (LIDODERM) 5 % Apply 1 patch topically daily as needed (R  Knee pain) Remove & Discard patch within 12 hours or as directed by MD 15 patch 0    meloxicam (MOBIC) 7 5 mg tablet Take 1 tablet (7 5 mg total) by mouth daily 30 tablet 1    Multiple Vitamins-Minerals (MULTI-B-PLUS PO) Take by mouth daily      baclofen 10 mg tablet Take 1 tablet (10 mg total) by mouth 3 (three) times a day 90 tablet 0    ergocalciferol (VITAMIN D2) 50,000 units Take 1 capsule (50,000 Units total) by mouth once a week 12 capsule 0    gabapentin (NEURONTIN) 100 mg capsule Take 1 capsule (100 mg total) by mouth 3 (three) times a day 90 capsule 3     No current facility-administered medications for this visit       Current Outpatient Medications on File Prior to Visit   Medication Sig    cholecalciferol (VITAMIN D3) 1,000 units tablet Take 1 tablet (1,000 Units total) by mouth daily    lidocaine (LIDODERM) 5 % Apply 1 patch topically daily as needed (R  Knee pain) Remove & Discard patch within 12 hours or as directed by MD    meloxicam (MOBIC) 7 5 mg tablet Take 1 tablet (7 5 mg total) by mouth daily    Multiple Vitamins-Minerals (MULTI-B-PLUS PO) Take by mouth daily     No current facility-administered medications on file prior to visit  She has No Known Allergies            Objective:    Blood pressure 117/88, pulse 92, height 5' 1" (1 549 m), weight 62 4 kg (137 lb 9 6 oz)  Physical Exam    Neurological Exam  CONSTITUTIONAL: NAD, pleasant  NECK: supple, no lymphadenopathy, no thyromegaly, no JVD  CARDIOVASCULAR: RRR, normal S1S2, no murmurs, no rubs  RESP: clear to auscultation bilaterally, no wheezes/rhonchi/rales  ABDOMEN: soft, non tender, non distended  SKIN: no rash or skin lesions  EXTREMITIES: no edema, pulses 2+bilaterally  PSYCH: appropriate mood and affect  NEUROLOGIC COMPREHENSIVE EXAM: Patient is oriented to person, place and time, NAD; appropriate affect  CN II, III, IV, V, VI, VII,VIII,IX,X,XI-XII intact with EOMI, PERRLA, OKN intact, VF grossly intact, fundi poorly visualized secondary to pupillary constriction; symmetric face noted  Motor: 5/5 UE/LE bilateral symmetric; Sensory: intact to light touch and pinprick bilaterally; normal vibration sensation feet bilaterally; Coordination within normal limits on FTN and JETT testing; DTR: 2/4 through, no Babinski, no clonus  Tandem gait is intact  Romberg: negative  ROS:  12 points of review of system was reviewed with the patient and was unremarkable with exception: see HPI  Review of Systems   Constitutional: Positive for fatigue  Negative for appetite change and fever  HENT: Negative  Negative for hearing loss, tinnitus, trouble swallowing and voice change  Eyes: Negative  Negative for photophobia and pain  Respiratory: Negative  Negative for shortness of breath  Cardiovascular: Negative  Negative for palpitations  Gastrointestinal: Negative  Negative for nausea and vomiting  Endocrine: Negative  Negative for cold intolerance  Genitourinary: Negative  Negative for dysuria, frequency and urgency     Musculoskeletal: Negative  Negative for myalgias and neck pain  Skin: Negative  Negative for rash  Neurological: Positive for dizziness, speech difficulty and weakness (righ arm)  Negative for tremors, seizures, syncope, facial asymmetry, light-headedness, numbness and headaches  Off balance   Hematological: Negative  Does not bruise/bleed easily  Psychiatric/Behavioral: Negative  Negative for confusion, hallucinations and sleep disturbance

## 2021-08-06 ENCOUNTER — TELEPHONE (OUTPATIENT)
Dept: NEUROLOGY | Facility: CLINIC | Age: 36
End: 2021-08-06

## 2021-08-06 NOTE — TELEPHONE ENCOUNTER
Patient is newly diagnosed with CNS demyelination/MS - patient was asking for resources where she can find support groups; NMSS website was advised as a starting point    Please follow with the patient

## 2021-08-10 NOTE — TELEPHONE ENCOUNTER
SHAYLA called the patient to discuss support group options in her area but she did not answer the phone  SHAYLA was unable to leave her a VM because her mailbox was full

## 2021-08-11 NOTE — TELEPHONE ENCOUNTER
MSW spoke with the patient  She would be interested in learning about support groups that are held both in person and virtually  MSW will send her information about the support the [address]  has to offer along with any other local support groups that MSW is able to find

## 2021-08-12 NOTE — TELEPHONE ENCOUNTER
MS mailed virtual support group options that the national MS society has to offer as well as Radha Solders since they offer virtual options as well  Their are no further social needs at this time  MSW will be available to assist with any future needs in regard to this patient

## 2021-08-14 DIAGNOSIS — M25.561 ACUTE PAIN OF RIGHT KNEE: ICD-10-CM

## 2021-08-14 DIAGNOSIS — M22.2X1 PATELLOFEMORAL SYNDROME, RIGHT: ICD-10-CM

## 2021-08-15 RX ORDER — MELOXICAM 7.5 MG/1
TABLET ORAL
Qty: 30 TABLET | Refills: 0 | Status: SHIPPED | OUTPATIENT
Start: 2021-08-15

## 2021-10-22 LAB
DME PARACHUTE DELIVERY DATE ACTUAL: NORMAL
DME PARACHUTE DELIVERY DATE REQUESTED: NORMAL
DME PARACHUTE ITEM DESCRIPTION: NORMAL
DME PARACHUTE ORDER STATUS: NORMAL
DME PARACHUTE SUPPLIER NAME: NORMAL
DME PARACHUTE SUPPLIER PHONE: NORMAL

## 2021-12-15 ENCOUNTER — TELEPHONE (OUTPATIENT)
Dept: NEUROLOGY | Facility: CLINIC | Age: 36
End: 2021-12-15

## 2022-03-10 DIAGNOSIS — R20.0 NUMBNESS AND TINGLING OF RIGHT ARM AND LEG: ICD-10-CM

## 2022-03-10 DIAGNOSIS — G35 MS (MULTIPLE SCLEROSIS) (HCC): ICD-10-CM

## 2022-03-10 DIAGNOSIS — R20.2 NUMBNESS AND TINGLING OF RIGHT ARM AND LEG: ICD-10-CM

## 2022-03-11 NOTE — TELEPHONE ENCOUNTER
Patient requesting refill of Gabapentin 100mg - 1 cap TID      LOV - 8/2021  F/U- patient was a "no show" 12/1/2021 and 12/17/21    Script pended below  Dr Lucio Fox - please sign if agreeable

## 2022-03-13 RX ORDER — GABAPENTIN 100 MG/1
100 CAPSULE ORAL 3 TIMES DAILY
Qty: 90 CAPSULE | Refills: 0 | Status: SHIPPED | OUTPATIENT
Start: 2022-03-13

## 2022-03-13 RX ORDER — BACLOFEN 10 MG/1
10 TABLET ORAL 3 TIMES DAILY
Qty: 90 TABLET | Refills: 0 | Status: SHIPPED | OUTPATIENT
Start: 2022-03-13

## 2022-03-14 RX ORDER — ERGOCALCIFEROL 1.25 MG/1
50000 CAPSULE ORAL WEEKLY
Qty: 12 CAPSULE | Refills: 0 | Status: SHIPPED | OUTPATIENT
Start: 2022-03-14

## 2022-03-14 NOTE — TELEPHONE ENCOUNTER
Please review for approval      Ergocalciferol 50,000 units filled 8/5/21-12 capsules - take weekly with no refills     Seen in the office on 3/10/22  Follow up not yet scheduled

## 2022-03-15 ENCOUNTER — TELEPHONE (OUTPATIENT)
Dept: NEUROLOGY | Facility: CLINIC | Age: 37
End: 2022-03-15

## 2022-03-15 NOTE — TELEPHONE ENCOUNTER
Called Jamse Felty to schedule her a follow up appointment with Dr Adrián Apodaca  She said she prefers mornings so I scheduled her on 4/25/22 at 11 am with Dr Adrián Apodaca at the [facility]

## 2022-04-19 ENCOUNTER — TELEPHONE (OUTPATIENT)
Dept: NEUROLOGY | Facility: CLINIC | Age: 37
End: 2022-04-19

## 2022-04-19 NOTE — TELEPHONE ENCOUNTER
Reminder appt call     Patient is scheduled on 4/25/2022 @ 11 am with an arrival time  1045 am in the [facility] location with Dr Paramjit Sy  Patient confirmed appt

## 2022-04-25 ENCOUNTER — TELEMEDICINE (OUTPATIENT)
Dept: NEUROLOGY | Facility: CLINIC | Age: 37
End: 2022-04-25
Payer: COMMERCIAL

## 2022-04-25 ENCOUNTER — TELEPHONE (OUTPATIENT)
Dept: NEUROLOGY | Facility: CLINIC | Age: 37
End: 2022-04-25

## 2022-04-25 VITALS — WEIGHT: 137.9 LBS | BODY MASS INDEX: 26.04 KG/M2 | HEIGHT: 61 IN

## 2022-04-25 DIAGNOSIS — R53.1 WEAKNESS: ICD-10-CM

## 2022-04-25 DIAGNOSIS — G35 MULTIPLE SCLEROSIS (HCC): Primary | ICD-10-CM

## 2022-04-25 PROCEDURE — 99215 OFFICE O/P EST HI 40 MIN: CPT | Performed by: PSYCHIATRY & NEUROLOGY

## 2022-04-25 RX ORDER — ESCITALOPRAM OXALATE 10 MG/1
10 TABLET ORAL DAILY
COMMUNITY
Start: 2021-09-13 | End: 2022-09-13

## 2022-04-25 NOTE — TELEPHONE ENCOUNTER
Please help with 3 doses of Solumedrol 1000 mg IV in [facility]  No sleeping aid advised  Preferable time is next week    Patient is not on DMT - discussed Ocrevus; FU visit required with Archana
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
## 2022-04-25 NOTE — TELEPHONE ENCOUNTER
Pt has [facility] family  No PA is needed for , [id number], and [id number] per website  Therapy plan entered and sent for signature  TT sent  Referral updated  Pt has f/u scheduled with Archana on 5/24/22

## 2022-04-25 NOTE — TELEPHONE ENCOUNTER
Called [facility] and spoke with [facility]  Pt is scheduled for the following:    Monday 5/2/22 @ 2:30pm  Tuesday 5/3/22 @ 10am  Wednesday 5/4/22 @ 2:30pm    Pt made aware, she is agreeable

## 2022-05-02 ENCOUNTER — HOSPITAL ENCOUNTER (OUTPATIENT)
Dept: INFUSION CENTER | Facility: CLINIC | Age: 37
Discharge: HOME | End: 2022-05-02

## 2022-05-03 ENCOUNTER — HOSPITAL ENCOUNTER (OUTPATIENT)
Dept: INFUSION CENTER | Facility: CLINIC | Age: 37
End: 2022-05-03

## 2022-05-04 ENCOUNTER — HOSPITAL ENCOUNTER (OUTPATIENT)
Dept: INFUSION CENTER | Facility: CLINIC | Age: 37
End: 2022-05-04

## 2022-05-09 ENCOUNTER — HOSPITAL ENCOUNTER (OUTPATIENT)
Dept: INFUSION CENTER | Facility: CLINIC | Age: 37
Discharge: HOME | End: 2022-05-09
Payer: COMMERCIAL

## 2022-05-09 VITALS
SYSTOLIC BLOOD PRESSURE: 110 MMHG | BODY MASS INDEX: 25.39 KG/M2 | WEIGHT: 134.4 LBS | HEART RATE: 68 BPM | RESPIRATION RATE: 17 BRPM | TEMPERATURE: 98.2 F | DIASTOLIC BLOOD PRESSURE: 60 MMHG

## 2022-05-09 DIAGNOSIS — G35 MULTIPLE SCLEROSIS (HCC): Primary | ICD-10-CM

## 2022-05-09 PROCEDURE — 96365 THER/PROPH/DIAG IV INF INIT: CPT

## 2022-05-09 RX ORDER — SODIUM CHLORIDE 9 MG/ML
20 INJECTION, SOLUTION INTRAVENOUS ONCE
Status: CANCELLED | OUTPATIENT
Start: 2022-05-10

## 2022-05-09 RX ORDER — SODIUM CHLORIDE 9 MG/ML
20 INJECTION, SOLUTION INTRAVENOUS ONCE
Status: COMPLETED | OUTPATIENT
Start: 2022-05-09 | End: 2022-05-09

## 2022-05-09 RX ADMIN — SODIUM CHLORIDE 1000 MG: 0.9 INJECTION, SOLUTION INTRAVENOUS at 15:09

## 2022-05-09 RX ADMIN — SODIUM CHLORIDE 20 ML/HR: 0.9 INJECTION, SOLUTION INTRAVENOUS at 15:09

## 2022-05-10 ENCOUNTER — HOSPITAL ENCOUNTER (OUTPATIENT)
Dept: INFUSION CENTER | Facility: CLINIC | Age: 37
Discharge: HOME | End: 2022-05-10
Payer: COMMERCIAL

## 2022-05-10 VITALS
TEMPERATURE: 97.6 F | DIASTOLIC BLOOD PRESSURE: 58 MMHG | RESPIRATION RATE: 16 BRPM | HEART RATE: 72 BPM | SYSTOLIC BLOOD PRESSURE: 117 MMHG

## 2022-05-10 DIAGNOSIS — G35 MULTIPLE SCLEROSIS (HCC): Primary | ICD-10-CM

## 2022-05-10 PROCEDURE — 96365 THER/PROPH/DIAG IV INF INIT: CPT

## 2022-05-10 RX ORDER — SODIUM CHLORIDE 9 MG/ML
20 INJECTION, SOLUTION INTRAVENOUS ONCE
Status: COMPLETED | OUTPATIENT
Start: 2022-05-10 | End: 2022-05-10

## 2022-05-10 RX ORDER — SODIUM CHLORIDE 9 MG/ML
20 INJECTION, SOLUTION INTRAVENOUS ONCE
Status: CANCELLED | OUTPATIENT
Start: 2022-05-11

## 2022-05-10 RX ADMIN — SODIUM CHLORIDE 1000 MG: 0.9 INJECTION, SOLUTION INTRAVENOUS at 14:19

## 2022-05-10 RX ADMIN — SODIUM CHLORIDE 20 ML/HR: 0.9 INJECTION, SOLUTION INTRAVENOUS at 14:19

## 2022-05-10 NOTE — PROGRESS NOTES
PT here for solu-medrol infusion, offering no complaints  Right AC IV placed with positive blood return noted  Tolerated infusion without incident  PIV removed  AVS given  Walked out in stable condition

## 2022-05-11 ENCOUNTER — HOSPITAL ENCOUNTER (OUTPATIENT)
Dept: INFUSION CENTER | Facility: CLINIC | Age: 37
Discharge: HOME | End: 2022-05-11
Payer: COMMERCIAL

## 2022-05-11 VITALS
DIASTOLIC BLOOD PRESSURE: 86 MMHG | RESPIRATION RATE: 16 BRPM | SYSTOLIC BLOOD PRESSURE: 123 MMHG | HEART RATE: 76 BPM | TEMPERATURE: 98.3 F

## 2022-05-11 DIAGNOSIS — G35 MULTIPLE SCLEROSIS (HCC): Primary | ICD-10-CM

## 2022-05-11 PROCEDURE — 96365 THER/PROPH/DIAG IV INF INIT: CPT

## 2022-05-11 RX ORDER — SODIUM CHLORIDE 9 MG/ML
20 INJECTION, SOLUTION INTRAVENOUS ONCE
Status: CANCELLED | OUTPATIENT
Start: 2022-05-11

## 2022-05-11 RX ORDER — SODIUM CHLORIDE 9 MG/ML
20 INJECTION, SOLUTION INTRAVENOUS ONCE
Status: COMPLETED | OUTPATIENT
Start: 2022-05-11 | End: 2022-05-11

## 2022-05-11 RX ADMIN — METHYLPREDNISOLONE SODIUM SUCCINATE 1000 MG: 1 INJECTION, POWDER, LYOPHILIZED, FOR SOLUTION INTRAMUSCULAR; INTRAVENOUS at 14:53

## 2022-05-11 RX ADMIN — SODIUM CHLORIDE 20 ML/HR: 0.9 INJECTION, SOLUTION INTRAVENOUS at 14:53

## 2022-05-11 NOTE — PROGRESS NOTES
PT here for day 3 of solumedrol infusion, offering no complaints  Left AC IV placed with positive blood return noted  Tolerated infusion without incident  PIV removed  AVS declined  Walked out in stable condition

## 2022-05-24 ENCOUNTER — OFFICE VISIT (OUTPATIENT)
Dept: NEUROLOGY | Facility: CLINIC | Age: 37
End: 2022-05-24
Payer: COMMERCIAL

## 2022-05-24 VITALS
DIASTOLIC BLOOD PRESSURE: 80 MMHG | HEART RATE: 76 BPM | WEIGHT: 133 LBS | BODY MASS INDEX: 25.11 KG/M2 | HEIGHT: 61 IN | SYSTOLIC BLOOD PRESSURE: 110 MMHG

## 2022-05-24 DIAGNOSIS — G35 MS (MULTIPLE SCLEROSIS) (HCC): ICD-10-CM

## 2022-05-24 PROCEDURE — 99214 OFFICE O/P EST MOD 30 MIN: CPT

## 2022-05-24 NOTE — ASSESSMENT & PLAN NOTE
Katlin Garrett is a 28-year-old female diagnosed with multiple sclerosis in July 2021  She is not currently on disease modifying therapy  We did discuss the role of disease modifying therapy in preventing new MS lesions that result in progressive disability  We discussed both Tysabri and Ocrevus in extensive detail today  She has decided to pursue Ocrevus at this time  In regard to [address] [place] [address] we specifically discussed how the medication works in the body, how it is administered via IV with premedication beforehand, frequency of treatments, required serum workup beforehand (reordered today), the potential for infusion hypersensitivity reactions, the potential for malignancy including breast cancer, common adverse effects such as upper respiratory infections, the rare potential for PML, and required routine lab monitoring  She understands that she should not become pregnant or breastfeed while on this medication  In terms of vaccinations she understands that she should not receive a live vaccine within 4 weeks of Ocrevus or any other vaccine (unattenuated) within 2 weeks of Ocrevus  The patient and I had a lengthy conversation about COVID-19 infection in patients with multiple sclerosis  I did encourage her to be vaccinated as soon as possible  At the conclusion of our conversation she decided that she would proceed with pursuing covid-19 vaccination as soon as possible  We discussed that I would have her sign the Ocrevus start form today, I will complete my section and send it in so that we may start a benefit investigation  However, she understands that we will not schedule her for her initial infusion until her serum workup is back and we confirm there is no presence of infection with TB, hepatitis-B or C, or any other contraindication  She will follow-up in 3 months

## 2022-05-24 NOTE — PATIENT INSTRUCTIONS
- Complete lab work  - Go get Covid-19 vaccine :)  - Will schedule Ocrevus as soon as able to  - Follow up in 3 months; sooner if needed

## 2022-05-24 NOTE — PROGRESS NOTES
Patient ID: Harmony Craft is a 44 y o  female  Assessment/Plan:    MS (multiple sclerosis) ([facility] )  Harmony Craft is a 74-year-old female diagnosed with multiple sclerosis in July 2021  She is not currently on disease modifying therapy  We did discuss the role of disease modifying therapy in preventing new MS lesions that result in progressive disability  We discussed both Tysabri and Ocrevus in extensive detail today  She has decided to pursue Ocrevus at this time  In regard to [address] [place] [address] we specifically discussed how the medication works in the body, how it is administered via IV with premedication beforehand, frequency of treatments, required serum workup beforehand (reordered today), the potential for infusion hypersensitivity reactions, the potential for malignancy including breast cancer, common adverse effects such as upper respiratory infections, the rare potential for PML, and required routine lab monitoring  She understands that she should not become pregnant or breastfeed while on this medication  In terms of vaccinations she understands that she should not receive a live vaccine within 4 weeks of Ocrevus or any other vaccine (unattenuated) within 2 weeks of Ocrevus  The patient and I had a lengthy conversation about COVID-19 infection in patients with multiple sclerosis  I did encourage her to be vaccinated as soon as possible  At the conclusion of our conversation she decided that she would proceed with pursuing covid-19 vaccination as soon as possible  We discussed that I would have her sign the Ocrevus start form today, I will complete my section and send it in so that we may start a benefit investigation  However, she understands that we will not schedule her for her initial infusion until her serum workup is back and we confirm there is no presence of infection with TB, hepatitis-B or C, or any other contraindication  She will follow-up in 3 months         Diagnoses and all orders for this visit:    MS (multiple sclerosis) ([facility] )  -     Quantiferon TB Gold Plus; Future  -     Hepatitis B surface antigen; Future  -     Hepatitis B core antibody, total; Future  -     Hepatitis B surface antibody; Future  -     Hepatitis C Ab W/Refl To HCV RNA, Qn, PCR; Future  -     STRATIFY JCV(TM) AB W/RFX TO INHIBITION ASSAY; Future  -     IgE; Future  -     IgG, IgA, IgM; Future  -     CBC and differential; Future  -     Comprehensive metabolic panel; Future           Subjective:    HPI Zeb Tee is a 44year old female who is known to the practice for multiple sclerosis and related concerns  She was last seen by Dr Catalina Graf virtually 4/25/22  She did recently complete 3 days of  Solu-Medrol 1000 mg IV due to multifocal pain/neuropathic pain thought to be caused by her multiple sclerosis spinal cord involvement  She was also started on gabapentin and baclofen 3 times daily  She has noticed some improvement with the initiation of these medications  She is not currently maintained on any disease modifying therapy, however has returned today to discuss this further  She denies any history of hepatitis-B or C  She has no family history or personal history of breast cancer her medullary thyroid cancer  She does have 1 uncle with stage IV stomach cancer  She has 4 children the youngest being 8, and has an IUD in place for birth control  She plans no future breast-feeding or pregnancy  She is not currently vaccinated against COVID-19; however is considering it  She did not complete her prior serum workup ordered in August 2021  Review of prior MRI imaging and CSF Work up:    MRI Brain 7/13/21  Extensive white matter disease suggesting advanced demyelinating disease of multiple sclerosis  Scattered supratentorial ring-enhancing lesions suggest active demyelination  Serologic correlation is recommended  MRI C-Spine 7/14/2021  1    Multifocal cervical spinal cord signal abnormality most consistent with demyelinating disease given findings in brain MRI  No active demyelination  No cord atrophy  2   Multilevel mild canal and foraminal narrowing more pronounced at level C4-5  MRI T-spine 7/14/2021  Patchy cord signal abnormality at level T8-9 most consistent with demyelinating disease given findings in brain MRI  No active demyelination  No cord atrophy    MS Panel 7/14/21  Component      Latest Ref Rng & Units 7/14/2021   IgG, CSF      0 0 - 6 7 mg/dL 16 6 (H)   CSF ALBUMIN      7 - 29 mg/dL 35 (H)   Albumin      3 8 - 4 8 g/dL 4 3   CSF/SERUM ALBUMIN ALB  INDEX      0 - 8 8   IGG/ALBUMIN RATIO      0 00 - 0 25 0 47 (H)   IgG Index, CSF      0 0 - 0 7 1 4 (H)   IgG Synthetic Rate      -9 9 TO +3 3 mg/day 52 1 (H)   MYELIN BASIC PROTEIN CSF      0 0 - 3 7 ng/mL 4 4 (H)   OLIGOCLONAL BANDS       Eight (8) oligoclonal bands were observed in the CSF, which were not   detected in the serum sample  TOTAL IGG      586 - 1602 mg/dL 1425     She denies any new or concerning neurologic complaints today  The following portions of the patient's history were reviewed and updated as appropriate: allergies, current medications, past family history, past medical history, past social history and past surgical history  Objective:    Blood pressure 110/80, pulse 76, height 5' 1" (1 549 m), weight 60 3 kg (133 lb)  Neurological Exam    On neurological examination patient is alert, awake, oriented and in no distress  Speech is fluent without dysarthria or aphasia  Cranial nerves 2-12 were symmetrically intact bilaterally  She is able to rise easily without assistance from a seated position  Casual gait is normal including stance, stride, and arm swing  ROS:    Review of Systems   Constitutional: Negative  Negative for appetite change and fever  HENT: Negative  Negative for hearing loss, tinnitus, trouble swallowing and voice change  Eyes: Negative  Negative for photophobia and pain  Respiratory: Negative  Negative for shortness of breath  Cardiovascular: Negative  Negative for palpitations  Gastrointestinal: Negative  Negative for nausea and vomiting  Endocrine: Negative  Negative for cold intolerance  Genitourinary: Negative  Negative for dysuria, frequency and urgency  Musculoskeletal: Positive for myalgias  Negative for neck pain  Patient states a "cramping" in the right hand   Skin: Negative  Negative for rash  Neurological: Positive for weakness  Negative for dizziness, tremors, seizures, syncope, facial asymmetry, speech difficulty, light-headedness, numbness and headaches  Patient states generalized weakness    Hematological: Negative  Does not bruise/bleed easily  Psychiatric/Behavioral: Negative  Negative for confusion, hallucinations and sleep disturbance  Reviewed ROS as entered by medical assistant

## 2022-06-15 ENCOUNTER — HOSPITAL ENCOUNTER (EMERGENCY)
Facility: HOSPITAL | Age: 37
Discharge: HOME | End: 2022-06-15
Attending: EMERGENCY MEDICINE
Payer: COMMERCIAL

## 2022-06-15 VITALS
DIASTOLIC BLOOD PRESSURE: 62 MMHG | WEIGHT: 130 LBS | RESPIRATION RATE: 16 BRPM | SYSTOLIC BLOOD PRESSURE: 112 MMHG | BODY MASS INDEX: 24.55 KG/M2 | TEMPERATURE: 98.1 F | HEART RATE: 68 BPM | OXYGEN SATURATION: 98 % | HEIGHT: 61 IN

## 2022-06-15 DIAGNOSIS — H57.11 EYE PAIN, RIGHT: Primary | ICD-10-CM

## 2022-06-15 PROCEDURE — 99284 EMERGENCY DEPT VISIT MOD MDM: CPT | Performed by: EMERGENCY MEDICINE

## 2022-06-15 PROCEDURE — 99283 EMERGENCY DEPT VISIT LOW MDM: CPT

## 2022-06-15 RX ORDER — TETRACAINE HYDROCHLORIDE 5 MG/ML
1 SOLUTION OPHTHALMIC ONCE
Status: COMPLETED | OUTPATIENT
Start: 2022-06-15 | End: 2022-06-15

## 2022-06-15 RX ADMIN — TETRACAINE HYDROCHLORIDE 1 DROP: 5 SOLUTION OPHTHALMIC at 21:18

## 2022-06-15 RX ADMIN — FLUORESCEIN SODIUM 1 STRIP: 1 STRIP OPHTHALMIC at 21:18

## 2022-06-16 NOTE — ED PROVIDER NOTES
History  Chief Complaint   Patient presents with    Eye Pain     Patient c/o right eye pain that started yesterday  Patient unsure if something in her right eye  Patient also c/o headache that started yesterday  morning after the eye pain  Patient states"eye pain is worst than headache"  50-year-old female presenting emergency department for evaluation of eye pain  Patient complains of right eye pain this started yesterday, patient is not sure if she was struck by something or got something in her eye, she has had a little bit of burning and aching pain in the eye, also a pressure sensation behind the eye which has now generalized to a right-sided aching headache  To me she states that the headache has now mostly resolved but she still has some pain in her eye  She does endorse some mild decreased visual acuity mostly in the lower half of her visual field  Does not describe any flashes of light, darkness, or curtain closing like visual change  Prior to Admission Medications   Prescriptions Last Dose Informant Patient Reported? Taking?    Multiple Vitamins-Minerals (MULTI-B-PLUS PO)   Yes No   Sig: Take by mouth daily   Patient not taking: Reported on 5/24/2022   baclofen 10 mg tablet   No No   Sig: Take 1 tablet (10 mg total) by mouth 3 (three) times a day   cholecalciferol (VITAMIN D3) 1,000 units tablet   No No   Sig: Take 1 tablet (1,000 Units total) by mouth daily   ergocalciferol (VITAMIN D2) 50,000 units   No No   Sig: Take 1 capsule (50,000 Units total) by mouth once a week   escitalopram (LEXAPRO) 10 mg tablet   Yes No   Sig: Take 10 mg by mouth daily   gabapentin (NEURONTIN) 100 mg capsule   No No   Sig: Take 1 capsule (100 mg total) by mouth 3 (three) times a day   lidocaine (LIDODERM) 5 %   No No   Sig: Apply 1 patch topically daily as needed (R  Knee pain) Remove & Discard patch within 12 hours or as directed by MD   meloxicam (MOBIC) 7 5 mg tablet   No No   Sig: Take 1 tablet by mouth once daily   Patient not taking: Reported on 2022      Facility-Administered Medications: None       Past Medical History:   Diagnosis Date    Multiple sclerosis [facility])        Past Surgical History:   Procedure Laterality Date     SECTION      IR LUMBAR PUNCTURE  2021       History reviewed  No pertinent family history  I have reviewed and agree with the history as documented  E-Cigarette/Vaping    E-Cigarette Use Never User      E-Cigarette/Vaping Substances    Nicotine No     THC Yes     CBD No     Flavoring No      Social History     Tobacco Use    Smoking status: Never Smoker    Smokeless tobacco: Never Used   Vaping Use    Vaping Use: Never used   Substance Use Topics    Alcohol use: Never    Drug use: Never       Review of Systems   Constitutional: Negative for appetite change, chills, fatigue and fever  HENT: Negative for sneezing and sore throat  Eyes: Positive for pain  Negative for visual disturbance  Respiratory: Negative for cough, choking, chest tightness, shortness of breath and wheezing  Cardiovascular: Negative for chest pain and palpitations  Gastrointestinal: Negative for abdominal pain, constipation, diarrhea, nausea and vomiting  Genitourinary: Negative for difficulty urinating and dysuria  Neurological: Negative for dizziness, weakness, light-headedness, numbness and headaches  All other systems reviewed and are negative  Physical Exam  Physical Exam  Vitals and nursing note reviewed  Constitutional:       General: She is not in acute distress  Appearance: She is well-developed  She is not diaphoretic  HENT:      Head: Normocephalic and atraumatic  Eyes:      General: Lids are normal  Lids are everted, no foreign bodies appreciated  Vision grossly intact  Right eye: No foreign body  Left eye: No foreign body  Intraocular pressure: Right eye pressure is 13 mmHg        Extraocular Movements: Extraocular movements intact  Pupils: Pupils are equal, round, and reactive to light  Right eye: No fluorescein uptake  Left eye: No fluorescein uptake  Funduscopic exam:     Right eye: Red reflex present  Left eye: Red reflex present  Neck:      Vascular: No JVD  Trachea: No tracheal deviation  Cardiovascular:      Rate and Rhythm: Normal rate and regular rhythm  Heart sounds: Normal heart sounds  No murmur heard  No friction rub  No gallop  Pulmonary:      Effort: Pulmonary effort is normal  No respiratory distress  Breath sounds: Normal breath sounds  No wheezing or rales  Abdominal:      General: Bowel sounds are normal  There is no distension  Palpations: Abdomen is soft  Tenderness: There is no abdominal tenderness  There is no guarding or rebound  Skin:     General: Skin is warm and dry  Coloration: Skin is not pale  Neurological:      Mental Status: She is alert and oriented to person, place, and time  Cranial Nerves: No cranial nerve deficit  Motor: No abnormal muscle tone     Psychiatric:         Behavior: Behavior normal          Vital Signs  ED Triage Vitals [06/15/22 2106]   Temperature Pulse Respirations Blood Pressure SpO2   98 1 °F (36 7 °C) 68 16 112/62 98 %      Temp Source Heart Rate Source Patient Position - Orthostatic VS BP Location FiO2 (%)   Oral Monitor Sitting Left arm --      Pain Score       7           Vitals:    06/15/22 2106   BP: 112/62   Pulse: 68   Patient Position - Orthostatic VS: Sitting         Visual Acuity      ED Medications  Medications   fluorescein sodium sterile ophthalmic strip 1 strip (1 strip Right Eye Given 6/15/22 2118)   tetracaine 0 5 % ophthalmic solution 1 drop (1 drop Right Eye Given by Other 6/15/22 2118)       Diagnostic Studies  Results Reviewed     None                 No orders to display              Procedures  Procedures         ED Course MDM  Number of Diagnoses or Management Options  Eye pain, right  Diagnosis management comments: 25-year-old female with eye pain  No evidence of fluorescein uptake, administration of topical anesthetic did somewhat help alleviate the pain though, normal IOP  Vision grossly intact  Patient may have possible vitreous detachment verses ocular migraine, recommend she follow up promptly with Ophthalmology in the morning,      Disposition  Final diagnoses:   Eye pain, right     Time reflects when diagnosis was documented in both MDM as applicable and the Disposition within this note     Time User Action Codes Description Comment    6/15/2022  9:52 PM Arleth Villavicencio Add [H57 11] Eye pain, right       ED Disposition     ED Disposition   Discharge    Condition   Stable    Date/Time   Wed Fernando 15, 2022  9:52 PM    Comment   Katt Hu discharge to home/self care                 Follow-up Information     Follow up With Specialties Details Why [address] Ophthalmology Call   [address]  [phone]            Discharge Medication List as of 6/15/2022  9:52 PM      CONTINUE these medications which have NOT CHANGED    Details   baclofen 10 mg tablet Take 1 tablet (10 mg total) by mouth 3 (three) times a day, Starting Sun 3/13/2022, Normal      cholecalciferol (VITAMIN D3) 1,000 units tablet Take 1 tablet (1,000 Units total) by mouth daily, Starting Sun 7/18/2021, Until Tue 5/24/2022, Normal      ergocalciferol (VITAMIN D2) 50,000 units Take 1 capsule (50,000 Units total) by mouth once a week, Starting Mon 3/14/2022, Normal      escitalopram (LEXAPRO) 10 mg tablet Take 10 mg by mouth daily, Starting Mon 9/13/2021, Until Tue 9/13/2022, Historical Med      gabapentin (NEURONTIN) 100 mg capsule Take 1 capsule (100 mg total) by mouth 3 (three) times a day, Starting Sun 3/13/2022, Normal      lidocaine (LIDODERM) 5 % Apply 1 patch topically daily as needed (R  Knee pain) Remove & Discard patch within 12 hours or as directed by MD, Starting Sun 7/18/2021, Until Tue 5/24/2022 at 2359, Normal      meloxicam (MOBIC) 7 5 mg tablet Take 1 tablet by mouth once daily, Normal      Multiple Vitamins-Minerals (MULTI-B-PLUS PO) Take by mouth daily, Historical Med             No discharge procedures on file      PDMP Review     None          ED Provider  Electronically Signed by           Cee Trejo MD  06/16/22 8934

## 2022-06-18 ENCOUNTER — TELEPHONE (OUTPATIENT)
Dept: NEUROLOGY | Facility: CLINIC | Age: 37
End: 2022-06-18

## 2022-06-18 NOTE — TELEPHONE ENCOUNTER
Received fax from [facility]  PA is required for Ocrevus  Per chart review, Robyn Camarillo discussed at 5/24/22 LOV  Start form completed and submitted  Lab orders in chart  Not yet completed       Connie CARVALHO

## 2022-06-21 ENCOUNTER — HOSPITAL ENCOUNTER (OUTPATIENT)
Dept: INFUSION CENTER | Facility: CLINIC | Age: 37
Discharge: HOME | End: 2022-06-21
Payer: COMMERCIAL

## 2022-06-21 ENCOUNTER — TELEPHONE (OUTPATIENT)
Dept: NEUROLOGY | Facility: CLINIC | Age: 37
End: 2022-06-21

## 2022-06-21 VITALS
TEMPERATURE: 98.1 F | RESPIRATION RATE: 16 BRPM | HEART RATE: 83 BPM | SYSTOLIC BLOOD PRESSURE: 128 MMHG | DIASTOLIC BLOOD PRESSURE: 74 MMHG

## 2022-06-21 DIAGNOSIS — G35 MULTIPLE SCLEROSIS (HCC): Primary | ICD-10-CM

## 2022-06-21 PROCEDURE — 96365 THER/PROPH/DIAG IV INF INIT: CPT

## 2022-06-21 RX ORDER — SODIUM CHLORIDE 9 MG/ML
20 INJECTION, SOLUTION INTRAVENOUS ONCE
Status: COMPLETED | OUTPATIENT
Start: 2022-06-21 | End: 2022-06-21

## 2022-06-21 RX ORDER — SODIUM CHLORIDE 9 MG/ML
20 INJECTION, SOLUTION INTRAVENOUS ONCE
Status: CANCELLED | OUTPATIENT
Start: 2022-06-22

## 2022-06-21 RX ORDER — SODIUM CHLORIDE 9 MG/ML
20 INJECTION, SOLUTION INTRAVENOUS ONCE
Status: CANCELLED | OUTPATIENT
Start: 2022-06-21

## 2022-06-21 RX ADMIN — SODIUM CHLORIDE 1000 MG: 9 INJECTION, SOLUTION INTRAVENOUS at 14:36

## 2022-06-21 RX ADMIN — SODIUM CHLORIDE 20 ML/HR: 9 INJECTION, SOLUTION INTRAVENOUS at 14:20

## 2022-06-21 NOTE — TELEPHONE ENCOUNTER
Received a phone call from patient's Ophthamologist about patient potentially needing to be admitted to the hospital     Dr Darek Del Rio states he needs to speak to Dr Shadia Hollins ASAP this morning if possible       --Call back number is [phone]

## 2022-06-21 NOTE — PROGRESS NOTES
Pt presents for Solumedrol infusion offering no compliants  Pt tolerated treatment without incident  PIV removed  AVS printed  Next appointment reviewed

## 2022-06-21 NOTE — TELEPHONE ENCOUNTER
Pt has [employer] family  Per website, PA is not needed for [address], [phone], or [id number]  Therapy plan entered and sent for signature  TT sent  Referral updated

## 2022-06-21 NOTE — TELEPHONE ENCOUNTER
Plan is signed  Called [facility] infusion center and spoke with [facility]  Pt is scheduled for the following: Today 6/21/22 @ 2pm  6/22/22 @ 12:30pm  6/23/22 @ 2:30pm    Called and Left a message on pt's answering machine for a call back ASAP  Advised it is urgent  Need to make pt aware of above infusion appts as well as ask her to get labs completed for Ocrevus this week if possible (can go to outpatient lab same day as one of her infusions)  Will try to reach pt again soon

## 2022-06-21 NOTE — TELEPHONE ENCOUNTER
Received message from Ms Liat Laird- our office was reach by ophthalmologist Dr Gaurav Sims who brought question about hospitalization of the patient    Please reach the patient with more questioned, patient may require outpatient Solu-Medrol infusion considering patient has known history of multiple sclerosis, unless stated otherwise

## 2022-06-21 NOTE — TELEPHONE ENCOUNTER
Pt made aware, she verbalizes understanding and is agreeable  She is agreeable to going to outpatient lab in the next few days as well to have labs completed for Ocrevus  Following up on Ocrevus in another encounter

## 2022-06-21 NOTE — TELEPHONE ENCOUNTER
See new encounter  Will remind pt of labs as advised in that encounter  Obtained [employer] PA form for Ocrevus  Started completion of form, awaiting lab results

## 2022-06-21 NOTE — TELEPHONE ENCOUNTER
Spoke to Dr Kimberly Aguirre at [facility]  He states patient has been experiencing 4 days of decreased vision  He examined her yesterday and she has Optic Neuritis in the right eye  I called and spoke to patient and she states she woke up Friday morning with a lot of pressure in her right eye  She states she has blurred vision in her lower visual field  She was seen by [facility] and was told she has optic neuritis  She last received IV steroids 5/9-5/11  Please assist in scheduling her 3 doses of Solumedrol 1000 mg IV in [facility] infusion center  Patient is agreeable and understands this needs to be treated ASAP to avoid permanent vision loss  I also made her aware, we need to get her going on Ocrevus ASAP  She still has pending labs  Please remind her to complete when you call her back with the infusion dates  Dr Lorena Currie, any other recommendations?

## 2022-06-22 ENCOUNTER — HOSPITAL ENCOUNTER (OUTPATIENT)
Dept: INFUSION CENTER | Facility: CLINIC | Age: 37
Discharge: HOME | End: 2022-06-22
Payer: COMMERCIAL

## 2022-06-22 VITALS
RESPIRATION RATE: 16 BRPM | TEMPERATURE: 98.5 F | HEART RATE: 84 BPM | DIASTOLIC BLOOD PRESSURE: 83 MMHG | SYSTOLIC BLOOD PRESSURE: 132 MMHG

## 2022-06-22 DIAGNOSIS — G35 MULTIPLE SCLEROSIS (HCC): Primary | ICD-10-CM

## 2022-06-22 PROCEDURE — 96365 THER/PROPH/DIAG IV INF INIT: CPT

## 2022-06-22 RX ORDER — SODIUM CHLORIDE 9 MG/ML
20 INJECTION, SOLUTION INTRAVENOUS ONCE
Status: COMPLETED | OUTPATIENT
Start: 2022-06-22 | End: 2022-06-22

## 2022-06-22 RX ORDER — SODIUM CHLORIDE 9 MG/ML
20 INJECTION, SOLUTION INTRAVENOUS ONCE
Status: CANCELLED | OUTPATIENT
Start: 2022-06-23

## 2022-06-22 RX ADMIN — SODIUM CHLORIDE 20 ML/HR: 9 INJECTION, SOLUTION INTRAVENOUS at 14:53

## 2022-06-22 RX ADMIN — SODIUM CHLORIDE 1000 MG: 9 INJECTION, SOLUTION INTRAVENOUS at 14:55

## 2022-06-22 NOTE — PROGRESS NOTES
Pt to clinic for solumedrol, offers no complaints today, tolerated infusion without complications, aware of next appointment, PIV removed, avs printed and reviewed

## 2022-06-23 ENCOUNTER — HOSPITAL ENCOUNTER (OUTPATIENT)
Dept: INFUSION CENTER | Facility: CLINIC | Age: 37
Discharge: HOME | End: 2022-06-23
Payer: COMMERCIAL

## 2022-06-23 VITALS
DIASTOLIC BLOOD PRESSURE: 67 MMHG | RESPIRATION RATE: 16 BRPM | TEMPERATURE: 97.5 F | HEART RATE: 61 BPM | SYSTOLIC BLOOD PRESSURE: 147 MMHG

## 2022-06-23 DIAGNOSIS — G35 MULTIPLE SCLEROSIS (HCC): Primary | ICD-10-CM

## 2022-06-23 PROCEDURE — 96365 THER/PROPH/DIAG IV INF INIT: CPT

## 2022-06-23 RX ORDER — SODIUM CHLORIDE 9 MG/ML
20 INJECTION, SOLUTION INTRAVENOUS ONCE
Status: COMPLETED | OUTPATIENT
Start: 2022-06-23 | End: 2022-06-23

## 2022-06-23 RX ORDER — SODIUM CHLORIDE 9 MG/ML
20 INJECTION, SOLUTION INTRAVENOUS ONCE
Status: CANCELLED | OUTPATIENT
Start: 2022-06-23

## 2022-06-23 RX ADMIN — SODIUM CHLORIDE 20 ML/HR: 9 INJECTION, SOLUTION INTRAVENOUS at 14:55

## 2022-06-23 RX ADMIN — SODIUM CHLORIDE 1000 MG: 9 INJECTION, SOLUTION INTRAVENOUS at 14:55

## 2022-07-05 NOTE — TELEPHONE ENCOUNTER
Called and Left a message on pt's answering machine for a call back  Need to remind her of needed labs

## 2022-07-07 NOTE — TELEPHONE ENCOUNTER
Called and Left a message on pt's answering machine for a call back    2nd attempt  Letter mailed with lab scripts

## 2022-08-12 DIAGNOSIS — R20.2 NUMBNESS AND TINGLING OF RIGHT ARM AND LEG: ICD-10-CM

## 2022-08-12 DIAGNOSIS — G35 MS (MULTIPLE SCLEROSIS) (HCC): ICD-10-CM

## 2022-08-12 DIAGNOSIS — R20.0 NUMBNESS AND TINGLING OF RIGHT ARM AND LEG: ICD-10-CM

## 2022-08-12 RX ORDER — ERGOCALCIFEROL 1.25 MG/1
50000 CAPSULE ORAL WEEKLY
Qty: 12 CAPSULE | Refills: 0 | Status: CANCELLED | OUTPATIENT
Start: 2022-08-12

## 2022-08-15 RX ORDER — GABAPENTIN 100 MG/1
100 CAPSULE ORAL 3 TIMES DAILY
Qty: 90 CAPSULE | Refills: 0 | Status: SHIPPED | OUTPATIENT
Start: 2022-08-15 | End: 2022-08-25 | Stop reason: SDUPTHER

## 2022-08-15 RX ORDER — BACLOFEN 10 MG/1
10 TABLET ORAL 3 TIMES DAILY
Qty: 90 TABLET | Refills: 0 | Status: SHIPPED | OUTPATIENT
Start: 2022-08-15 | End: 2022-08-25 | Stop reason: SDUPTHER

## 2022-08-22 ENCOUNTER — TELEPHONE (OUTPATIENT)
Dept: NEUROLOGY | Facility: CLINIC | Age: 37
End: 2022-08-22

## 2022-08-25 ENCOUNTER — TELEMEDICINE (OUTPATIENT)
Dept: NEUROLOGY | Facility: CLINIC | Age: 37
End: 2022-08-25
Payer: COMMERCIAL

## 2022-08-25 ENCOUNTER — TELEPHONE (OUTPATIENT)
Dept: NEUROLOGY | Facility: CLINIC | Age: 37
End: 2022-08-25

## 2022-08-25 DIAGNOSIS — F41.8 DEPRESSION WITH ANXIETY: ICD-10-CM

## 2022-08-25 DIAGNOSIS — G35 MS (MULTIPLE SCLEROSIS) (HCC): ICD-10-CM

## 2022-08-25 DIAGNOSIS — G35 MS (MULTIPLE SCLEROSIS) (HCC): Primary | ICD-10-CM

## 2022-08-25 PROCEDURE — 99214 OFFICE O/P EST MOD 30 MIN: CPT

## 2022-08-25 RX ORDER — GABAPENTIN 100 MG/1
100 CAPSULE ORAL 3 TIMES DAILY
Qty: 90 CAPSULE | Refills: 0 | Status: SHIPPED | OUTPATIENT
Start: 2022-08-25

## 2022-08-25 RX ORDER — ESCITALOPRAM OXALATE 5 MG/1
5 TABLET ORAL DAILY
Qty: 30 TABLET | Refills: 11 | Status: SHIPPED | OUTPATIENT
Start: 2022-08-25 | End: 2022-08-25

## 2022-08-25 RX ORDER — BACLOFEN 10 MG/1
10 TABLET ORAL 3 TIMES DAILY
Qty: 90 TABLET | Refills: 0 | Status: SHIPPED | OUTPATIENT
Start: 2022-08-25

## 2022-08-25 RX ORDER — ESCITALOPRAM OXALATE 5 MG/1
TABLET ORAL
Qty: 30 TABLET | Refills: 11 | Status: SHIPPED | OUTPATIENT
Start: 2022-08-25

## 2022-08-25 NOTE — ASSESSMENT & PLAN NOTE
Donny Abad self reports depression and anxiety (feeling overwhelmed, racing thoughts, sadness), likely situational due to her current home situation  She denies SI/HI  She is currently following with a psychologist/therapist   She was previously prescribed Lexapro 10 mg however did not take this as she was concerned about side effects  I discussed Lexapro, how it works, and common side effects    I will represcribe Lexapro 5 mg daily to be taken in the morning and she will continue to work with her therapist

## 2022-08-25 NOTE — LETTER
Martita Mathew  [address] [place] [id number]      Our office has been unsuccessful in trying to reach you by phone regarding:      ? Other:____________Transportation______________________________________      Please contact Ananth Fernandez  at [phone]        Sincerely,          Mariano [address] [facility]

## 2022-08-25 NOTE — TELEPHONE ENCOUNTER
----- Message from John Maganaanda sent at 8/25/2022  1:23 PM EDT -----  Regarding: Transportation needs  Pema Kim is a MS patient that we were trying to get set up for [address] [place] [address]  Unfortunately, she recently has been going through a lot at home  Her partner/children's father is bipolar and has been going through a manic episode over the last 1 month  As such he has taken the family car and will not allow Shantelle Buckner to use it for appointments  She recently moved back in with her parents (in [facility]) because of this and other behaviors  She does not have any means of transportation, and therefore has been unable to go for her lab work (so we can clear her for Ocrevus) and had to put her physical therapy appointments on hold for the same reasons  Can we assist in setting up transportation for her for labs, physical therapy, and in the future for infusions? She is already working with a psychologist/therapist virtually and I started her on Lexapro  Not sure if we have any other resources to offer her? Thank you!   Sara

## 2022-08-25 NOTE — PROGRESS NOTES
Virtual Regular Visit    Verification of patient location:    Patient is located in the following state in which I hold an active license [place]      Assessment/Plan:    Problem List Items Addressed This Visit        Nervous and Auditory    [facility]) ([facility] ) - Primary     Su Euceda is a 44 y o  female who is known to the practice for multiple sclerosis and related concerns  She was planned to complete her required serum work up, and complete her COVID-19 vaccinations prior to today's follow up for consideration of starting Ocrevus  She has been going through a lot emotionally at home and has not been able to complete her required lab work up, or continue with physical therapy, mainly due to lack of transportation  Due to all of this stress, she has been very overwhelmed, anxious and depressed but denies SI/HI  She is seeing a psychologist/therapist virtually  She has continued gabapentin and baclofen 3 times daily and has noticed an improvement in her pain with consistent use  She reports completing her first and second COVID-19 vaccines 3-4 weeks ago  She denies any new or concerning symptoms for MS relapse, including changes in vision, blurred/double vision, eye pain, changes in bowel/bladder function, new numbness, tingling, or weakness  She denies any new neurologic complaints and states she does want to move forward with initiating Ocrevus  Plan:    We discussed that I will have our social workers look into resources and transportation so that she may continue with her physical therapy, and can complete her required lab workup in addition to any future transportation needs for office appointments or infusions  I also discussed with her that due to her current home situation and self reported depression anxiety (likely situational), it would not be a bad idea for her to reconsider taking Lexapro  I will represcribed Lexapro at a smaller dose of 5 mg to start    We discussed that it may take several weeks to see the full benefit of this medication, and I have asked her to take it in the morning rather than at bed as it can cause difficulty sleeping in some patients  She will continue on baclofen and gabapentin as prescribed  I will see her back in 3 months, hopefully at that time she would have already started Ocrevus  We will have our nurse navigator follow the case  Relevant Medications    baclofen 10 mg tablet    gabapentin (NEURONTIN) 100 mg capsule    escitalopram (LEXAPRO) 5 mg tablet       Other    Depression with anxiety     [address] [place] [address] self reports depression and anxiety (feeling overwhelmed, racing thoughts, sadness), likely situational due to her current home situation  She denies SI/HI  She is currently following with a psychologist/therapist   She was previously prescribed Lexapro 10 mg however did not take this as she was concerned about side effects  I discussed Lexapro, how it works, and common side effects    I will represcribe Lexapro 5 mg daily to be taken in the morning and she will continue to work with her therapist          Relevant Medications    escitalopram (LEXAPRO) 5 mg tablet               Reason for visit is   Chief Complaint   Patient presents with    Virtual Regular Visit    Multiple Sclerosis        Encounter provider Alessandro De La Fuente, [facility]    Provider located at [address] [address]      Recent Visits  Date Type Provider Dept   08/22/22 Telephone [facility] recent visits within past 7 days and meeting all other requirements  Today's Visits  Date Type Provider Dept   08/25/22 Telephone [address]    08/25/22 Telemedicine [facility] [address] , [facility] today's visits and meeting all other requirements  Future Appointments  No visits were found meeting these conditions  Showing future appointments within next 150 days and meeting all other requirements       The patient was identified by name and date of birth  Basil Huang was informed that this is a telemedicine visit and that the visit is being conducted through [facility] and patient was informed this is a secure, HIPAA-complaint platform  She agrees to proceed     My office door was closed  No one else was in the room  She acknowledged consent and understanding of privacy and security of the video platform  The patient has agreed to participate and understands they can discontinue the visit at any time  Patient is aware this is a billable service  Subjective  Basil Huang is a 44 y o  female who is known to the practice for multiple sclerosis and related concerns  She was last seen 5/24/22, and at that time was interested in initiating treatment with Ocrevus  She was planned to complete her required serum work up, and complete her COVID-19 vaccinations prior to today's follow up  She returns today virtually as she has no means of transportation  Unfortunately she tells me she has been going through a lot emotionally due to her partner's current mental health  She reports he has bipolar disorder and is non compliant with his medications and is currently doing through a manic episode for the last 1 month  As a result, he has taken the family car from her and she can no longer attend her appointments and this is why she has not completed her labs and has recently been forced to put her physical therapy on hold  She states she has moved back in with her parents, but still does not have access to a car  Due to all of this stress, she has been very overwhelmed, anxious and depressed  In the past her PCP has prescribed Lexapro however she did not take it because she was concerned about side effects  She is seeing a psychologist/therapist virtually and denies SI/HI   She has continued gabapentin and baclofen 3 times daily  She states initially she was not using it as prescribed but after starting physical therapy, she did start using it consistently and she has noticed an improvement in her pain  She reports completing her first and second COVID-19 vaccines 3-4 weeks ago  She denies any new or concerning symptoms for MS relapse, including changes in vision, blurred/double vision, eye pain, changes in bowel/bladder function, new numbness, tingling, or weakness  She denies any new neurologic complaints and states she does want to move forward with initiating Ocrevus  HPI     Past Medical History:   Diagnosis Date    Multiple sclerosis ([facility] )        Past Surgical History:   Procedure Laterality Date     SECTION      IR LUMBAR PUNCTURE  2021       Current Outpatient Medications   Medication Sig Dispense Refill    baclofen 10 mg tablet Take 1 tablet (10 mg total) by mouth 3 (three) times a day 90 tablet 0    escitalopram (LEXAPRO) 5 mg tablet Take 1 tablet (5 mg total) by mouth daily 30 tablet 11    gabapentin (NEURONTIN) 100 mg capsule Take 1 capsule (100 mg total) by mouth 3 (three) times a day 90 capsule 0    meloxicam (MOBIC) 7 5 mg tablet Take 1 tablet (7 5 mg total) by mouth daily 30 tablet 0    cholecalciferol (VITAMIN D3) 1,000 units tablet Take 1 tablet (1,000 Units total) by mouth daily 30 tablet 0    lidocaine (LIDODERM) 5 % Apply 1 patch topically daily as needed (R  Knee pain) Remove & Discard patch within 12 hours or as directed by MD 15 patch 0    Multiple Vitamins-Minerals (MULTI-B-PLUS PO) Take by mouth daily (Patient not taking: Reported on 2022)       No current facility-administered medications for this visit  No Known Allergies    Review of Systems   Constitutional: Negative  Negative for appetite change and fever  HENT: Negative  Negative for hearing loss, tinnitus, trouble swallowing and voice change  Eyes: Negative  Negative for photophobia and pain  Respiratory: Negative  Negative for shortness of breath  Cardiovascular: Negative  Negative for palpitations  Gastrointestinal: Negative  Negative for nausea and vomiting  Endocrine: Negative  Negative for cold intolerance  Genitourinary: Negative  Negative for dysuria, frequency and urgency  Musculoskeletal: Positive for gait problem  Negative for myalgias and neck pain  Patient states she looses her balance  Skin: Negative  Negative for rash  Neurological: Positive for weakness  Negative for dizziness, tremors, seizures, syncope, facial asymmetry, speech difficulty, light-headedness, numbness and headaches  Patient states she feels tired, going through a lot personally that she feels is draining her  Hematological: Negative  Does not bruise/bleed easily  Psychiatric/Behavioral: Negative  Negative for confusion, hallucinations and sleep disturbance  Reviewed ROS as entered by medical assistant  Video Exam    There were no vitals filed for this visit  Physical Exam     On neurological examination patient is alert, awake, oriented and in no distress  Speech is fluent without dysarthria or aphasia  She is intermittently tearful discussing her current home situation  Her affect and judgement are appropriate  Her face appears symmetrical without any obvious signs of weakness  Eye movements are intact  Audition is intact to causal conversation  She moves all 4 limbs antigravity without difficulty          I spent 30 minutes directly with the patient during this visit

## 2022-08-25 NOTE — ASSESSMENT & PLAN NOTE
Zenaida Mares is a 44 y o  female who is known to the practice for multiple sclerosis and related concerns  She was planned to complete her required serum work up, and complete her COVID-19 vaccinations prior to today's follow up for consideration of starting Ocrevus  She has been going through a lot emotionally at home and has not been able to complete her required lab work up, or continue with physical therapy, mainly due to lack of transportation  Due to all of this stress, she has been very overwhelmed, anxious and depressed but denies SI/HI  She is seeing a psychologist/therapist virtually  She has continued gabapentin and baclofen 3 times daily and has noticed an improvement in her pain with consistent use  She reports completing her first and second COVID-19 vaccines 3-4 weeks ago  She denies any new or concerning symptoms for MS relapse, including changes in vision, blurred/double vision, eye pain, changes in bowel/bladder function, new numbness, tingling, or weakness  She denies any new neurologic complaints and states she does want to move forward with initiating Ocrevus  Plan:    We discussed that I will have our social workers look into resources and transportation so that she may continue with her physical therapy, and can complete her required lab workup in addition to any future transportation needs for office appointments or infusions  I also discussed with her that due to her current home situation and self reported depression anxiety (likely situational), it would not be a bad idea for her to reconsider taking Lexapro  I will represcribed Lexapro at a smaller dose of 5 mg to start  We discussed that it may take several weeks to see the full benefit of this medication, and I have asked her to take it in the morning rather than at bed as it can cause difficulty sleeping in some patients  She will continue on baclofen and gabapentin as prescribed    I will see her back in 3 months, hopefully at that time she would have already started Ocrevus  We will have our nurse navigator follow the case

## 2022-08-25 NOTE — TELEPHONE ENCOUNTER
Unable to reach patient in regards to scheduled [facility] 12[facility]  Attempted 4 times  Unable to leave message

## 2022-08-25 NOTE — TELEPHONE ENCOUNTER
MSW phoned [facility] at [phone] and spoke with Jessee Ware requested to learn if pt has Non emergency transportation to medical appointments  She reported that pt has MATP $0copay everything covered must register  Contact local [address] [place] [address] and complete the application     Reference # [id number]    MSW phoned pt and made her aware that she has transportation benefits and will need to apply through [facility]  Pt is agreeable to receive application via mail along with copy of her ID  MSW made pt aware that misael mccoy is not traveling out of [facility]  Pt informed that all her appointments are local      BirthdayFever at  pdf

## 2022-08-25 NOTE — TELEPHONE ENCOUNTER
Tried to call patient to schedule a follow up apt in 3 months but patient didn't answer and mail box was full

## 2022-09-06 NOTE — TELEPHONE ENCOUNTER
MSW phoned pt who informed that she has not received the application for transportation  She will wait a few more days

## 2022-09-13 NOTE — TELEPHONE ENCOUNTER
MSW phoned patient at [phone] who informed that she has not received kimi yet   MSW placed new application via mail

## 2022-09-23 NOTE — TELEPHONE ENCOUNTER
MSW phoned pt at [phone] to follow up regarding application for transportation  MSW was unable to lvm to pt as it is full

## 2022-11-01 DIAGNOSIS — G35 MS (MULTIPLE SCLEROSIS) (HCC): ICD-10-CM

## 2022-11-03 RX ORDER — BACLOFEN 10 MG/1
10 TABLET ORAL 3 TIMES DAILY
Qty: 90 TABLET | Refills: 3 | Status: SHIPPED | OUTPATIENT
Start: 2022-11-03

## 2022-11-03 RX ORDER — GABAPENTIN 100 MG/1
100 CAPSULE ORAL 3 TIMES DAILY
Qty: 90 CAPSULE | Refills: 3 | Status: SHIPPED | OUTPATIENT
Start: 2022-11-03

## 2022-11-14 ENCOUNTER — TELEPHONE (OUTPATIENT)
Dept: NEUROLOGY | Facility: CLINIC | Age: 37
End: 2022-11-14

## 2022-11-14 NOTE — TELEPHONE ENCOUNTER
Unable to leave message, phone prompted me to leave a SMS message  Please make patient aware her appt for 11/18/2022 needs to be rescheduled [facility] will not be available that day  Going forward [facility] is only seeing patients in [address] [place] [address]  Please make patient aware that Dr Gleda Ahumada and Dr Holly Cota are available in [place] but do have a bit of a wait

## 2022-11-16 DIAGNOSIS — M22.2X1 PATELLOFEMORAL SYNDROME, RIGHT: ICD-10-CM

## 2022-11-16 DIAGNOSIS — F41.8 DEPRESSION WITH ANXIETY: ICD-10-CM

## 2022-11-16 DIAGNOSIS — G35 MS (MULTIPLE SCLEROSIS) (HCC): ICD-10-CM

## 2022-11-16 DIAGNOSIS — M25.561 ACUTE PAIN OF RIGHT KNEE: ICD-10-CM

## 2022-11-16 RX ORDER — ESCITALOPRAM OXALATE 5 MG/1
5 TABLET ORAL DAILY
Qty: 30 TABLET | Refills: 0 | Status: CANCELLED | OUTPATIENT
Start: 2022-11-16

## 2022-11-16 RX ORDER — BACLOFEN 10 MG/1
10 TABLET ORAL 3 TIMES DAILY
Qty: 90 TABLET | Refills: 0 | Status: CANCELLED | OUTPATIENT
Start: 2022-11-16

## 2022-11-16 RX ORDER — GABAPENTIN 100 MG/1
100 CAPSULE ORAL 3 TIMES DAILY
Qty: 90 CAPSULE | Refills: 0 | Status: CANCELLED | OUTPATIENT
Start: 2022-11-16

## 2022-11-17 RX ORDER — MELOXICAM 7.5 MG/1
7.5 TABLET ORAL DAILY
Qty: 30 TABLET | Refills: 0 | Status: SHIPPED | OUTPATIENT
Start: 2022-11-17

## 2022-12-17 DIAGNOSIS — M22.2X1 PATELLOFEMORAL SYNDROME, RIGHT: ICD-10-CM

## 2022-12-17 DIAGNOSIS — M25.561 ACUTE PAIN OF RIGHT KNEE: ICD-10-CM

## 2022-12-19 RX ORDER — MELOXICAM 7.5 MG/1
TABLET ORAL
Qty: 30 TABLET | Refills: 0 | Status: SHIPPED | OUTPATIENT
Start: 2022-12-19

## 2023-01-16 DIAGNOSIS — M25.561 ACUTE PAIN OF RIGHT KNEE: ICD-10-CM

## 2023-01-16 DIAGNOSIS — M22.2X1 PATELLOFEMORAL SYNDROME, RIGHT: ICD-10-CM

## 2023-01-16 RX ORDER — MELOXICAM 7.5 MG/1
TABLET ORAL
Qty: 30 TABLET | Refills: 0 | Status: SHIPPED | OUTPATIENT
Start: 2023-01-16

## 2023-01-30 LAB
HCV AB SER-ACNC: <15
HCV AB SER-ACNC: NOT DETECTED

## 2023-02-09 LAB
JCPYV AB SERPL QL IA: NEGATIVE
SL AMB INDEX VALUE: 0.18

## 2023-02-13 ENCOUNTER — TELEMEDICINE (OUTPATIENT)
Dept: NEUROLOGY | Facility: CLINIC | Age: 38
End: 2023-02-13

## 2023-02-13 DIAGNOSIS — G35 MS (MULTIPLE SCLEROSIS) (HCC): Primary | ICD-10-CM

## 2023-02-13 NOTE — PROGRESS NOTES
Virtual Regular Visit    Verification of patient location:    Patient is located in the following state in which I hold an active license [place]      Assessment/Plan:    Problem List Items Addressed This Visit        Nervous and Auditory    MS (multiple sclerosis) ([address] ) - Primary     Dennsi Ward is a 43 y o  female known to the practice for multiple sclerosis and related concerns  She currently remains off of disease modifying therapy since her official diagnosis 7/2021, initial symptoms were approximately 10 years prior  There was initially a delay in her completing her serum work-up to initiate Ocrevus due to transportation difficulties, however she has recently completed this and is still interested in pursuing Ocrevus at this time  I will have our nurse navigator work to arrange this ASAP  Unfortunately due to her new sensory dysfunction in her right hand, I would like her to obtain a stat MRI brain and cervical spine to assess for new MS lesions considering she has remained off of disease modifying therapy and is known to have extensive white matter disease  She will also obtain an updated MRI thoracic spine on a nonurgent basis  Pending her MRI results, we will decide if treatment is needed with IV methylprednisolone  She will continue gabapentin and baclofen as prescribed  I have asked her to discontinue Lexapro as 1/4 tablet is not likely to be efficacious  She will follow-up in person in [place] in 3 months; sooner if needed          Plan:  Complete STAT MRI Brain and Cervical Spine w wo contrast  Complete updated MRI Thoracic spine w wo contrast  Will start process to obtain Ocrevus (prefers [place] infusion)  Will follow up in person in 3 months in [place]           Relevant Orders    MRI brain MS wo and w contrast    MRI cervical spine with and without contrast    MRI thoracic spine with and without contrast            Reason for visit is   Chief Complaint   Patient presents with   • Virtual Regular Visit        Encounter provider Joseph Hamlin, [address]     Provider located at [address] [facility] [address]      Recent Visits  Date Type Provider Dept   02/13/23 Telemedicine Archana Akins [place] , [address] recent visits within past 7 days and meeting all other requirements  Future Appointments  No visits were found meeting these conditions  Showing future appointments within next 150 days and meeting all other requirements       The patient was identified by name and date of birth  Carrie Mcfadden was informed that this is a telemedicine visit and that the visit is being conducted through the [employer]  She agrees to proceed     My office door was closed  No one else was in the room  She acknowledged consent and understanding of privacy and security of the video platform  The patient has agreed to participate and understands they can discontinue the visit at any time  Patient is aware this is a billable service  Subjective  Carrie Mcfadden is a 43 y o  female known to the practice for multiple sclerosis and related concerns  She was last seen via telemedicine 8/25/2022, and at that time had not completed her work-up to initiate Ocrevus  She currently remains off of disease modifying therapy since her official diagnosis 7/2021, initial symptoms were approximately 10 years prior  Unfortunately, she had been going through a lot emotionally with depression and anxiety at the time and was having significant difficulty with arranging transportation      She returns today and states she is seeing a therapist and counselor and feels that she is in a better place mentally  She feels that her anxiety and depression are under good control    I have prescribed her Lexapro 5 mg tablets however she states she did not like how she felt taking a full 5 mg tablet so has been using 1/4 tablet as needed instead  She continues on gabapentin 100 mg 3 times a day and baclofen 10 mg 3 times a day and feels that her pain is significantly improved  Unfortunately about 3 months ago she started to experience right hand numbness  She states it occurs at random and is also associated with hand cramping  She denies any changes in vision, blurred/double vision, eye pain, changes in bowel/bladder function, or other new numbness, tingling, or weakness  She continues to have pain in the right hip, which she is being treated by orthopedic medicine  She is undergoing physical therapy and is also taking meloxicam daily  She tells me she is still interested in pursuing Ocrevus and has recently completed her serum work-up      Review of recent work-up:  JCV antibody negative  CMP within normal limits; normal liver and kidney function  Immunoglobulins: IgG within normal limits, IgM within normal limits, IgA within normal limits  CBC with differential consistent with iron deficiency anemia (which is a chronic finding); absolute lymphocytes 1 4  Quantiferon gold negative  Hepatitis C negative  Hepatitis B acute panel negative     Review of prior work-up:    MRI brain 7/13/2021  Extensive white matter disease suggesting advanced demyelinating disease of multiple sclerosis  Scattered supratentorial ring-enhancing lesions suggest active demyelination  Serologic correlation is recommended  MRI cervical spine 7/14/2021  1  Multifocal cervical spinal cord signal abnormality most consistent with demyelinating disease given findings in brain MRI  No active demyelination  No cord atrophy  2   Multilevel mild canal and foraminal narrowing more pronounced at level C4-5  MRI thoracic spine 7/14/2021  Patchy cord signal abnormality at level T8-9 most consistent with demyelinating disease given findings in brain MRI  No active demyelination    No cord atrophy    Lumbar puncture  8 oligoclonal bands  Elevated myelin basic protein  NMO negative    HPI     Past Medical History:   Diagnosis Date   • Multiple sclerosis ([address] )        Past Surgical History:   Procedure Laterality Date   •  SECTION     • IR LUMBAR PUNCTURE  2021       Current Outpatient Medications   Medication Sig Dispense Refill   • baclofen 10 mg tablet Take 1 tablet (10 mg total) by mouth 3 (three) times a day 90 tablet 3   • gabapentin (NEURONTIN) 100 mg capsule Take 1 capsule (100 mg total) by mouth 3 (three) times a day 90 capsule 3   • lidocaine (LIDODERM) 5 % Apply 1 patch topically daily as needed (R  Knee pain) Remove & Discard patch within 12 hours or as directed by MD 15 patch 0   • meloxicam (MOBIC) 7 5 mg tablet Take 1 tablet by mouth once daily 30 tablet 0   • cholecalciferol (VITAMIN D3) 1,000 units tablet Take 1 tablet (1,000 Units total) by mouth daily (Patient not taking: Reported on 2023) 30 tablet 0   • Multiple Vitamins-Minerals (MULTI-B-PLUS PO) Take by mouth daily (Patient not taking: Reported on 2022)       No current facility-administered medications for this visit  No Known Allergies    Review of Systems   Constitutional: Positive for fatigue  Negative for appetite change and fever  HENT: Negative  Negative for hearing loss, tinnitus, trouble swallowing and voice change  Eyes: Negative  Negative for photophobia, pain and visual disturbance  Respiratory: Negative  Negative for shortness of breath  Cardiovascular: Negative  Negative for palpitations  Gastrointestinal: Negative  Negative for nausea and vomiting  Endocrine: Negative  Negative for cold intolerance  Genitourinary: Negative  Negative for dysuria, frequency and urgency  Musculoskeletal: Positive for gait problem  Negative for myalgias and neck pain  Skin: Negative  Negative for rash  Allergic/Immunologic: Negative  Neurological: Positive for weakness and numbness (Right hand)   Negative for dizziness, tremors, seizures, syncope, facial asymmetry, speech difficulty, light-headedness and headaches  Hematological: Negative  Does not bruise/bleed easily  Psychiatric/Behavioral: Negative  Negative for confusion, hallucinations and sleep disturbance  Reviewed ROS as entered by medical assistant  Video Exam    There were no vitals filed for this visit  Physical Exam     On neurological examination the patient was awake, alert, attentive, oriented to person, place, and time  Recent and remote memory intact to conversation with no evidence of language dysfunction  Satisfactory fund of knowledge  Normal attention span and concentration  Mood, affect and judgement are appropriate  Speech is fluent without dysarthria or aphasia  Face appears symmetric, with no obvious weakness noted  Audition is intact to casual conversation  Eye movements are intact  Tongue is midline  Able to move bilateral upper extremities antigravity without difficulty        I spent 30 minutes directly with the patient during this visit

## 2023-02-13 NOTE — PATIENT INSTRUCTIONS
Complete STAT MRI Brain and Cervical Spine w wo contrast  Complete updated MRI Thoracic spine w wo contrast  Will start process to obtain Ocrevus (prefers [place] infusion)  Will follow up in person in 3 months in [place]

## 2023-02-14 ENCOUNTER — TELEPHONE (OUTPATIENT)
Dept: NEUROLOGY | Facility: CLINIC | Age: 38
End: 2023-02-14

## 2023-02-14 NOTE — ASSESSMENT & PLAN NOTE
Tess Cohen is a 43 y o  female known to the practice for multiple sclerosis and related concerns  She currently remains off of disease modifying therapy since her official diagnosis 7/2021, initial symptoms were approximately 10 years prior  There was initially a delay in her completing her serum work-up to initiate Ocrevus due to transportation difficulties, however she has recently completed this and is still interested in pursuing Ocrevus at this time  I will have our nurse navigator work to arrange this ASAP  Unfortunately due to her new sensory dysfunction in her right hand, I would like her to obtain a stat MRI brain and cervical spine to assess for new MS lesions considering she has remained off of disease modifying therapy and is known to have extensive white matter disease  She will also obtain an updated MRI thoracic spine on a nonurgent basis  Pending her MRI results, we will decide if treatment is needed with IV methylprednisolone  She will continue gabapentin and baclofen as prescribed  I have asked her to discontinue Lexapro as 1/4 tablet is not likely to be efficacious  She will follow-up in person in [place] in 3 months; sooner if needed          Plan:  Complete STAT MRI Brain and Cervical Spine w wo contrast  Complete updated MRI Thoracic spine w wo contrast  Will start process to obtain Ocrevus (prefers [place] infusion)  Will follow up in person in 3 months in [place]

## 2023-02-14 NOTE — TELEPHONE ENCOUNTER
----- Message from New Carolyn sent at 2/14/2023 12:26 PM EST -----  Can we start the process to obtain Ocrevus (prefers [place] infusion)  Work up is finally complete

## 2023-02-15 DIAGNOSIS — M25.561 ACUTE PAIN OF RIGHT KNEE: ICD-10-CM

## 2023-02-15 DIAGNOSIS — M22.2X1 PATELLOFEMORAL SYNDROME, RIGHT: ICD-10-CM

## 2023-02-15 RX ORDER — MELOXICAM 7.5 MG/1
TABLET ORAL
Qty: 30 TABLET | Refills: 0 | Status: SHIPPED | OUTPATIENT
Start: 2023-02-15

## 2023-02-20 ENCOUNTER — TELEPHONE (OUTPATIENT)
Dept: NEUROLOGY | Facility: CLINIC | Age: 38
End: 2023-02-20

## 2023-02-20 NOTE — TELEPHONE ENCOUNTER
Infusion center called in to let office know that infusions were denied by insurance  They just wanted Dr Cortez Webb to be aware  Letter in scanned media

## 2023-02-21 NOTE — TELEPHONE ENCOUNTER
Geena Soto     EP    10:37 AM  Note   Infusion center called in to let office know that infusions were denied by insurance  They just wanted Dr Tracee Sweeney to be aware  Letter in scanned media

## 2023-02-21 NOTE — TELEPHONE ENCOUNTER
Out of all of the covered alternatives, I would suggest Tysabri  Do you mind calling her to let her know that Jaxson Beck was denied and mailing her out information on Tysabrchristopher  If she would prefer we can do a quick virtual visit to go over the information about the medication or if she feels comfortable proceeding after reviewing the information herself we can mail her out a start form

## 2023-02-21 NOTE — TELEPHONE ENCOUNTER
Received voicemail from [facility] regarding the same  Reviewed denial letter  Pt must try and fail 3 preferred MS agents: Aubagio, Avonex, Betaseron, dimethyl fumarate, Gilenya, glatiramer acetate, Glatopa, Rebif, or Tysabri  Please advise

## 2023-03-25 DIAGNOSIS — M22.2X1 PATELLOFEMORAL SYNDROME, RIGHT: ICD-10-CM

## 2023-03-25 DIAGNOSIS — M25.561 ACUTE PAIN OF RIGHT KNEE: ICD-10-CM

## 2023-03-28 RX ORDER — MELOXICAM 7.5 MG/1
7.5 TABLET ORAL DAILY
Qty: 30 TABLET | Refills: 0 | Status: SHIPPED | OUTPATIENT
Start: 2023-03-28

## 2023-05-28 ENCOUNTER — HOSPITAL ENCOUNTER (OUTPATIENT)
Dept: MRI IMAGING | Facility: HOSPITAL | Age: 38
Discharge: HOME | End: 2023-05-28

## 2023-05-28 DIAGNOSIS — G35 MS (MULTIPLE SCLEROSIS) (HCC): ICD-10-CM

## 2023-05-28 RX ADMIN — GADOBUTROL 5.5 ML: 604.72 INJECTION INTRAVENOUS at 10:41

## 2023-05-30 ENCOUNTER — TELEPHONE (OUTPATIENT)
Dept: NEUROLOGY | Facility: CLINIC | Age: 38
End: 2023-05-30

## 2023-05-30 NOTE — TELEPHONE ENCOUNTER
Called pt  Received   Left msg requesting return call  Unable to leave details as no consent on file  Called pt's parent phone number [phone] (on consent form)  Left msg requesting pt call our office and/or view her MyChart msgs      Sara armando

## 2023-05-30 NOTE — TELEPHONE ENCOUNTER
Called and spoke to Bia, I recommended inpatient admission for IV steroids STAT  She prefers to go out patient  She states she has 2 small children and cannot be admitted at this time  She has transportation to and from the infusion center  She understands going outpatient requires us to contact her insurance and sometimes due to scheduling constraints there can be a delay in getting infusions started right away  She verbalizes understanding and still prefers outpatient infusion      Please assist with scheduling 5 doses of IV 1,000 mg methylpred at [facility] STAT

## 2023-05-30 NOTE — TELEPHONE ENCOUNTER
Pt left VM returning our call  Transcribed VM:   Hey, this is Auberry Buys  I am just returning a call back  This is for [facility]  If you can give me a call back at [phone]  Thank you very much bye  Called back and spoke with pt re: relapse and need for urgent IV steroid infusions in an IP setting  Advised her that Archana or MS team would be reaching out to her ASAP to review what needs to be done  She verbalized an understanding and stated she would await the call

## 2023-05-30 NOTE — TELEPHONE ENCOUNTER
Called pt, received VM  Left a message asking for her to call the office back to review MRI results or to review her mychart messages    Also called pt's sister listed in her chart as an alternative contact  star ortega Sister     [phone]   She states she believes pt had a therapy appt today, she will pass along the message to have pt call us back to review MRIs and/or to review her mychart messages  Will await contact from the patient  If we do not hear back from her by end of day, please call her again

## 2023-05-30 NOTE — TELEPHONE ENCOUNTER
----- Message from John Carolyn sent at 5/29/2023  6:13 PM EDT -----  I was contacted by Radiology's reading room yesterday with immediate findings on patient's MRIs  I requested the on call neurologist be contacted with these results  I do not see that the neurologist on call was contacted (no notes), also no notes that patient was contacted either  I sent msg to patient today with results and recommended urgent direct admission for IV steroids for acute MS Relapse  Awaiting her response, please follow up with her tomorrow morning if we do not hear back by then  Cc'bc Talbot Minium- please follow up on what happened to on call neurologist notification

## 2023-05-31 ENCOUNTER — HOSPITAL ENCOUNTER (OUTPATIENT)
Dept: INFUSION CENTER | Facility: CLINIC | Age: 38
Discharge: HOME | End: 2023-05-31

## 2023-05-31 VITALS — TEMPERATURE: 97.3 F | HEART RATE: 73 BPM | DIASTOLIC BLOOD PRESSURE: 80 MMHG | SYSTOLIC BLOOD PRESSURE: 119 MMHG

## 2023-05-31 DIAGNOSIS — G35 MULTIPLE SCLEROSIS (HCC): Primary | ICD-10-CM

## 2023-05-31 RX ORDER — SODIUM CHLORIDE 9 MG/ML
20 INJECTION, SOLUTION INTRAVENOUS ONCE
Status: CANCELLED | OUTPATIENT
Start: 2023-05-31

## 2023-05-31 RX ORDER — SODIUM CHLORIDE 9 MG/ML
20 INJECTION, SOLUTION INTRAVENOUS ONCE
Status: COMPLETED | OUTPATIENT
Start: 2023-05-31 | End: 2023-05-31

## 2023-05-31 RX ORDER — SODIUM CHLORIDE 9 MG/ML
20 INJECTION, SOLUTION INTRAVENOUS ONCE
Status: CANCELLED | OUTPATIENT
Start: 2023-06-01

## 2023-05-31 RX ADMIN — SODIUM CHLORIDE 20 ML/HR: 9 INJECTION, SOLUTION INTRAVENOUS at 15:27

## 2023-05-31 RX ADMIN — SODIUM CHLORIDE 1000 MG: 0.9 INJECTION, SOLUTION INTRAVENOUS at 15:27

## 2023-05-31 NOTE — TELEPHONE ENCOUNTER
[phone]  Spoke w/Carleen  Provided the following codes:     Solumedrol IV  [address]; [facility] codes    Per Carleen, no PA is required for any of the above codes  Ref# [id number]    Therapy plan entered and sent for signature  TT sent  [facility]  Spoke w/Jana  She can schedule patient for 3pm today  She will work on scheduling additional 4 infusion days

## 2023-05-31 NOTE — PROGRESS NOTES
Patient reports to center for D1/5 of solumedrol, VSS  Patient tolerated infusion well without any adverse events, positive blood return throughout  Patient aware of next appointment, AVS declined

## 2023-05-31 NOTE — TELEPHONE ENCOUNTER
Pt scheduled for Solumedrol infusions on the following dates/times: Wednesday, 5/31/23 @ 3pm  Thursday, 6/1/23 @ 3pm  Friday, 6/2/23 @ 2:30pm  Monday, 6/5/23 @ 1:30pm  Tuesday, 6/6/23 @ 2:30    MyChart msg sent to pt advising of above appts

## 2023-05-31 NOTE — TELEPHONE ENCOUNTER
Called pt's alternative contact, sister Kimberley Bassett  Received   Left msg advising sister to have pt call our office or read her MyChart msg

## 2023-05-31 NOTE — TELEPHONE ENCOUNTER
Called pt  Received vm  Left msg requesting return call asap  [facility] does have an appt today at 3pm if we can get a hold of patient  Nursing - please make sure patient is aware that no children are allowed at infusion center

## 2023-05-31 NOTE — TELEPHONE ENCOUNTER
May 31, 2023  Me  to [place]      5/31/23 11:50 AM  Dre Hernandez,     You are scheduled for urgent Solumedrol infusions on the following dates/times:     Wednesday, 5/31/23 @ 3pm  Today  Thursday, 6/1/23 @ 3pm  Friday, 6/2/23 @ 2:30pm  Monday, 6/5/23 @ 1:30pm  Tuesday, 6/6/23 @ 2:30  [address] [address] (p) [phone]     Please note - children are not allowed in the infusion center      Please call the office at [phone] or respond to this message to let us know you are agreeable to the above infusion appointments      Thank you,     Jennifer RO RN

## 2023-06-01 ENCOUNTER — HOSPITAL ENCOUNTER (OUTPATIENT)
Dept: INFUSION CENTER | Facility: CLINIC | Age: 38
Discharge: HOME | End: 2023-06-01
Payer: COMMERCIAL

## 2023-06-01 VITALS
SYSTOLIC BLOOD PRESSURE: 137 MMHG | DIASTOLIC BLOOD PRESSURE: 65 MMHG | RESPIRATION RATE: 18 BRPM | HEART RATE: 91 BPM | TEMPERATURE: 98.1 F

## 2023-06-01 DIAGNOSIS — G35 MULTIPLE SCLEROSIS (HCC): Primary | ICD-10-CM

## 2023-06-01 PROCEDURE — 96365 THER/PROPH/DIAG IV INF INIT: CPT

## 2023-06-01 RX ORDER — SODIUM CHLORIDE 9 MG/ML
20 INJECTION, SOLUTION INTRAVENOUS ONCE
Status: CANCELLED | OUTPATIENT
Start: 2023-06-02

## 2023-06-01 RX ORDER — SODIUM CHLORIDE 9 MG/ML
20 INJECTION, SOLUTION INTRAVENOUS ONCE
Status: COMPLETED | OUTPATIENT
Start: 2023-06-01 | End: 2023-06-01

## 2023-06-01 RX ADMIN — SODIUM CHLORIDE 20 ML/HR: 9 INJECTION, SOLUTION INTRAVENOUS at 15:36

## 2023-06-01 RX ADMIN — SODIUM CHLORIDE 1000 MG: 0.9 INJECTION, SOLUTION INTRAVENOUS at 15:39

## 2023-06-01 NOTE — PROGRESS NOTES
Patient reports to unit for D2/5 of solumedrol  Patient reports no changes, VSS  Patient tolerated infusion well without any adverse events, positive blood return throughout  Patient aware of next appointment, AVS declined

## 2023-06-02 ENCOUNTER — HOSPITAL ENCOUNTER (OUTPATIENT)
Dept: INFUSION CENTER | Facility: CLINIC | Age: 38
End: 2023-06-02
Payer: COMMERCIAL

## 2023-06-02 VITALS
HEART RATE: 78 BPM | DIASTOLIC BLOOD PRESSURE: 64 MMHG | TEMPERATURE: 97.3 F | SYSTOLIC BLOOD PRESSURE: 117 MMHG | RESPIRATION RATE: 18 BRPM

## 2023-06-02 DIAGNOSIS — G35 MULTIPLE SCLEROSIS (HCC): Primary | ICD-10-CM

## 2023-06-02 RX ORDER — SODIUM CHLORIDE 9 MG/ML
20 INJECTION, SOLUTION INTRAVENOUS ONCE
Status: COMPLETED | OUTPATIENT
Start: 2023-06-02 | End: 2023-06-02

## 2023-06-02 RX ORDER — SODIUM CHLORIDE 9 MG/ML
20 INJECTION, SOLUTION INTRAVENOUS ONCE
Status: CANCELLED | OUTPATIENT
Start: 2023-06-03

## 2023-06-02 RX ADMIN — SODIUM CHLORIDE 1000 MG: 0.9 INJECTION, SOLUTION INTRAVENOUS at 15:13

## 2023-06-02 RX ADMIN — SODIUM CHLORIDE 20 ML/HR: 0.9 INJECTION, SOLUTION INTRAVENOUS at 15:10

## 2023-06-02 NOTE — PROGRESS NOTES
Pt to clinic for D3/5 of Solumedrol  Denies recent illness or antibiotic use  Tolerated infusion without complications  PIV removed  Aware of next appointment  AVS declined

## 2023-06-05 ENCOUNTER — HOSPITAL ENCOUNTER (OUTPATIENT)
Dept: INFUSION CENTER | Facility: CLINIC | Age: 38
Discharge: HOME | End: 2023-06-05
Payer: COMMERCIAL

## 2023-06-05 VITALS
SYSTOLIC BLOOD PRESSURE: 132 MMHG | TEMPERATURE: 97.6 F | DIASTOLIC BLOOD PRESSURE: 60 MMHG | HEART RATE: 78 BPM | RESPIRATION RATE: 18 BRPM

## 2023-06-05 DIAGNOSIS — G35 MULTIPLE SCLEROSIS (HCC): Primary | ICD-10-CM

## 2023-06-05 RX ORDER — SODIUM CHLORIDE 9 MG/ML
20 INJECTION, SOLUTION INTRAVENOUS ONCE
Status: COMPLETED | OUTPATIENT
Start: 2023-06-05 | End: 2023-06-05

## 2023-06-05 RX ORDER — SODIUM CHLORIDE 9 MG/ML
20 INJECTION, SOLUTION INTRAVENOUS ONCE
Status: CANCELLED | OUTPATIENT
Start: 2023-06-06

## 2023-06-05 RX ADMIN — SODIUM CHLORIDE 1000 MG: 0.9 INJECTION, SOLUTION INTRAVENOUS at 13:59

## 2023-06-05 RX ADMIN — SODIUM CHLORIDE 20 ML/HR: 0.9 INJECTION, SOLUTION INTRAVENOUS at 13:58

## 2023-06-05 NOTE — PROGRESS NOTES
Pt to clinic for solumedrol infusion  Pt offers no complaints today  Tolerated infusion without complications  Pt aware of next appointment  PIV removed  AVS was received by pt  Pt ambulated out of clinic safely

## 2023-06-06 ENCOUNTER — HOSPITAL ENCOUNTER (OUTPATIENT)
Dept: INFUSION CENTER | Facility: CLINIC | Age: 38
Discharge: HOME | End: 2023-06-06
Payer: COMMERCIAL

## 2023-06-06 VITALS
SYSTOLIC BLOOD PRESSURE: 126 MMHG | HEART RATE: 86 BPM | DIASTOLIC BLOOD PRESSURE: 77 MMHG | TEMPERATURE: 98 F | RESPIRATION RATE: 18 BRPM

## 2023-06-06 DIAGNOSIS — G35 MULTIPLE SCLEROSIS (HCC): Primary | ICD-10-CM

## 2023-06-06 RX ORDER — SODIUM CHLORIDE 9 MG/ML
20 INJECTION, SOLUTION INTRAVENOUS ONCE
Status: COMPLETED | OUTPATIENT
Start: 2023-06-06 | End: 2023-06-06

## 2023-06-06 RX ORDER — SODIUM CHLORIDE 9 MG/ML
20 INJECTION, SOLUTION INTRAVENOUS ONCE
Status: CANCELLED | OUTPATIENT
Start: 2023-06-06

## 2023-06-06 RX ADMIN — SODIUM CHLORIDE 20 ML/HR: 9 INJECTION, SOLUTION INTRAVENOUS at 14:58

## 2023-06-06 RX ADMIN — SODIUM CHLORIDE 1000 MG: 0.9 INJECTION, SOLUTION INTRAVENOUS at 14:58

## 2023-06-06 NOTE — PROGRESS NOTES
Pt presents for IV solumedrol offering no compliants  Pt tolerated treatment without incident  PIV removed  AVS declined  Therapy plan complete

## 2023-06-07 ENCOUNTER — TELEPHONE (OUTPATIENT)
Dept: NEUROLOGY | Facility: CLINIC | Age: 38
End: 2023-06-07

## 2023-06-07 NOTE — TELEPHONE ENCOUNTER
Fax received from [facility] authority  Ranjit  See under media tab 6/5  Archana - Please advise

## 2023-06-08 ENCOUNTER — TELEPHONE (OUTPATIENT)
Dept: NEUROLOGY | Facility: CLINIC | Age: 38
End: 2023-06-08

## 2023-06-08 NOTE — TELEPHONE ENCOUNTER
"See 2/14/23 and 5/30/23 encounters  Ocrevus infusions denied  Per Soraya Key, \"Out of all of the covered alternatives, I would suggest Tysabri  Do you mind calling her to let her know that Taylor Reveles was denied and mailing her out information on Tysabri  If she would prefer we can do a quick virtual visit to go over the information about the medication or if she feels comfortable proceeding after reviewing the information herself we can mail her out a start form  \"     Since message was sent, pt experienced MS relapse and received IV Solumedrol  Pt was also no show for 5/15/23 appt  Pt would benefit from neuro appt at this time  Called pt  She accepted appt with Archana on 6/13/23  Pt confirmed PCP, phone number, and insurance information on file with no changes  FYI, add on    "

## 2023-06-12 NOTE — TELEPHONE ENCOUNTER
I can confirm she has MS but the form is also asking if she is requesting reasonable accommodations? Please confirm with her  Also, they are requesting to know if she requires a live in aide in order to continue to reside in her unit? Please confirm and I will finish completing her form   thnx

## 2023-06-13 ENCOUNTER — OFFICE VISIT (OUTPATIENT)
Dept: NEUROLOGY | Facility: CLINIC | Age: 38
End: 2023-06-13
Payer: COMMERCIAL

## 2023-06-13 VITALS
WEIGHT: 135 LBS | BODY MASS INDEX: 25.49 KG/M2 | HEART RATE: 78 BPM | TEMPERATURE: 97.8 F | DIASTOLIC BLOOD PRESSURE: 64 MMHG | SYSTOLIC BLOOD PRESSURE: 108 MMHG | HEIGHT: 61 IN

## 2023-06-13 DIAGNOSIS — G35 MS (MULTIPLE SCLEROSIS) (HCC): Primary | ICD-10-CM

## 2023-06-13 PROCEDURE — 99215 OFFICE O/P EST HI 40 MIN: CPT

## 2023-06-13 RX ORDER — GABAPENTIN 300 MG/1
300 CAPSULE ORAL 3 TIMES DAILY
Qty: 90 CAPSULE | Refills: 3 | Status: SHIPPED | OUTPATIENT
Start: 2023-06-13

## 2023-06-13 RX ORDER — BACLOFEN 10 MG/1
10 TABLET ORAL 3 TIMES DAILY
Qty: 90 TABLET | Refills: 3 | Status: SHIPPED | OUTPATIENT
Start: 2023-06-13

## 2023-06-13 RX ORDER — AMOXICILLIN 500 MG/1
CAPSULE ORAL
COMMUNITY
Start: 2023-05-24 | End: 2023-06-13

## 2023-06-13 RX ORDER — ESCITALOPRAM OXALATE 10 MG/1
10 TABLET ORAL DAILY
COMMUNITY
Start: 2023-04-13 | End: 2023-06-13

## 2023-06-13 NOTE — PROGRESS NOTES
Patient ID: Jonn Brittle is a 43 y o  female  Assessment/Plan:    MS [facility] sclerosis) ([facility] )  Jonn Brittle is a 43year old female with MS not on DMT with recent relapse with at least 6 new/acute and enhancing demyelinating lesions of her brain  We again discussed the role of DMT in preventing relapses/new lesions and therefore preventing further permanent disability  Her insurance requires she try and fail 3 preferred MS agents  We discussed given the options provided by her insurance, Tysabri would be the most optimal choice  We discussed the mechanism of action of Tysabri, administration and frequency, risks v benefits, required monitoring, REMS program, and risk of PML  She is JCV negative  She was agreeable to proceeding and signed a start form today  I have asked her to complete an updated CBC and Iron panel, as her prior CBC was concerning for anemia which she does confirm a prior hx of in the past  Pending results will consider referral to hematology  Given her continued numbness and tingling in the right hand, I have asked her to further increased her gabapentin from 100 mg tid, to 300 mg bid or tid (as tolerated)  We discussed her MRI C-Spine does reveal moderate to severe right foraminal stenosis  There is concern that her right hand numbness and pain may actually be cervical radiculopathy related  As such I have also asked to begin physical therapy for her neck, in addition to increasing gabapentin  May consider EMG if no improvement  She will follow up in 3 months; sooner if needed  Plan:  - Will start Tysabri (start form signed today)  - Complete labs (CBC and Iron panel)  - Continue physical therapy  - Increase Gabapentin to 300 mg twice or three times a day   - Follow up in 3 months        Diagnoses and all orders for this visit:    MS (multiple sclerosis) ([facility])  -     gabapentin (NEURONTIN) 300 mg capsule;  Take 1 capsule (300 mg total) by mouth 3 (three) times a day  -     baclofen 10 mg tablet; Take 1 tablet (10 mg total) by mouth 3 (three) times a day  -     CBC and differential; Future  -     Iron Panel (Includes Ferritin, Iron Sat%, Iron, and TIBC); Future         I have spent a total time of 60 minutes on 06/13/23 in caring for this patient including Diagnostic results, Prognosis, Risks and benefits of tx options, Instructions for management, Patient and family education, Importance of tx compliance, Risk factor reductions, Impressions, Counseling / Coordination of care, Documenting in the medical record, Reviewing / ordering tests, medicine, procedures   and Obtaining or reviewing history    Subjective:    DAMON Roxy Ogden a 43 y o  female known to the practice for multiple sclerosis and related concerns  She currently remains off of disease modifying therapy since her official diagnosis 7/2021, initial symptoms were approximately 10 years prior  She was last seen 2/13/23, at that time we attempted to initiate Radha Bacon however her insurance did not approve this  Her insurance requires she first try and fail 3 preferred MS agents: Aubagio, Avonex, Betaseron, dimethyl fumarate, Gilenya, glatiramer acetate, Glatopa, Rebif, or Tysabri  The office attempted to contact her to discuss this further however, she was lost to follow up  Unfortunately on 5/28/23 she underwent MRI imaging ordered STAT at her last appt 2/13/23 but was delayed due to various personal reasons and these MRIs revealed:    MRI Brain wwo 5/28/23  Large burden intracranial demyelinating disease with six new acute/active demyelinating foci as described  New 0 4 cm demyelinating lesion in the right medulla without enhancement  MRI Cervical spine wwo 5/28/23  1  Stable cervical spinal cord demyelinating lesions  No new or active demyelinating lesion  2   Degenerative change as detailed with multilevel mild canal stenosis pronounced at levels C4-5 and C6-7   3   Moderate to severe right foraminal stenosis at C5-6  "Correlate for right C6 radiculopathy  As a result she underwent  1g of IV steroids x 5 days 5/31/23-6/6/23  She returns to the office today to follow up  She returns to the office today to discuss initiating disease modifying therapy  She reports for the weeks leading up to her imaging \"not feeling well\", having more numbness, tingling, balance difficulty, body pain (especially neck and hand pain), speech and cognitive difficulty  She states despite IV steroids, she continues to have right hand tingling, occasional slurred speech and more memory difficulty  She is currently attending physical therapy at [facility] 2 times per week  She continues on Baclofen 10 mg tid, gabapentin 100 mg tid  She denies any adverse effects  She has discontinued Lexapro as she was unable to tolerate it  She reports she is still following with her therapist and really connects with her  Although still dealing with several personal struggles at home, she feels hopeful and feels her mood is stable  We completed a housing form for her today so that she may obtain housing with her children (she required proof of dx)  In the interim, they are still residing with her mother  She is currently taking classes to become a   She also now has consistent transportation  She continues to be independent of all ADLs, denies any ambulatory dysfunction  Denies any weakness, headaches, dizziness, vision changes, or changes in bowel or bladder  The following portions of the patient's history were reviewed and updated as appropriate: allergies, current medications, past family history, past medical history, past social history and past surgical history  Objective:    Blood pressure 108/64, pulse 78, temperature 97 8 °F (36 6 °C), temperature source Temporal, height 5' 1\" (1 549 m), weight 61 2 kg (135 lb), not currently breastfeeding      Neurological Exam    On neurological examination patient is alert, awake, oriented and in no " distress  Speech is fluent without dysarthria or aphasia  Cranial nerves 2-12 were symmetrically intact bilaterally  No evidence of any focal weakness or sensory loss in the upper or lower extremities  Motor testing reveals 5/5 strength of the bilateral upper and lower extremities  There was no pronator drift  No fasciculations present  No abnormal involuntary movements  Finger- to-nose reveals no tremor or ataxia and intact proprioceptive function, no dysmetria was noted  Rapid alternating movement normal  Sensation was intact to vibration, light touch, and temperature in bilateral upper and lower extremities  Deep tendon reflexes were 2+ and symmetric in the bilateral upper and lower extremities  She is able to rise easily without assistance from a seated position  Casual gait is normal including stance, stride, and arm swing  Difficulty with tandem gait  Romberg is absent  ROS:    Review of Systems   Constitutional: Negative  Negative for appetite change and fever  HENT: Negative  Negative for hearing loss, tinnitus, trouble swallowing and voice change  Eyes: Negative  Negative for photophobia, pain and visual disturbance  Respiratory: Negative  Negative for shortness of breath  Cardiovascular: Negative  Negative for palpitations  Gastrointestinal: Negative  Negative for nausea and vomiting  Endocrine: Negative  Negative for cold intolerance  Genitourinary: Negative  Negative for dysuria, frequency and urgency  Musculoskeletal: Positive for gait problem (balance concerns) and myalgias (Both hands)  Negative for neck pain  Skin: Negative  Negative for rash  Allergic/Immunologic: Negative  Neurological: Positive for speech difficulty (slurred speech)  Negative for dizziness, tremors, seizures, syncope, facial asymmetry, weakness, light-headedness, numbness and headaches  Hematological: Negative  Does not bruise/bleed easily     Psychiatric/Behavioral: Positive for confusion (memory concerns)  Negative for hallucinations and sleep disturbance  Reviewed ROS as entered by medical assistant

## 2023-06-13 NOTE — PATIENT INSTRUCTIONS
- Will start Tysabri  - Complete labs  - Continue physical therapy  - Increase Gabapentin to 300 mg twice or three times a day   - Follow up in 3 months

## 2023-06-15 ENCOUNTER — TELEPHONE (OUTPATIENT)
Dept: NEUROLOGY | Facility: CLINIC | Age: 38
End: 2023-06-15

## 2023-06-15 DIAGNOSIS — M54.12 CERVICAL RADICULOPATHY: Primary | ICD-10-CM

## 2023-06-15 NOTE — TELEPHONE ENCOUNTER
David, my name is Nelsy Che and this message is for doctor Sara  I would like to know if she put in the script for my therapy for my neck  I just had PT  today and he wanted to know if there was a script put in so I can get the therapy for my neck  Thank you very much  Have a good one bye    Cb [phone]

## 2023-06-16 ENCOUNTER — TELEPHONE (OUTPATIENT)
Dept: NEUROLOGY | Facility: CLINIC | Age: 38
End: 2023-06-16

## 2023-06-16 DIAGNOSIS — D50.9 IRON DEFICIENCY ANEMIA: Primary | ICD-10-CM

## 2023-06-16 NOTE — TELEPHONE ENCOUNTER
Per chart review, pt had MARQUISE taylor/Sara on 6/12/23  All questions answered  Form completed (in media folder) and faxed to [place] on 6/13/23  Task complete

## 2023-06-16 NOTE — TELEPHONE ENCOUNTER
Tysabri Start Form and copies of insurance card faxed to:    [phone] (f)    Confirmation recd. Will scan into pt's chart on Monday. No scanner available at this office.

## 2023-06-16 NOTE — TELEPHONE ENCOUNTER
Spoke w/pt. She states recent gabapentin dose increase is too much for her. She is extremely fatigued by the afternoon. Patient would like to take the following dose if Sara is agreeable:    100mg - 8am  100mg - 2pm  300mg - bedtime       Sara - please advise. Thank you!

## 2023-06-17 PROBLEM — G35 MULTIPLE SCLEROSIS (HCC): Status: RESOLVED | Noted: 2022-04-25 | Resolved: 2023-06-17

## 2023-06-19 NOTE — TELEPHONE ENCOUNTER
Spoke with pt and advised her of Archana's response and recommendation. Pt to call back with update on how she feels on this regimen.

## 2023-06-22 ENCOUNTER — TELEPHONE (OUTPATIENT)
Dept: NEUROLOGY | Facility: CLINIC | Age: 38
End: 2023-06-22

## 2023-06-22 NOTE — TELEPHONE ENCOUNTER
Did receive fax confirming pt is enrolled in touch program for [facility]. Still awaiting BI.

## 2023-06-22 NOTE — TELEPHONE ENCOUNTER
Patient left voicemail requesting call back regarding prescription for medication (did not specify which one) as well as script for PT [facility]. [phone].

## 2023-06-27 NOTE — TELEPHONE ENCOUNTER
No BI received. Attempted to locate pt in TOUCH portal. Sara must be registered in system. This is in progress.

## 2023-06-28 NOTE — TELEPHONE ENCOUNTER
Received VM transcription:    Hello, my name is Stephen Garcia. I'm just calling, trying to get an update on a script that was filled for me. And I just wanted to know if you guys can send it to my PT place. I have their number and everything, but you guys can give me a call back at [phone]. Thank you and have a good one bye.  --------------------------------------------------------    Spoke with pt and she says she is not referring to medication but rather, she is requesting PT order be sent to [facility]. Fax [phone]. Order faxed.

## 2023-07-06 NOTE — TELEPHONE ENCOUNTER
Tysabri is approved with P from 6/30/23 to 12/30/23 at [facility]. Auth # [id number]. Therapy plan entered and sent for signature. Referral updated.

## 2023-07-08 DIAGNOSIS — G35 MS (MULTIPLE SCLEROSIS) (HCC): Primary | ICD-10-CM

## 2023-07-08 RX ORDER — SODIUM CHLORIDE 9 MG/ML
20 INJECTION, SOLUTION INTRAVENOUS ONCE
OUTPATIENT
Start: 2023-07-13

## 2023-07-08 RX ORDER — ACETAMINOPHEN 325 MG/1
650 TABLET ORAL ONCE
OUTPATIENT
Start: 2023-07-13 | End: 2023-07-13

## 2023-07-10 ENCOUNTER — TELEPHONE (OUTPATIENT)
Dept: NEUROLOGY | Facility: CLINIC | Age: 38
End: 2023-07-10

## 2023-07-10 NOTE — TELEPHONE ENCOUNTER
Received VM Transcription:    David, my name is Dale Storm. My birthday is [birth year]. If you can just give me a call back. I received the message stating that I have 3 labs as due in 3 days. And I'm not sure about the labs that's due rekha I just got some blood work done. But if something has changed and I need to get it done, can you please just give me a call back so I know? Thank you so much.

## 2023-07-12 ENCOUNTER — TELEPHONE (OUTPATIENT)
Dept: NEUROLOGY | Facility: CLINIC | Age: 38
End: 2023-07-12

## 2023-07-12 NOTE — TELEPHONE ENCOUNTER
Patient left voicemail stating she is not doing well, this is day 4. Both hands tingling. Not sure if she should take medication. Call returned to patient, [phone]. MS triage complete. New numbness/tingling? Tingling to both hands. Right greater than left. No numbness. New Weakness/muscle spasms? Muscle spasms to knee - mostly related to PT. Bowel/Bladder Changes? No  UTI Sx? No.  Vision changes? No  Recent illness (cough, cold, chills, fever, sinus infection, URI, UTI, etc.)? Sore throat started 7/11/2023. Taking any disease modifying medication (copaxone, tecifdera, tysabri etc.) No  Missed doses? N/A  New or worsening fatigue? Worsening fatigue  Date/Type of Last MRI (brain/Tspine/Cspine)? MRI 5/2023 - 6 new lesions on brain  History of IV steroids? Yes, tolerated fine. Unsure - 1-2 months ago. Infusion center. Increase in stress? Nothing more than normal. Not new. Any drug use (including prescribed medical marijuana, cannabis, and/or CBD, illicit street drugs etc) Marijuana at night, once or twice per week. No drive or motivation. Extreme fatigue. No other information to add. Patient requesting your thoughts.

## 2023-07-12 NOTE — TELEPHONE ENCOUNTER
Is she taking her gabapentin? When is she scheduled to start Tysabri? Cc'ing Carleen to get an update on this. If having a sore throat, she could be having a pseudoexacerbation related to infection. She should be seen by PCP and/or urgent care to evaluate throat. Once evaluated by PCP, we could consider 1 dose of IV steroids to get her through to first Tysabri dose.

## 2023-07-13 NOTE — TELEPHONE ENCOUNTER
Spoke with the patient, made her aware of below. She reports she is taking gabapentin 300mg HS only. States she tried taking in AM once and felt too drowsy. Pt still has sore throat, denies other signs of infection/illness. She will see PCP or urgent care for throat evaluation. Pt aware to contact our office once complete so that we may determine next steps. [facility] is now ready to schedule (likely able to get appt in next 1-2 weeks). Did not schedule yet due to possibility of infection.

## 2023-07-13 NOTE — TELEPHONE ENCOUNTER
Called and Left a message on pt's answering machine for a call back. [facility] is now ready to schedule (likely able to get appt in next 1-2 weeks). Will await further information from pt prior to scheduling to confirm timing due to possible illness.

## 2023-07-18 NOTE — TELEPHONE ENCOUNTER
Called pt. She reports she will see her PCP today at 2pm. States she feels as though she is coming down with a cold. Pt reports she does not feel like herself. Has no drive to finish projects. Feels drained. Hands are tingling and legs feel like jello. Pt upset and tearful, denies suicidal ideation. I did advise pt that any infection or illness may cause worsening of MS symptoms. Pt will see her PCP today and contact our office with updates after.  Encouraged rest.

## 2023-07-19 NOTE — TELEPHONE ENCOUNTER
Let's proceed with 1 dose 1gm IV solu medrol this week and then have her scheduled for Tysabri 10 days afterward.

## 2023-07-19 NOTE — TELEPHONE ENCOUNTER
Gagan  7/18 taken off 7/19    Hello, I just came up to my doctor and  what they found out was the reason my throat was the way it is from acid reflux, and seasonal allergies. Also water in my ear and drips from ear to my throat.  but you can see everything in my portal.   [phone]

## 2023-07-20 ENCOUNTER — HOSPITAL ENCOUNTER (OUTPATIENT)
Dept: INFUSION CENTER | Facility: CLINIC | Age: 38
Discharge: HOME | End: 2023-07-20
Payer: COMMERCIAL

## 2023-07-20 VITALS
SYSTOLIC BLOOD PRESSURE: 117 MMHG | RESPIRATION RATE: 18 BRPM | HEART RATE: 89 BPM | DIASTOLIC BLOOD PRESSURE: 64 MMHG | TEMPERATURE: 97 F

## 2023-07-20 DIAGNOSIS — G35 MS (MULTIPLE SCLEROSIS) (HCC): Primary | ICD-10-CM

## 2023-07-20 PROCEDURE — 96365 THER/PROPH/DIAG IV INF INIT: CPT

## 2023-07-20 RX ORDER — SODIUM CHLORIDE 9 MG/ML
20 INJECTION, SOLUTION INTRAVENOUS ONCE
Status: CANCELLED | OUTPATIENT
Start: 2023-07-20

## 2023-07-20 RX ORDER — SODIUM CHLORIDE 9 MG/ML
20 INJECTION, SOLUTION INTRAVENOUS ONCE
Status: COMPLETED | OUTPATIENT
Start: 2023-07-20 | End: 2023-07-20

## 2023-07-20 RX ADMIN — SODIUM CHLORIDE 1000 MG: 0.9 INJECTION, SOLUTION INTRAVENOUS at 16:16

## 2023-07-20 RX ADMIN — SODIUM CHLORIDE 20 ML/HR: 0.9 INJECTION, SOLUTION INTRAVENOUS at 16:12

## 2023-07-20 NOTE — TELEPHONE ENCOUNTER
No PA needed for , [id number], or [id number] per [facility] website. Therapy plan entered and sent for signature. Referral updated. Message sent to attending physician for plan signature.

## 2023-07-20 NOTE — PROGRESS NOTES
Pt to clinic for solumedrol, offers no complaints today, tolerated infusion without complications, aware of next appointment, PIV removed, avs declined but schedule printed and reviewed.

## 2023-07-20 NOTE — TELEPHONE ENCOUNTER
Plan is signed. Called [facility] and spoke with [place]. Scheduled pt for the following:    Solumedrol today 7/20/23 @ 4pm  Tysabri 7/31/23 @ 1pm    Pt made aware, she is agreeable. tr Ruiz.

## 2023-07-27 DIAGNOSIS — G35 MS (MULTIPLE SCLEROSIS) (HCC): Primary | ICD-10-CM

## 2023-07-27 RX ORDER — ACETAMINOPHEN 325 MG/1
650 TABLET ORAL ONCE
Status: CANCELLED | OUTPATIENT
Start: 2023-07-31 | End: 2023-07-31

## 2023-07-27 RX ORDER — SODIUM CHLORIDE 9 MG/ML
20 INJECTION, SOLUTION INTRAVENOUS ONCE
Status: CANCELLED | OUTPATIENT
Start: 2023-07-31

## 2023-07-31 ENCOUNTER — HOSPITAL ENCOUNTER (OUTPATIENT)
Dept: INFUSION CENTER | Facility: CLINIC | Age: 38
Discharge: HOME | End: 2023-07-31
Payer: COMMERCIAL

## 2023-07-31 VITALS
RESPIRATION RATE: 17 BRPM | SYSTOLIC BLOOD PRESSURE: 143 MMHG | TEMPERATURE: 97.8 F | HEART RATE: 87 BPM | DIASTOLIC BLOOD PRESSURE: 82 MMHG

## 2023-07-31 DIAGNOSIS — G35 MS (MULTIPLE SCLEROSIS) (HCC): Primary | ICD-10-CM

## 2023-07-31 LAB
ALBUMIN SERPL BCP-MCNC: 4.1 G/DL (ref 3.5–5)
ALP SERPL-CCNC: 34 U/L (ref 34–104)
ALT SERPL W P-5'-P-CCNC: 10 U/L (ref 7–52)
ANION GAP SERPL CALCULATED.3IONS-SCNC: 7 MMOL/L
AST SERPL W P-5'-P-CCNC: 11 U/L (ref 13–39)
BASOPHILS # BLD AUTO: 0.04 THOUSANDS/ÂΜL (ref 0–0.1)
BASOPHILS NFR BLD AUTO: 1 % (ref 0–1)
BILIRUB SERPL-MCNC: 0.37 MG/DL (ref 0.2–1)
BUN SERPL-MCNC: 13 MG/DL (ref 5–25)
CALCIUM SERPL-MCNC: 8.9 MG/DL (ref 8.4–10.2)
CHLORIDE SERPL-SCNC: 108 MMOL/L (ref 96–108)
CO2 SERPL-SCNC: 23 MMOL/L (ref 21–32)
CREAT SERPL-MCNC: 0.79 MG/DL (ref 0.6–1.3)
EOSINOPHIL # BLD AUTO: 0.08 THOUSAND/ÂΜL (ref 0–0.61)
EOSINOPHIL NFR BLD AUTO: 1 % (ref 0–6)
ERYTHROCYTE [DISTWIDTH] IN BLOOD BY AUTOMATED COUNT: 19.8 % (ref 11.6–15.1)
GFR SERPL CREATININE-BSD FRML MDRD: 94 ML/MIN/1.73SQ M
GLUCOSE SERPL-MCNC: 88 MG/DL (ref 65–140)
HCT VFR BLD AUTO: 33.8 % (ref 34.8–46.1)
HGB BLD-MCNC: 10.3 G/DL (ref 11.5–15.4)
IMM GRANULOCYTES # BLD AUTO: 0.01 THOUSAND/UL (ref 0–0.2)
IMM GRANULOCYTES NFR BLD AUTO: 0 % (ref 0–2)
LYMPHOCYTES # BLD AUTO: 1.8 THOUSANDS/ÂΜL (ref 0.6–4.47)
LYMPHOCYTES NFR BLD AUTO: 26 % (ref 14–44)
MCH RBC QN AUTO: 20.4 PG (ref 26.8–34.3)
MCHC RBC AUTO-ENTMCNC: 30.5 G/DL (ref 31.4–37.4)
MCV RBC AUTO: 67 FL (ref 82–98)
MONOCYTES # BLD AUTO: 0.4 THOUSAND/ÂΜL (ref 0.17–1.22)
MONOCYTES NFR BLD AUTO: 6 % (ref 4–12)
NEUTROPHILS # BLD AUTO: 4.73 THOUSANDS/ÂΜL (ref 1.85–7.62)
NEUTS SEG NFR BLD AUTO: 66 % (ref 43–75)
NRBC BLD AUTO-RTO: 0 /100 WBCS
PLATELET # BLD AUTO: 303 THOUSANDS/UL (ref 149–390)
POTASSIUM SERPL-SCNC: 3.9 MMOL/L (ref 3.5–5.3)
PROT SERPL-MCNC: 7.4 G/DL (ref 6.4–8.4)
RBC # BLD AUTO: 5.04 MILLION/UL (ref 3.81–5.12)
SODIUM SERPL-SCNC: 138 MMOL/L (ref 135–147)
WBC # BLD AUTO: 7.06 THOUSAND/UL (ref 4.31–10.16)

## 2023-07-31 PROCEDURE — 85025 COMPLETE CBC W/AUTO DIFF WBC: CPT | Performed by: PSYCHIATRY & NEUROLOGY

## 2023-07-31 PROCEDURE — 96365 THER/PROPH/DIAG IV INF INIT: CPT

## 2023-07-31 PROCEDURE — 80053 COMPREHEN METABOLIC PANEL: CPT | Performed by: PSYCHIATRY & NEUROLOGY

## 2023-07-31 PROCEDURE — 96367 TX/PROPH/DG ADDL SEQ IV INF: CPT

## 2023-07-31 RX ORDER — SODIUM CHLORIDE 9 MG/ML
20 INJECTION, SOLUTION INTRAVENOUS ONCE
Status: COMPLETED | OUTPATIENT
Start: 2023-07-31 | End: 2023-07-31

## 2023-07-31 RX ORDER — SODIUM CHLORIDE 9 MG/ML
20 INJECTION, SOLUTION INTRAVENOUS ONCE
OUTPATIENT
Start: 2023-08-28

## 2023-07-31 RX ORDER — ACETAMINOPHEN 325 MG/1
650 TABLET ORAL ONCE
Status: COMPLETED | OUTPATIENT
Start: 2023-07-31 | End: 2023-07-31

## 2023-07-31 RX ORDER — ACETAMINOPHEN 325 MG/1
650 TABLET ORAL ONCE
OUTPATIENT
Start: 2023-08-28 | End: 2023-08-28

## 2023-07-31 RX ADMIN — NATALIZUMAB 300 MG: 300 INJECTION INTRAVENOUS at 14:14

## 2023-07-31 RX ADMIN — ACETAMINOPHEN 650 MG: 325 TABLET, FILM COATED ORAL at 13:35

## 2023-07-31 RX ADMIN — SODIUM CHLORIDE 20 ML/HR: 0.9 INJECTION, SOLUTION INTRAVENOUS at 13:35

## 2023-07-31 RX ADMIN — DIPHENHYDRAMINE HYDROCHLORIDE 50 MG: 50 INJECTION, SOLUTION INTRAMUSCULAR; INTRAVENOUS at 13:36

## 2023-07-31 NOTE — PROGRESS NOTES
Patient tolerated remainder of observation well without any adverse events. Patient aware of next appointment, PIV removed, AVS declined.

## 2023-07-31 NOTE — PROGRESS NOTES
Pt presents for iv tysabri, denies recent illness/infection. Pt tolerated infusion, pt on 1 hour post observation. Report provided to Yareli Mahoney.

## 2023-08-04 ENCOUNTER — TELEPHONE (OUTPATIENT)
Dept: NEUROLOGY | Facility: CLINIC | Age: 38
End: 2023-08-04

## 2023-08-04 NOTE — TELEPHONE ENCOUNTER
Have her increase Gabapentin to 600 mg at bedtime and baclofen can be increased further to 10 mg TID with an additional 1-2 tabs as needed for breakthrough cramping Max 50 mg/daily. She should also ensure she is remaining well hydrated and should update us on Monday with how she is doing. ER over the weekend for any acute or intolerable symptoms.

## 2023-08-04 NOTE — TELEPHONE ENCOUNTER
Pt had 1 g IV steroids 7/20 and then received Tysabri 7/31. What infusion is she going for on Monday? Confirm if she is taking her gabapentin and using her baclofen? Agree with ER evaluation over the weekend if severe or intolerable.

## 2023-08-04 NOTE — TELEPHONE ENCOUNTER
Pt had Tysabri on 7/31/23, no infusion scheduled for Monday. Next Tysabri scheduled for 8/28/23. Spoke with pt who confirmed the above. She reports she is taking gabapentin 300mg HS (cannot take during the day as it makes her too sleepy). Taking baclofen 10mg TID. Pt aware of ER recommendation. Pt notes her symptoms started prior to Tysabri infusion. After infusion tingling continued. Initially started in right hand, then was in both hands. Now experiencing tingling in left hand. Cramping in her left hand started yesterday. States it is very uncomfortable. Pt also discussed difficulties with her MS diagnosis. Provided support.  Pt notes she has a great relationship with her therapist.

## 2023-08-04 NOTE — TELEPHONE ENCOUNTER
David, my name is Shikha Tavares. My birthday is [birth year]. I am just calling. I need to talk to somebody Daune Clint I just got my infusion on Monday. And today or yesterday, my hands are feels its about to  cramp up more so my left one. So I'm not sure what is going on with my hand. If I just got the infusion,  i should not feel like this. Anyway. If you can give me a call back at [phone], please. And thank you. Call returned to pt. Acknowledge vm. Pt states that she had infusion this past Monday. She is experiencing some numbness and tingling all day yesterday and today the tingling went up to her elbow. No numbness. States that it feels cramping up so she just try to wiggle it. Her left arm feels heavy. No other symptoms. States that her next infusion is scheduled on Monday. Advised pt that our office is currently closed and cannot guarantee that Archana will respond to this message today. Advised pt best ER eval if her symptoms are worsening and bothersome. She verbalized understanding. She is also aware that she can call our office anytime as we always have provider on call.

## 2023-08-08 NOTE — TELEPHONE ENCOUNTER
Pt made aware, she verbalizes understanding and is agreeable. She will call with update on how she is feeling after adjusting her medications as directed.

## 2023-08-10 ENCOUNTER — TELEPHONE (OUTPATIENT)
Dept: HEMATOLOGY ONCOLOGY | Facility: CLINIC | Age: 38
End: 2023-08-10

## 2023-08-10 NOTE — TELEPHONE ENCOUNTER
Appointment Schedule   Who are you speaking with? Patient   If it is not the patient, are they listed on an active communication consent form? Yes   Which provider is the appointment scheduled with? Yvonne Almendarez PA-C   At which location is the appointment scheduled for? [facility]   When is the appointment scheduled? Please list date and time 8/15/23   What is the reason for this appointment? Rescheduling    Did patient voice understanding of the details of this appointment? Yes   Was the no show policy reviewed with patient?  Yes

## 2023-08-18 ENCOUNTER — TELEPHONE (OUTPATIENT)
Dept: NEUROLOGY | Facility: CLINIC | Age: 38
End: 2023-08-18

## 2023-08-18 DIAGNOSIS — G35 MS (MULTIPLE SCLEROSIS) (HCC): Primary | ICD-10-CM

## 2023-08-18 NOTE — TELEPHONE ENCOUNTER
Pt is scheduled for Tysabri on 8/28/23. Last infusion 7/31/23, infusions every 4 weeks. Confirmed scheduling is correct. Last CBC/CMP 7/31/23  Last JCV 2/1/23 0.18 (negative)  Last MRI 5/28/23    Updated JCV level needed now. Order entered. [employer] message sent to pt. Okay to proceed with infusion? Tysabri is approved with P from 6/30/23 to 12/30/23 at [facility] [place]. Auth # [id number]. Touch auth valid until 1/29/24 at [facility] [place].

## 2023-08-25 ENCOUNTER — APPOINTMENT (OUTPATIENT)
Age: 38
End: 2023-08-25
Payer: COMMERCIAL

## 2023-08-25 DIAGNOSIS — G35 MS (MULTIPLE SCLEROSIS) (HCC): ICD-10-CM

## 2023-08-25 LAB
BASOPHILS # BLD AUTO: 0.04 THOUSANDS/ÂΜL (ref 0–0.1)
BASOPHILS NFR BLD AUTO: 1 % (ref 0–1)
EOSINOPHIL # BLD AUTO: 0.23 THOUSAND/ÂΜL (ref 0–0.61)
EOSINOPHIL NFR BLD AUTO: 3 % (ref 0–6)
ERYTHROCYTE [DISTWIDTH] IN BLOOD BY AUTOMATED COUNT: 20.1 % (ref 11.6–15.1)
FERRITIN SERPL-MCNC: 5 NG/ML (ref 11–307)
HCT VFR BLD AUTO: 36.5 % (ref 34.8–46.1)
HGB BLD-MCNC: 10.8 G/DL (ref 11.5–15.4)
IMM GRANULOCYTES # BLD AUTO: 0.03 THOUSAND/UL (ref 0–0.2)
IMM GRANULOCYTES NFR BLD AUTO: 0 % (ref 0–2)
IRON SATN MFR SERPL: 6 % (ref 15–50)
IRON SERPL-MCNC: 23 UG/DL (ref 50–212)
LYMPHOCYTES # BLD AUTO: 3.1 THOUSANDS/ÂΜL (ref 0.6–4.47)
LYMPHOCYTES NFR BLD AUTO: 38 % (ref 14–44)
MCH RBC QN AUTO: 20.4 PG (ref 26.8–34.3)
MCHC RBC AUTO-ENTMCNC: 29.6 G/DL (ref 31.4–37.4)
MCV RBC AUTO: 69 FL (ref 82–98)
MONOCYTES # BLD AUTO: 0.55 THOUSAND/ÂΜL (ref 0.17–1.22)
MONOCYTES NFR BLD AUTO: 7 % (ref 4–12)
NEUTROPHILS # BLD AUTO: 4.26 THOUSANDS/ÂΜL (ref 1.85–7.62)
NEUTS SEG NFR BLD AUTO: 51 % (ref 43–75)
NRBC BLD AUTO-RTO: 0 /100 WBCS
PLATELET # BLD AUTO: 273 THOUSANDS/UL (ref 149–390)
RBC # BLD AUTO: 5.29 MILLION/UL (ref 3.81–5.12)
TIBC SERPL-MCNC: 391 UG/DL (ref 250–450)
UIBC SERPL-MCNC: 368 UG/DL (ref 155–355)
WBC # BLD AUTO: 8.21 THOUSAND/UL (ref 4.31–10.16)

## 2023-08-25 PROCEDURE — 36415 COLL VENOUS BLD VENIPUNCTURE: CPT

## 2023-08-25 PROCEDURE — 83550 IRON BINDING TEST: CPT

## 2023-08-25 PROCEDURE — 85025 COMPLETE CBC W/AUTO DIFF WBC: CPT

## 2023-08-25 PROCEDURE — 83540 ASSAY OF IRON: CPT

## 2023-08-25 PROCEDURE — 82728 ASSAY OF FERRITIN: CPT

## 2023-08-25 NOTE — TELEPHONE ENCOUNTER
Received VM transcription:    Hello, my name is Stephen Garcia and I  I'm just calling because I was doing PT. But for the past 2 weeks, I wasn't able to go to PT because I was having cramping in my hands and stuff like that. But I get my next infusion on Monday and I'll be ready to go back on Tuesday. I spoke with a lady already and I let her know that I was getting my next infusion. And I should feel better because I'm getting better as far as my hands not hurting blah, blah, blah. But she wanted me to get a letter from you guys stating that it is okay for me to go back on Tuesday. She doesn't mind, she said that's fine, but just get a letter stating that you can come back. And so that's pretty much it. If you can give me a call back at [phone]. That'd be greatly appreciated. Bye.  -----------------------------------------------    Spoke with pt and she says didn't go to PT for about 2 weeks now. Says hands were still tingling and cramping and stuff like that. She states that she knows the infusion is going to help her with this and would like to continue PT as it is therapeutic physically bit also mentally. Name of  at PT office is Sirisha Peralta. Can fax letter stating that it's ok to go back to get more PT done. Pt does not have fax number on hand but will find it and send via [employer] message. Asked about JCV lab and pt says she is on her way out the door to get that lab done today. Advised her of Carleen's message to pt that JCV labs can take up to 10 to 14 days. Pt says it has been difficult for her to get away to do this with her kids getting ready for school. Pt apologizes that she did not get it done sooner. Archana - Ok to generate letter? Please advise.

## 2023-08-27 DIAGNOSIS — D64.9 ANEMIA: Primary | ICD-10-CM

## 2023-08-28 ENCOUNTER — TELEPHONE (OUTPATIENT)
Dept: HEMATOLOGY ONCOLOGY | Facility: CLINIC | Age: 38
End: 2023-08-28

## 2023-08-28 ENCOUNTER — HOSPITAL ENCOUNTER (OUTPATIENT)
Dept: INFUSION CENTER | Facility: CLINIC | Age: 38
Discharge: HOME | End: 2023-08-28
Payer: COMMERCIAL

## 2023-08-28 VITALS
DIASTOLIC BLOOD PRESSURE: 57 MMHG | HEART RATE: 65 BPM | SYSTOLIC BLOOD PRESSURE: 119 MMHG | RESPIRATION RATE: 18 BRPM | TEMPERATURE: 97.4 F

## 2023-08-28 DIAGNOSIS — G35 MS (MULTIPLE SCLEROSIS) (HCC): Primary | ICD-10-CM

## 2023-08-28 LAB
ALBUMIN SERPL BCP-MCNC: 4 G/DL (ref 3.5–5)
ALP SERPL-CCNC: 36 U/L (ref 34–104)
ALT SERPL W P-5'-P-CCNC: 11 U/L (ref 7–52)
ANION GAP SERPL CALCULATED.3IONS-SCNC: 4 MMOL/L
AST SERPL W P-5'-P-CCNC: 10 U/L (ref 13–39)
BASOPHILS # BLD AUTO: 0.05 THOUSANDS/ÂΜL (ref 0–0.1)
BASOPHILS NFR BLD AUTO: 1 % (ref 0–1)
BILIRUB SERPL-MCNC: 0.29 MG/DL (ref 0.2–1)
BUN SERPL-MCNC: 16 MG/DL (ref 5–25)
CALCIUM SERPL-MCNC: 9 MG/DL (ref 8.4–10.2)
CHLORIDE SERPL-SCNC: 107 MMOL/L (ref 96–108)
CO2 SERPL-SCNC: 27 MMOL/L (ref 21–32)
CREAT SERPL-MCNC: 0.82 MG/DL (ref 0.6–1.3)
EOSINOPHIL # BLD AUTO: 0.26 THOUSAND/ÂΜL (ref 0–0.61)
EOSINOPHIL NFR BLD AUTO: 4 % (ref 0–6)
ERYTHROCYTE [DISTWIDTH] IN BLOOD BY AUTOMATED COUNT: 19.5 % (ref 11.6–15.1)
GFR SERPL CREATININE-BSD FRML MDRD: 90 ML/MIN/1.73SQ M
GLUCOSE SERPL-MCNC: 80 MG/DL (ref 65–140)
HCT VFR BLD AUTO: 33.3 % (ref 34.8–46.1)
HGB BLD-MCNC: 9.9 G/DL (ref 11.5–15.4)
IMM GRANULOCYTES # BLD AUTO: 0.01 THOUSAND/UL (ref 0–0.2)
IMM GRANULOCYTES NFR BLD AUTO: 0 % (ref 0–2)
LYMPHOCYTES # BLD AUTO: 2.8 THOUSANDS/ÂΜL (ref 0.6–4.47)
LYMPHOCYTES NFR BLD AUTO: 40 % (ref 14–44)
MCH RBC QN AUTO: 20 PG (ref 26.8–34.3)
MCHC RBC AUTO-ENTMCNC: 29.7 G/DL (ref 31.4–37.4)
MCV RBC AUTO: 67 FL (ref 82–98)
MONOCYTES # BLD AUTO: 0.52 THOUSAND/ÂΜL (ref 0.17–1.22)
MONOCYTES NFR BLD AUTO: 7 % (ref 4–12)
NEUTROPHILS # BLD AUTO: 3.35 THOUSANDS/ÂΜL (ref 1.85–7.62)
NEUTS SEG NFR BLD AUTO: 48 % (ref 43–75)
NRBC BLD AUTO-RTO: 0 /100 WBCS
PLATELET # BLD AUTO: 258 THOUSANDS/UL (ref 149–390)
POTASSIUM SERPL-SCNC: 3.7 MMOL/L (ref 3.5–5.3)
PROT SERPL-MCNC: 7.1 G/DL (ref 6.4–8.4)
RBC # BLD AUTO: 4.96 MILLION/UL (ref 3.81–5.12)
SODIUM SERPL-SCNC: 138 MMOL/L (ref 135–147)
WBC # BLD AUTO: 6.99 THOUSAND/UL (ref 4.31–10.16)

## 2023-08-28 PROCEDURE — 80053 COMPREHEN METABOLIC PANEL: CPT | Performed by: PSYCHIATRY & NEUROLOGY

## 2023-08-28 PROCEDURE — 96367 TX/PROPH/DG ADDL SEQ IV INF: CPT

## 2023-08-28 PROCEDURE — 85025 COMPLETE CBC W/AUTO DIFF WBC: CPT | Performed by: PSYCHIATRY & NEUROLOGY

## 2023-08-28 PROCEDURE — 96365 THER/PROPH/DIAG IV INF INIT: CPT

## 2023-08-28 RX ORDER — ACETAMINOPHEN 325 MG/1
650 TABLET ORAL ONCE
Status: COMPLETED | OUTPATIENT
Start: 2023-08-28 | End: 2023-08-28

## 2023-08-28 RX ORDER — ACETAMINOPHEN 325 MG/1
650 TABLET ORAL ONCE
OUTPATIENT
Start: 2023-09-25 | End: 2023-09-25

## 2023-08-28 RX ORDER — SODIUM CHLORIDE 9 MG/ML
20 INJECTION, SOLUTION INTRAVENOUS ONCE
OUTPATIENT
Start: 2023-09-25

## 2023-08-28 RX ORDER — SODIUM CHLORIDE 9 MG/ML
20 INJECTION, SOLUTION INTRAVENOUS ONCE
Status: COMPLETED | OUTPATIENT
Start: 2023-08-28 | End: 2023-08-28

## 2023-08-28 RX ADMIN — SODIUM CHLORIDE 20 ML/HR: 0.9 INJECTION, SOLUTION INTRAVENOUS at 10:34

## 2023-08-28 RX ADMIN — ACETAMINOPHEN 650 MG: 325 TABLET, FILM COATED ORAL at 10:34

## 2023-08-28 RX ADMIN — NATALIZUMAB 300 MG: 300 INJECTION INTRAVENOUS at 11:12

## 2023-08-28 RX ADMIN — DIPHENHYDRAMINE HYDROCHLORIDE 50 MG: 50 INJECTION, SOLUTION INTRAMUSCULAR; INTRAVENOUS at 10:35

## 2023-08-28 NOTE — PROGRESS NOTES
Pt presents for Tysabri infusion offering no compliants. Pre infusion checklist complete. Labs drawn prior to treatment. Patient stated no recent infection or antibiotic treatment. Pt tolerated treatment without incident. Pt waited 30mins post infusion and then chose to leave. Pt tolerated 30min observation without difficulty. AVS declined. Next appointment reviewed.

## 2023-08-28 NOTE — TELEPHONE ENCOUNTER
Appointment Schedule   Who are you speaking with? Patient   If it is not the patient, are they listed on an active communication consent form? N/A   Which provider is the appointment scheduled with? Yvonne Almendarez PA-C   At which location is the appointment scheduled for? [facility]   When is the appointment scheduled? Please list date and time 9/12/23 2pm   What is the reason for this appointment? consult   Did patient voice understanding of the details of this appointment? Yes   Was the no show policy reviewed with patient?  Yes

## 2023-09-01 ENCOUNTER — APPOINTMENT (OUTPATIENT)
Age: 38
End: 2023-09-01
Payer: COMMERCIAL

## 2023-09-01 DIAGNOSIS — G35 MS (MULTIPLE SCLEROSIS) (HCC): ICD-10-CM

## 2023-09-01 PROCEDURE — 36415 COLL VENOUS BLD VENIPUNCTURE: CPT

## 2023-09-01 PROCEDURE — 86711 JOHN CUNNINGHAM ANTIBODY: CPT

## 2023-09-11 NOTE — PROGRESS NOTES
HEMATOLOGY CLINIC NOTE    Primary Care Provider: Luciana Jauregui MD  Referring Provider: Vero Bray  MRN: [id number]  : 1985    Assessment / Plan:   1. Iron deficiency anemia  This is a 43-year-old female with a history of iron deficiency anemia since at least 2021. She admits to not eating high iron foods and also has menorrhagia that is slight with regular cycles. However, I do not feel the proceeding history could explain degree of anemia. She does not have any other history that points to etiology of iron deficiency anemia. Her most recent labs are as follows- 2023: WBC 6.99, Hgb 9.9, MCV 67, MCH 20, MCHC 29.7, PLT 258K, diff normal. Ferritin 5, iron 23, TIBC 391, iron sat 6%. CMP unrevealing. She denies any bleeding anywhere. Patient will have a fecal occult blood test, urine analysis, celiac disease testing today. She will also be referred to GI as I suspect she will need further work-up. She will be sent for Venofer 300 mg x 4. Repeat CBC, iron panel in 3 months with a follow-up then. - Ambulatory Referral to Hematology / Oncology  - CBC and differential; Future  - Iron Panel (Includes Ferritin, Iron Sat%, Iron, and TIBC); Future  - Occult Blood, Fecal Immunochemical; Future  - UA w Reflex to Microscopic w Reflex to Culture; Future  - Celiac Disease Antibody Profile; Future  - Ambulatory referral to Gastroenterology; Future    Patient education provided regarding IV iron. Side effects include but are not limited to the following: hypotension, muscle aches, headache, chills, diarrhea, nausea, lightheadedness, rashes, and very rarely anaphylactic reaction. Patient understands & would still like to proceed with treatment. 2. Microcytosis  Patient had microcytosis even despite having normal hemoglobin in . Likely she has a thalassemia variant. We will test her for beta and alpha thalassemia.     - Hgb Fractionation Cascade;  Future  - MISCELLANEOUS LAB TEST; Future  · Discussion of decision making    I personally reviewed the following lab results, the image studies, pathology, other specialty/physicians consult notes and recommendations, and outside medical records from [address] [place] [address]. I had a lengthy discussion with the patient and shared the work-up findings. I spent 55 minutes reviewing the records (labs, clinician notes, outside records, medical history, ordering medicine/tests/procedures, interpreting the imaging/labs previously done) and coordination of care as well as direct time with the patient today, of which greater than 50% of the time was spent in counseling and coordination of care with the patient/family. · Plan/Labs  · FOBT, celiac, U/A now. · Hgb fract cascade, DNA alpha thalassemia testing now  · CBC-D, iron panel in 3 months  · Venofer 300mg x 4     Follow Up: 3 months     All questions were answered to the patient's satisfaction during this encounter. The patient knows the contact information for our office and knows to reach out for any relevant concerns related to this encounter. They are to call for any temperature 100.4 or higher, new symptoms including but not restricted to shaking chills, decreased appetite, nausea, vomiting, diarrhea, increased fatigue, shortness of breath or chest pain, confusion, and not feeling the strength to come to the clinic. For all other listed problems and medical diagnosis in their chart - they are managed by PCP and/or other specialists, which the patient acknowledges. Thank you very much for your consultation and making us a part of this patient's care. We are continuing to follow closely with you. Please do not hesitate to reach out to me with any additional questions or concerns. Reason for visit:       Chief Complaint   Patient presents with   • Consult     Iron deficiency anemia       History of Hematology Illness:     Yenifer Little is a 43 y.o. female who came in for consultation.     1. Iron Deficiency Anemia without clear cause  - Possible etiologies: menorrhagia with regular cycle + low iron intake. However, history does not appear strong enough to explain degree of anemia. - not a vegan or vegetarian. No malabsorptive conditions known. Menstrual cycle is 5 days with 1st 2 days heaviest. She uses a tampon + liner and changes Q2 hours on heavier days. No bleeding into urine or stools. No blood transfusion hx, IV iron hx. Not on PPI. No hiatal hernia hx.    - anemia present since at least 7/2021.   - 8/2023: WBC 6.99, Hgb 9.9, MCV 67, MCH 20, MCHC 29.7, PLT 258K, diff normal. Ferritin 5, iron 23, TIBC 391, iron sat 6%. CMP unrevealing. 2. History of MS  On infusions Tysabri. [facility]  neurology. Interval History:   8/8/2023: This is a 43year old female with a PMH of patellofemoral syndrome, MS, weakness, LINDA who presents for consultation. Patient has fatigue, lightheadedness, dizziness, shortness of breath with exertion. No bleeding into urine or stools. No dark stools. No PICA. She does not smoke or drink. Currently not working due to [address] [employer] history with flare ups. No cancer hx. No FHx cancer. Patient is currently grieving as father suddenly passed. Emotional support provided to patient. Problem list:       Patient Active Problem List   Diagnosis   • Patellofemoral syndrome, right   • MS (multiple sclerosis) (HCC)   • Weakness   • Depression with anxiety   • Iron deficiency anemia       REVIEW OF SYMPTOMS:   Review of Systems   Constitutional: Positive for fatigue. Negative for activity change, appetite change and unexpected weight change. Respiratory: Negative for cough and shortness of breath (with exertion occassionally). Cardiovascular: Negative for chest pain, palpitations and leg swelling. Gastrointestinal: Negative for abdominal pain, anal bleeding, blood in stool, constipation, diarrhea, nausea and vomiting. Endocrine: Negative for cold intolerance.    Genitourinary: Positive for menstrual problem. Negative for hematuria and vaginal bleeding. Skin: Negative for color change, pallor and rash. Neurological: Positive for dizziness and light-headedness. Negative for syncope and headaches. Hematological: Negative for adenopathy. Does not bruise/bleed easily. Psychiatric/Behavioral: Negative for sleep disturbance. PHYSICAL EXAMINATION:     Vital Signs:   /78   Pulse 55   Temp 98.5 °F (36.9 °C) (Temporal)   Resp 16   Ht 5' 1.5" (1.562 m)   Wt 62.6 kg (138 lb)   LMP 08/24/2023   SpO2 100%   BMI 25.65 kg/m²   Body surface area is 1.62 meters squared. Ht Readings from Last 8 Encounters:   09/12/23 5' 1.5" (1.562 m)   06/13/23 5' 1" (1.549 m)   06/15/22 5' 1" (1.549 m)   05/24/22 5' 1" (1.549 m)   04/25/22 5' 1" (1.549 m)   08/05/21 5' 1" (1.549 m)   07/13/21 5' 1" (1.549 m)   05/21/21 5' 4" (1.626 m)       Wt Readings from Last 8 Encounters:   09/12/23 62.6 kg (138 lb)   06/13/23 61.2 kg (135 lb)   06/15/22 59 kg (130 lb)   05/24/22 60.3 kg (133 lb)   05/09/22 61 kg (134 lb 6.4 oz)   04/25/22 62.6 kg (137 lb 14.4 oz)   08/05/21 62.4 kg (137 lb 9.6 oz)   07/13/21 62.1 kg (137 lb)          Physical Exam  Constitutional:       General: She is not in acute distress. Appearance: Normal appearance. She is not ill-appearing, toxic-appearing or diaphoretic. HENT:      Head: Normocephalic and atraumatic. Eyes:      General: No scleral icterus. Extraocular Movements: Extraocular movements intact. Pupils: Pupils are equal, round, and reactive to light. Comments: Conjunctival pallor   Cardiovascular:      Rate and Rhythm: Normal rate and regular rhythm. Pulmonary:      Effort: Pulmonary effort is normal. No respiratory distress. Musculoskeletal:         General: No swelling or tenderness. Normal range of motion. Cervical back: Normal range of motion and neck supple. Right lower leg: No edema. Left lower leg: No edema.    Skin: General: Skin is warm and dry. Coloration: Skin is not jaundiced or pale. Findings: No bruising, erythema, lesion or rash. Neurological:      General: No focal deficit present. Mental Status: She is alert and oriented to person, place, and time. Mental status is at baseline. Motor: No weakness. Psychiatric:         Mood and Affect: Mood normal.         Behavior: Behavior normal.         Thought Content: Thought content normal.         Judgment: Judgment normal.         Reviewed historical information.       PAST MEDICAL HISTORY:    Past Medical History:   Diagnosis Date   • Anxiety    • Depression    • Multiple sclerosis ([address])        PAST SURGICAL HISTORY:    Past Surgical History:   Procedure Laterality Date   •  SECTION     • IR LUMBAR PUNCTURE  2021         CURRENT MEDICATIONS:     Current Outpatient Medications:   •  baclofen 10 mg tablet, Take 1 tablet (10 mg total) by mouth 3 (three) times a day, Disp: 90 tablet, Rfl: 3  •  escitalopram (LEXAPRO) 10 mg tablet, Take 10 mg by mouth daily, Disp: , Rfl:   •  famotidine (PEPCID) 20 mg tablet, Take 20 mg by mouth every evening, Disp: , Rfl:   •  fluticasone (FLONASE) 50 mcg/act nasal spray, 2 sprays into each nostril daily, Disp: , Rfl:   •  gabapentin (NEURONTIN) 300 mg capsule, Take 1 capsule (300 mg total) by mouth 3 (three) times a day, Disp: 90 capsule, Rfl: 3  •  meloxicam (MOBIC) 7.5 mg tablet, Take 1 tablet (7.5 mg total) by mouth daily, Disp: 30 tablet, Rfl: 0  •  venlafaxine (EFFEXOR-XR) 75 mg 24 hr capsule, Take 75 mg by mouth daily, Disp: , Rfl:   •  lidocaine (LIDODERM) 5 %, Apply 1 patch topically daily as needed (R. Knee pain) Remove & Discard patch within 12 hours or as directed by MD, Disp: 15 patch, Rfl: 0  •  Multiple Vitamins-Minerals (MULTI-B-PLUS PO), Take by mouth daily (Patient not taking: Reported on 2023), Disp: , Rfl:     SOCIAL HISTORY:    Social History     Tobacco Use   • Smoking status: Former     Packs/day: 0.25     Years: 10.00     Total pack years: 2.50     Types: Cigarettes     Start date: 7/15/1999     Quit date: 2011     Years since quittin.1   • Smokeless tobacco: Never   Vaping Use   • Vaping Use: Never used   Substance Use Topics   • Alcohol use: Not Currently     Comment: socially with mom   • Drug use: Yes     Types: Marijuana     Comment: not medical       FAMILY HISTORY:  History reviewed. No pertinent family history. ALLERGIES:    Allergies   Allergen Reactions   • Pollen Extract Nasal Congestion       Lab Results   Component Value Date    SODIUM 138 2023    K 3.7 2023     2023    CO2 27 2023    AGAP 4 2023    BUN 16 2023    CREATININE 0.82 2023    GLUC 80 2023    CALCIUM 9.0 2023    AST 10 (L) 2023    ALT 11 2023    ALKPHOS 36 2023    TP 7.1 2023    TBILI 0.29 2023    EGFR 90 2023       IMAGING:  MRI cervical spine with and without contrast  Narrative: MRI CERVICAL SPINE WITH AND WITHOUT CONTRAST    INDICATION: G35: Multiple sclerosis. COMPARISON: MR cervical spine 2021    TECHNIQUE:  Multiplanar, multisequence imaging of the cervical spine was performed before and after gadolinium administration. .    IV Contrast:  5.5 mL of Gadobutrol injection (SINGLE-DOSE)    IMAGE QUALITY:  Diagnostic. FINDINGS:    ALIGNMENT: There is reversal of cervical lordosis with apex at C4-5. MARROW SIGNAL: Multilevel endplate degenerative change. No suspicious discrete lesion. CERVICAL AND VISUALIZED UPPER THORACIC CORD: Stable multiple demyelinating foci of cord T2 signal abnormality involving: Right anterolateral medulla oblongata/cord at the craniocervical junction, dorsal cord at level C2-3, ventral cord at C4, possible   right cord at C5, and tiny focus at dorsal level C7-T1.  Previously noted small signal abnormality in the left cord at C7 is less conspicuous in current exam. No abnormal enhancement. PREVERTEBRAL AND PARASPINAL SOFT TISSUES:  Normal.    VISUALIZED POSTERIOR FOSSA:  The visualized posterior fossa demonstrates no abnormal signal.    CERVICAL DISC SPACES:    C2-C3:  There is no disc bulge. No canal or foraminal stenosis. C3-C4:  No disc bulge. No canal or foraminal stenosis. C4-C5: Right eccentric disc osteophyte complex. Bilateral uncovertebral spurring and mild facet arthropathy. There is mild canal stenosis. Mild bilateral foraminal narrowing. C5-C6: Right eccentric disc osteophyte complex. Bilateral uncovertebral spurring and mild facet arthropathy. Mild canal narrowing. Moderate to severe right and mild left foraminal narrowing. C6-C7:  Central disc protrusion resulting in mild canal narrowing. No foraminal stenosis. C7-T1:  Unremarkable. UPPER THORACIC DISC SPACES:  Normal.    POSTCONTRAST IMAGING:  Normal.  Impression: 1. Stable cervical spinal cord demyelinating lesions. No new or active demyelinating lesion. 2.  Degenerative change as detailed with multilevel mild canal stenosis pronounced at levels C4-5 and C6-7.  3.  Moderate to severe right foraminal stenosis at C5-6. Correlate for right C6 radiculopathy. Workstation performed: [id number]  MRI cervical spine w wo contrast  Addendum: ADDENDUM:     Correcting cord signal:   Please disregard cord signal at posterior level C1-2. The correct level is  C2-3. Narrative: MRI CERVICAL SPINE WITH AND WITHOUT CONTRAST    INDICATION: Demyelinating lesions. COMPARISON:  None. TECHNIQUE:  Sagittal T1, sagittal T2, sagittal inversion recovery, axial 2D merge and axial T2. Sagittal T1 and axial T1 postcontrast.    IV Contrast:  6 mL of Gadobutrol injection (SINGLE-DOSE)     IMAGE QUALITY:  Diagnostic. FINDINGS:    ALIGNMENT:  Normal alignment of the cervical spine. No compression fracture. No subluxation. No scoliosis.     MARROW SIGNAL:  Normal marrow signal is identified within the visualized bony structures. No discrete marrow lesion. CERVICAL AND VISUALIZED UPPER THORACIC CORD:  Multiple foci of cord T2 signal abnormality involving right anterolateral medulla oblongata/cord at the craniocervical junction, posterior cord at level C1-2, bilateral anterolateral cord at C4, right cord at   C5, left cord at C7 and dorsal cord at C7-T1. There is no associated enhancement. PREVERTEBRAL AND PARASPINAL SOFT TISSUES:  Normal.    VISUALIZED POSTERIOR FOSSA:  The visualized posterior fossa demonstrates no abnormal signal.    CERVICAL DISC SPACES:    C2-C3:  There is no disc bulge. No canal or foraminal stenosis. C3-C4:  No disc bulge. No canal or foraminal stenosis. C4-C5:  There is disc bulge and superimposed right paracentral disc protrusion with marginal osteophytes. Bilateral uncovertebral spurring and mild facet arthropathy. There is mild canal stenosis. Mild bilateral foraminal narrowing. C5-C6:  Mild disc bulge superimposed small central disc protrusion. Bilateral uncovertebral spurring and mild facet arthropathy. Mild canal narrowing. There is mild bilateral foraminal narrowing. C6-C7:  Central disc protrusion resulting in mild canal narrowing. No foraminal stenosis. C7-T1:  Unremarkable. UPPER THORACIC DISC SPACES:  Normal.    POSTCONTRAST IMAGING:  Normal.  Impression: 1. Multifocal cervical spinal cord signal abnormality most consistent with demyelinating disease given findings in brain MRI. No active demyelination. No cord atrophy. 2.  Multilevel mild canal and foraminal narrowing more pronounced at level C4-5. Workstation performed: [id number]  MRI brain MS wo and w contrast  Narrative: MRI BRAIN WITH AND WITHOUT CONTRAST    INDICATION:  G35: Multiple sclerosis. COMPARISON: Brain MRI 7/13/2021    TECHNIQUE:  Multiplanar, multisequence imaging of the brain was performed before and after gadolinium administration.     IV Contrast:  5.5 mL of Gadobutrol injection (SINGLE-DOSE)    IMAGE QUALITY:  Diagnostic. FINDINGS:    BRAIN PARENCHYMA:    Redemonstrated large burden intracranial demyelinating disease with discrete and confluent demyelinating foci predominantly involving juxtacortical and periventricular white matter as well as the brainstem and cerebellum. There are a few new acute/active   enhancing demyelinating lesions including:  - 0.6 cm left frontal centrum semiovale lesion (series 17 image 18)  - 0.5 cm left temporal lobe periventricular white matter lesion (series 17 image 11).  - 0.6 cm right temporal lobe subcortical lesion (series 17 image 12)  - 0.3 cm paramedian posterior left frontal lobe lesion (series 17 image 21).  - 0.3 cm right corona radiata lesion (series 21 image 82)  - 0.2 cm right frontal operculum subcortical lesion (series 21 image 99)    There is new 0.4 cm demyelinating focus in the right middle (series 11 image 112) without enhancement    Redemonstrated multiple T1 hypointense foci (black holes) consistent with axonal loss. No acute infarct. No intracranial hemorrhage. No discrete mass or mass effect. VENTRICLES:  Normal for the patient's age. SELLA AND PITUITARY GLAND:  Normal.    ORBITS:  Normal.    PARANASAL SINUSES:  Normal.    VASCULATURE:  Evaluation of the major intracranial vasculature demonstrates appropriate flow voids. CALVARIUM AND SKULL BASE:  Normal.    EXTRACRANIAL SOFT TISSUES:  Normal.  Impression: Large burden intracranial demyelinating disease with six new acute/active demyelinating foci as described. New 0.4 cm demyelinating lesion in the right medulla without enhancement. The study was marked in [facility] for immediate notification.     Workstation performed: [id number]

## 2023-09-12 ENCOUNTER — TELEPHONE (OUTPATIENT)
Dept: HEMATOLOGY ONCOLOGY | Facility: CLINIC | Age: 38
End: 2023-09-12

## 2023-09-12 ENCOUNTER — CONSULT (OUTPATIENT)
Dept: HEMATOLOGY ONCOLOGY | Facility: CLINIC | Age: 38
End: 2023-09-12
Payer: COMMERCIAL

## 2023-09-12 VITALS
WEIGHT: 138 LBS | DIASTOLIC BLOOD PRESSURE: 78 MMHG | OXYGEN SATURATION: 100 % | HEIGHT: 62 IN | BODY MASS INDEX: 25.4 KG/M2 | RESPIRATION RATE: 16 BRPM | TEMPERATURE: 98.5 F | HEART RATE: 55 BPM | SYSTOLIC BLOOD PRESSURE: 122 MMHG

## 2023-09-12 DIAGNOSIS — R71.8 MICROCYTOSIS: ICD-10-CM

## 2023-09-12 DIAGNOSIS — D50.0 IRON DEFICIENCY ANEMIA DUE TO CHRONIC BLOOD LOSS: ICD-10-CM

## 2023-09-12 DIAGNOSIS — D50.9 IRON DEFICIENCY ANEMIA: Primary | ICD-10-CM

## 2023-09-12 DIAGNOSIS — G35 MS (MULTIPLE SCLEROSIS) (HCC): ICD-10-CM

## 2023-09-12 PROCEDURE — 99205 OFFICE O/P NEW HI 60 MIN: CPT | Performed by: INTERNAL MEDICINE

## 2023-09-12 RX ORDER — VENLAFAXINE HYDROCHLORIDE 75 MG/1
75 CAPSULE, EXTENDED RELEASE ORAL DAILY
COMMUNITY
Start: 2023-09-01 | End: 2024-08-31

## 2023-09-12 RX ORDER — FLUTICASONE PROPIONATE 50 MCG
2 SPRAY, SUSPENSION (ML) NASAL DAILY
COMMUNITY
Start: 2023-07-18

## 2023-09-12 RX ORDER — ESCITALOPRAM OXALATE 10 MG/1
10 TABLET ORAL DAILY
COMMUNITY
Start: 2023-07-11

## 2023-09-12 RX ORDER — SODIUM CHLORIDE 9 MG/ML
20 INJECTION, SOLUTION INTRAVENOUS ONCE
Status: CANCELLED | OUTPATIENT
Start: 2023-09-18

## 2023-09-12 RX ORDER — FAMOTIDINE 20 MG/1
20 TABLET, FILM COATED ORAL EVERY EVENING
COMMUNITY
Start: 2023-07-18

## 2023-09-12 NOTE — TELEPHONE ENCOUNTER
Spoke with patient, confirmed first infusion. Per patient will check mychart as she is currently not home.

## 2023-09-15 ENCOUNTER — TELEPHONE (OUTPATIENT)
Dept: NEUROLOGY | Facility: CLINIC | Age: 38
End: 2023-09-15

## 2023-09-15 LAB — MISCELLANEOUS LAB TEST RESULT: NORMAL

## 2023-09-15 NOTE — TELEPHONE ENCOUNTER
Pt is scheduled for Tysabri on 9/25/23. Last infusion 8/28/23, infusions every 4 weeks. Confirmed scheduling is correct.     Last CBC/CMP 8/28/23  Last JCV 9/1/23 0.23 (final result negative)  Last MRI 5/28/23     Okay to proceed with infusion?     Tysabri is approved with P from 6/30/23 to 12/30/23 at [facility] infusion [place]. Auth # [id number].     Touch auth valid until 1/29/24 at [facility] infusion [place].

## 2023-09-15 NOTE — TELEPHONE ENCOUNTER
Called  Lab link and spoke with Cristy. She reports lab was sent out to [facility]. She will contact them to see if lab result is available.

## 2023-09-21 DIAGNOSIS — G35 MS (MULTIPLE SCLEROSIS) (HCC): ICD-10-CM

## 2023-09-21 DIAGNOSIS — D50.0 IRON DEFICIENCY ANEMIA DUE TO CHRONIC BLOOD LOSS: Primary | ICD-10-CM

## 2023-09-21 RX ORDER — SODIUM CHLORIDE 9 MG/ML
20 INJECTION, SOLUTION INTRAVENOUS ONCE
Status: CANCELLED | OUTPATIENT
Start: 2023-09-25

## 2023-09-25 ENCOUNTER — HOSPITAL ENCOUNTER (OUTPATIENT)
Dept: INFUSION CENTER | Facility: CLINIC | Age: 38
Discharge: HOME | End: 2023-09-25
Payer: COMMERCIAL

## 2023-09-25 ENCOUNTER — TELEPHONE (OUTPATIENT)
Dept: NEUROLOGY | Facility: CLINIC | Age: 38
End: 2023-09-25

## 2023-09-25 DIAGNOSIS — G35 MS (MULTIPLE SCLEROSIS) (HCC): Primary | ICD-10-CM

## 2023-09-25 DIAGNOSIS — D50.0 IRON DEFICIENCY ANEMIA DUE TO CHRONIC BLOOD LOSS: ICD-10-CM

## 2023-09-25 PROCEDURE — 96367 TX/PROPH/DG ADDL SEQ IV INF: CPT

## 2023-09-25 PROCEDURE — 96365 THER/PROPH/DIAG IV INF INIT: CPT

## 2023-09-25 RX ORDER — SODIUM CHLORIDE 9 MG/ML
20 INJECTION, SOLUTION INTRAVENOUS ONCE
Status: COMPLETED | OUTPATIENT
Start: 2023-09-25 | End: 2023-09-25

## 2023-09-25 RX ORDER — ACETAMINOPHEN 325 MG/1
650 TABLET ORAL ONCE
Status: COMPLETED | OUTPATIENT
Start: 2023-09-25 | End: 2023-09-25

## 2023-09-25 RX ORDER — ACETAMINOPHEN 325 MG/1
650 TABLET ORAL ONCE
OUTPATIENT
Start: 2023-10-23 | End: 2023-10-23

## 2023-09-25 RX ORDER — SODIUM CHLORIDE 9 MG/ML
20 INJECTION, SOLUTION INTRAVENOUS ONCE
Status: CANCELLED | OUTPATIENT
Start: 2023-10-02

## 2023-09-25 RX ORDER — SODIUM CHLORIDE 9 MG/ML
20 INJECTION, SOLUTION INTRAVENOUS ONCE
OUTPATIENT
Start: 2023-10-23

## 2023-09-25 RX ADMIN — NATALIZUMAB 300 MG: 300 INJECTION INTRAVENOUS at 11:46

## 2023-09-25 RX ADMIN — IRON SUCROSE 300 MG: 20 INJECTION, SOLUTION INTRAVENOUS at 13:19

## 2023-09-25 RX ADMIN — DIPHENHYDRAMINE HYDROCHLORIDE 50 MG: 50 INJECTION, SOLUTION INTRAMUSCULAR; INTRAVENOUS at 11:07

## 2023-09-25 RX ADMIN — SODIUM CHLORIDE 20 ML/HR: 0.9 INJECTION, SOLUTION INTRAVENOUS at 11:07

## 2023-09-25 RX ADMIN — ACETAMINOPHEN 650 MG: 325 TABLET, FILM COATED ORAL at 11:06

## 2023-09-25 RX ADMIN — SODIUM CHLORIDE 20 ML/HR: 0.9 INJECTION, SOLUTION INTRAVENOUS at 11:09

## 2023-09-25 NOTE — PROGRESS NOTES
Pt to clinic for tysabri and venofer infusion. Tysabri pre checklist completed. Pt offers no complaints today. Tolerated infusion without complications. Pt aware of next appointment. PIV removed. AVS was received by pt.  Pt ambulated out of clinic safely

## 2023-09-26 DIAGNOSIS — G35 MS (MULTIPLE SCLEROSIS) (HCC): ICD-10-CM

## 2023-09-27 RX ORDER — BACLOFEN 10 MG/1
10 TABLET ORAL 3 TIMES DAILY
Qty: 90 TABLET | Refills: 0 | Status: SHIPPED | OUTPATIENT
Start: 2023-09-27

## 2023-10-02 ENCOUNTER — HOSPITAL ENCOUNTER (OUTPATIENT)
Dept: INFUSION CENTER | Facility: CLINIC | Age: 38
Discharge: HOME | End: 2023-10-02
Payer: COMMERCIAL

## 2023-10-02 VITALS
HEART RATE: 77 BPM | TEMPERATURE: 98.6 F | RESPIRATION RATE: 18 BRPM | DIASTOLIC BLOOD PRESSURE: 57 MMHG | SYSTOLIC BLOOD PRESSURE: 112 MMHG

## 2023-10-02 DIAGNOSIS — G35 MS (MULTIPLE SCLEROSIS) (HCC): ICD-10-CM

## 2023-10-02 DIAGNOSIS — D50.0 IRON DEFICIENCY ANEMIA DUE TO CHRONIC BLOOD LOSS: Primary | ICD-10-CM

## 2023-10-02 PROCEDURE — 96365 THER/PROPH/DIAG IV INF INIT: CPT

## 2023-10-02 RX ORDER — SODIUM CHLORIDE 9 MG/ML
20 INJECTION, SOLUTION INTRAVENOUS ONCE
Status: COMPLETED | OUTPATIENT
Start: 2023-10-02 | End: 2023-10-02

## 2023-10-02 RX ORDER — SODIUM CHLORIDE 9 MG/ML
20 INJECTION, SOLUTION INTRAVENOUS ONCE
OUTPATIENT
Start: 2023-10-09

## 2023-10-02 RX ADMIN — IRON SUCROSE 300 MG: 20 INJECTION, SOLUTION INTRAVENOUS at 13:33

## 2023-10-02 RX ADMIN — SODIUM CHLORIDE 20 ML/HR: 9 INJECTION, SOLUTION INTRAVENOUS at 13:32

## 2023-10-02 NOTE — PROGRESS NOTES
Pt presents for venofer infusion offering no complaints. Pt tolerated treatment without incident. PIV removed. AVS printed, next appointment reviewed.

## 2023-10-09 ENCOUNTER — HOSPITAL ENCOUNTER (OUTPATIENT)
Dept: INFUSION CENTER | Facility: CLINIC | Age: 38
Discharge: HOME | End: 2023-10-09
Payer: COMMERCIAL

## 2023-10-09 DIAGNOSIS — G35 MS (MULTIPLE SCLEROSIS) (HCC): ICD-10-CM

## 2023-10-09 DIAGNOSIS — D50.0 IRON DEFICIENCY ANEMIA DUE TO CHRONIC BLOOD LOSS: Primary | ICD-10-CM

## 2023-10-09 PROCEDURE — 96365 THER/PROPH/DIAG IV INF INIT: CPT

## 2023-10-09 RX ORDER — SODIUM CHLORIDE 9 MG/ML
20 INJECTION, SOLUTION INTRAVENOUS ONCE
Status: COMPLETED | OUTPATIENT
Start: 2023-10-09 | End: 2023-10-09

## 2023-10-09 RX ORDER — SODIUM CHLORIDE 9 MG/ML
20 INJECTION, SOLUTION INTRAVENOUS ONCE
Status: CANCELLED | OUTPATIENT
Start: 2023-10-16

## 2023-10-09 RX ADMIN — SODIUM CHLORIDE 20 ML/HR: 9 INJECTION, SOLUTION INTRAVENOUS at 11:50

## 2023-10-09 RX ADMIN — IRON SUCROSE 300 MG: 20 INJECTION, SOLUTION INTRAVENOUS at 11:50

## 2023-10-09 NOTE — PROGRESS NOTES
Pt to clinic for Venofer. Pt offers no complaints. Pt tolerated infusion without complications. PIV removed. Pt aware of next appointment. AVS declined.

## 2023-10-13 ENCOUNTER — TELEPHONE (OUTPATIENT)
Dept: NEUROLOGY | Facility: CLINIC | Age: 38
End: 2023-10-13

## 2023-10-13 NOTE — TELEPHONE ENCOUNTER
Pt is scheduled for Tysabri on 10/23/23. Last infusion 9/25/23, infusions every 4 weeks. Confirmed scheduling is correct. Last CBC/CMP 8/28/23  Last JCV 9/1/23 0.23 (final result negative)  Last MRI 5/28/23     Okay to proceed with infusion? Tysabri is approved with P from 6/30/23 to 12/30/23 at [facility] infusion [place]. Auth # [id number]. Touch auth valid until 1/29/24 at [facility] infusion [place].

## 2023-10-19 DIAGNOSIS — G35 MS (MULTIPLE SCLEROSIS) (HCC): ICD-10-CM

## 2023-10-19 DIAGNOSIS — D50.0 IRON DEFICIENCY ANEMIA DUE TO CHRONIC BLOOD LOSS: Primary | ICD-10-CM

## 2023-10-19 RX ORDER — SODIUM CHLORIDE 9 MG/ML
20 INJECTION, SOLUTION INTRAVENOUS ONCE
OUTPATIENT
Start: 2023-10-23

## 2023-10-23 ENCOUNTER — HOSPITAL ENCOUNTER (OUTPATIENT)
Dept: INFUSION CENTER | Facility: CLINIC | Age: 38
Discharge: HOME | End: 2023-10-23
Payer: COMMERCIAL

## 2023-10-23 VITALS
SYSTOLIC BLOOD PRESSURE: 117 MMHG | TEMPERATURE: 96.7 F | RESPIRATION RATE: 18 BRPM | DIASTOLIC BLOOD PRESSURE: 63 MMHG | HEART RATE: 73 BPM

## 2023-10-23 DIAGNOSIS — G35 MS (MULTIPLE SCLEROSIS) (HCC): Primary | ICD-10-CM

## 2023-10-23 DIAGNOSIS — D50.0 IRON DEFICIENCY ANEMIA DUE TO CHRONIC BLOOD LOSS: ICD-10-CM

## 2023-10-23 PROCEDURE — 96367 TX/PROPH/DG ADDL SEQ IV INF: CPT

## 2023-10-23 PROCEDURE — 96365 THER/PROPH/DIAG IV INF INIT: CPT

## 2023-10-23 RX ORDER — SODIUM CHLORIDE 9 MG/ML
20 INJECTION, SOLUTION INTRAVENOUS ONCE
OUTPATIENT
Start: 2023-11-20

## 2023-10-23 RX ORDER — SODIUM CHLORIDE 9 MG/ML
20 INJECTION, SOLUTION INTRAVENOUS ONCE
Status: COMPLETED | OUTPATIENT
Start: 2023-10-23 | End: 2023-10-23

## 2023-10-23 RX ORDER — SODIUM CHLORIDE 9 MG/ML
20 INJECTION, SOLUTION INTRAVENOUS ONCE
Status: DISCONTINUED | OUTPATIENT
Start: 2023-10-23 | End: 2023-10-26 | Stop reason: HOSPADM

## 2023-10-23 RX ORDER — SODIUM CHLORIDE 9 MG/ML
20 INJECTION, SOLUTION INTRAVENOUS ONCE
Status: CANCELLED | OUTPATIENT
Start: 2023-10-30

## 2023-10-23 RX ORDER — ACETAMINOPHEN 325 MG/1
650 TABLET ORAL ONCE
OUTPATIENT
Start: 2023-11-20 | End: 2023-11-20

## 2023-10-23 RX ORDER — ACETAMINOPHEN 325 MG/1
650 TABLET ORAL ONCE
Status: COMPLETED | OUTPATIENT
Start: 2023-10-23 | End: 2023-10-23

## 2023-10-23 RX ADMIN — NATALIZUMAB 300 MG: 300 INJECTION INTRAVENOUS at 12:52

## 2023-10-23 RX ADMIN — IRON SUCROSE 300 MG: 20 INJECTION, SOLUTION INTRAVENOUS at 10:23

## 2023-10-23 RX ADMIN — SODIUM CHLORIDE 20 ML/HR: 0.9 INJECTION, SOLUTION INTRAVENOUS at 10:20

## 2023-10-23 RX ADMIN — ACETAMINOPHEN 650 MG: 325 TABLET, FILM COATED ORAL at 12:13

## 2023-10-23 RX ADMIN — DIPHENHYDRAMINE HYDROCHLORIDE 50 MG: 50 INJECTION, SOLUTION INTRAMUSCULAR; INTRAVENOUS at 12:13

## 2023-10-23 NOTE — PROGRESS NOTES
Pt to clinic for Venofer and Tysabri. Pt offers no complaints today. Tolerated infusion without complications. Pt stayed for approximately 10 minute observation and declined the rest of the hour. Aware of next appointment. AVS printed. PIV removed.

## 2023-10-30 ENCOUNTER — TELEPHONE (OUTPATIENT)
Dept: NEUROLOGY | Facility: CLINIC | Age: 38
End: 2023-10-30

## 2023-10-30 ENCOUNTER — HOSPITAL ENCOUNTER (OUTPATIENT)
Dept: INFUSION CENTER | Facility: CLINIC | Age: 38
Discharge: HOME | End: 2023-10-30

## 2023-10-30 NOTE — TELEPHONE ENCOUNTER
ISSA 10/27/23 at 14:11:    Patient called in regarding her documentation was sent over for her Housing.  Please call back # [phone]

## 2023-11-10 ENCOUNTER — TELEPHONE (OUTPATIENT)
Dept: NEUROLOGY | Facility: CLINIC | Age: 38
End: 2023-11-10

## 2023-11-10 NOTE — TELEPHONE ENCOUNTER
Pt is scheduled for Tysabri on 11/20/23. Last infusion 10/23/23, infusions every 4 weeks. Confirmed scheduling is correct. Last CBC/CMP 8/28/23  Last JCV 9/1/23 0.23 (final result negative)  Last MRI 5/28/23     Okay to proceed with infusion? Tysabri is approved with P from 6/30/23 to 12/30/23 at [facility] infusion [place]. Auth # [id number]. Touch auth valid until 1/29/24 at [facility] infusion [place].

## 2023-11-20 ENCOUNTER — HOSPITAL ENCOUNTER (OUTPATIENT)
Dept: INFUSION CENTER | Facility: CLINIC | Age: 38
Discharge: HOME | End: 2023-11-20

## 2023-12-05 ENCOUNTER — TELEPHONE (OUTPATIENT)
Age: 38
End: 2023-12-05

## 2023-12-05 NOTE — TELEPHONE ENCOUNTER
Patient contacted office to reschedule appt scheduled for today 12/5. Call got disconnected. Attempted to contact patient back, left message to return call.

## 2023-12-08 ENCOUNTER — TELEPHONE (OUTPATIENT)
Dept: HEMATOLOGY ONCOLOGY | Facility: CLINIC | Age: 38
End: 2023-12-08

## 2023-12-08 ENCOUNTER — TELEPHONE (OUTPATIENT)
Dept: NEUROLOGY | Facility: CLINIC | Age: 38
End: 2023-12-08

## 2023-12-08 NOTE — TELEPHONE ENCOUNTER
Pt is scheduled for Tysabri on 12/18/23. Last infusion 10/23/23, infusions every 4 weeks. Pt cancelled infusion on 11/20/23, no show to appt on 11/21/23. Now scheduled 12/18. Last CBC/CMP 8/28/23  Last JCV 9/1/23 0.23 (final result negative)  Last MRI 5/28/23     Okay to proceed with infusion? Tysabri is approved with P from 6/30/23 to 12/30/23 at [facility] infusion center. Auth # [id number]. Touch auth valid until 1/29/24 at [facility] infusion [place]. Will need new PA after this infusion.

## 2023-12-08 NOTE — TELEPHONE ENCOUNTER
Lev and gave reminder about the appointment scheduled with Dr. Trevor Batres and of labs that should be completed prior to the appointment.

## 2023-12-12 DIAGNOSIS — G35 MS (MULTIPLE SCLEROSIS) (HCC): Primary | ICD-10-CM

## 2023-12-12 RX ORDER — ACETAMINOPHEN 325 MG/1
650 TABLET ORAL ONCE
OUTPATIENT
Start: 2023-12-18 | End: 2023-12-18

## 2023-12-12 RX ORDER — SODIUM CHLORIDE 9 MG/ML
20 INJECTION, SOLUTION INTRAVENOUS ONCE
OUTPATIENT
Start: 2023-12-18

## 2023-12-21 ENCOUNTER — TELEPHONE (OUTPATIENT)
Dept: NEUROLOGY | Facility: CLINIC | Age: 38
End: 2023-12-21

## 2023-12-21 NOTE — TELEPHONE ENCOUNTER
New PA is needed for Tysabri. Next scheduled 1/16/24.    Pt also needs to reschedule her neuro appt. Called pt, she accepted appt with Sara on 1/24/24.    Suki STANLEY completed and faxed. Awaiting determination.

## 2023-12-28 ENCOUNTER — TELEPHONE (OUTPATIENT)
Dept: NEUROLOGY | Facility: CLINIC | Age: 38
End: 2023-12-28

## 2023-12-28 NOTE — TELEPHONE ENCOUNTER
Tysabri TOUCH reauthorization submitted, valid until 7/30/24 at [facility].

## 2023-12-28 NOTE — TELEPHONE ENCOUNTER
[facility] 12[facility] to 2/20/24 at [facility]. Auth # [id number].

## 2024-01-05 ENCOUNTER — TELEPHONE (OUTPATIENT)
Dept: NEUROLOGY | Facility: CLINIC | Age: 39
End: 2024-01-05

## 2024-01-05 DIAGNOSIS — G35 MS (MULTIPLE SCLEROSIS) (HCC): Primary | ICD-10-CM

## 2024-01-05 RX ORDER — ACETAMINOPHEN 325 MG/1
650 TABLET ORAL ONCE
OUTPATIENT
Start: 2024-01-16 | End: 2024-01-16

## 2024-01-05 RX ORDER — SODIUM CHLORIDE 9 MG/ML
20 INJECTION, SOLUTION INTRAVENOUS ONCE
OUTPATIENT
Start: 2024-01-16

## 2024-01-05 NOTE — TELEPHONE ENCOUNTER
Pt is scheduled for Tysabri on 1/16/24.   Last infusion 10/23/23, infusions every 4 weeks.     Pt cancelled infusion on 11/20/23, no show to appt on 11/21/23 and 12/18/23. Now scheduled 1/16/24.     Last CBC/CMP 8/28/23  Last JCV 9/1/23 0.23 (final result negative)  Last MRI 5/28/23     Okay to proceed with infusion?     Tysabri is approved with [email or web address] from 12/22/23 to 2/20/24 at [facility]. Auth # [id number].      Tysabri TOUCH reauthorization submitted, valid until 7/30/24 at [facility].

## 2024-01-16 ENCOUNTER — HOSPITAL ENCOUNTER (OUTPATIENT)
Dept: INFUSION CENTER | Facility: CLINIC | Age: 39
Discharge: HOME | End: 2024-01-16

## 2024-01-24 ENCOUNTER — TELEPHONE (OUTPATIENT)
Dept: NEUROLOGY | Facility: CLINIC | Age: 39
End: 2024-01-24

## 2024-01-24 NOTE — TELEPHONE ENCOUNTER
Called pt to get checked in for virtual visit w/ Archana @2:00. No answer, left message to call back

## 2024-01-24 NOTE — TELEPHONE ENCOUNTER
Pt called in needing virtual for appt today 1/24 @ 2:00 with Archana due to son being home from school sick. Changed appt to virtual [facility].

## 2024-01-25 NOTE — TELEPHONE ENCOUNTER
Left message for pt advising her of need for f/u appt to continue infusions. OV also needed for continued approval with insurance.     Awaiting scheduling.   Will place therapy plan after 1/30/24 infusion.

## 2024-01-25 NOTE — TELEPHONE ENCOUNTER
Pt [facility]'ed her virtual visit. She was last seen 6/13/2023 and was told to follow up in 3 months. She has not been seen since she initiated Tysabri. She is scheduled for next dose 1/30/24. Please place plan on hold after this dose. She needs to be seen in follow up before any further infusions after this next one will be provided.

## 2024-01-26 DIAGNOSIS — G35 MS (MULTIPLE SCLEROSIS) (HCC): Primary | ICD-10-CM

## 2024-01-26 RX ORDER — ACETAMINOPHEN 325 MG/1
650 TABLET ORAL ONCE
Status: CANCELLED | OUTPATIENT
Start: 2024-01-30 | End: 2024-01-30

## 2024-01-26 RX ORDER — SODIUM CHLORIDE 9 MG/ML
20 INJECTION, SOLUTION INTRAVENOUS ONCE
Status: CANCELLED | OUTPATIENT
Start: 2024-01-30

## 2024-01-26 NOTE — TELEPHONE ENCOUNTER
received vm from 1/25 at 11:37am-Refugio, this is Charisse de leon and I'm just returning a call because you guys just called me unlisted. If you can give me a call back at [phone]. No, actually, sorry, is [phone], I'm so sorry, that's my old number. Well anyway. Yeah, thank you. Please, byyaz  [phone]

## 2024-02-01 DIAGNOSIS — G35 MS (MULTIPLE SCLEROSIS) (HCC): Primary | ICD-10-CM

## 2024-02-01 RX ORDER — ACETAMINOPHEN 325 MG/1
650 TABLET ORAL ONCE
OUTPATIENT
Start: 2024-02-13 | End: 2024-02-13

## 2024-02-01 RX ORDER — SODIUM CHLORIDE 9 MG/ML
20 INJECTION, SOLUTION INTRAVENOUS ONCE
OUTPATIENT
Start: 2024-02-13

## 2024-02-01 NOTE — TELEPHONE ENCOUNTER
Can proceed with appt 2/13 but need office visit for any further infusions due to concerns about compliance

## 2024-02-01 NOTE — TELEPHONE ENCOUNTER
Left detailed message for pt again advising of needed appt to continue Tysabri infusions.     Pt did not have infusion as scheduled on 1/30/24. Now scheduled 2/13/24. Can pt proceed with the rescheduled infusion or is an appt required before?

## 2024-02-08 ENCOUNTER — TELEMEDICINE (OUTPATIENT)
Dept: NEUROLOGY | Facility: CLINIC | Age: 39
End: 2024-02-08
Payer: COMMERCIAL

## 2024-02-08 ENCOUNTER — TELEPHONE (OUTPATIENT)
Dept: NEUROLOGY | Facility: CLINIC | Age: 39
End: 2024-02-08

## 2024-02-08 DIAGNOSIS — R25.2 MUSCLE CRAMPING: ICD-10-CM

## 2024-02-08 DIAGNOSIS — G35 MS (MULTIPLE SCLEROSIS) (HCC): Primary | ICD-10-CM

## 2024-02-08 PROCEDURE — 99213 OFFICE O/P EST LOW 20 MIN: CPT

## 2024-02-08 RX ORDER — METRONIDAZOLE 500 MG/1
TABLET ORAL
COMMUNITY
Start: 2023-12-14

## 2024-02-08 RX ORDER — METHOCARBAMOL 750 MG/1
750 TABLET, FILM COATED ORAL EVERY 6 HOURS PRN
Qty: 120 TABLET | Refills: 3 | Status: SHIPPED | OUTPATIENT
Start: 2024-02-08

## 2024-02-08 NOTE — Clinical Note
Her therapy plan hold can be released.  I would like to have her labs drawn at the time of her next infusion if possible.   Sara

## 2024-02-08 NOTE — ASSESSMENT & PLAN NOTE
Charisse Bowman is a 39 year old female with Multiple Sclerosis maintained on Tysabri 300 mg q 4 weeks. She is tolerating this regimen well with no new or worsening neurologic complaints concerning for disease progression. She is overdue for routine CBC/CMP and JCV with index. Orders entered today, we will see if she can have these drawn at the time of her next infusion. Will consider updated MRIs in Summer 2024.    Plan:  Continue Tysabri q 4 weeks  CBC/CMP/ JCV Q 6 months  Follow up in 4-5 months  MRIs in Summer 2024; sooner if needed

## 2024-02-08 NOTE — ASSESSMENT & PLAN NOTE
Charisse continues with significant cramping of her hands and right knee down to her foot. She is using Baclofen 10 mg bid and 20 mg at bedtime and Gabapentin 100 mg bid and 600 mg qhs with only partial relief.    We will plan to discontinue Baclofen at this time and in its place she can trial Methocarbamol 750 mg up to 4 times daily. She should continue on Gabapentin but may consider an increase in dose as tolerated or a transition to Lyrica in the future if needed. She was advised to send me a message on [employer] with an update in a few weeks.

## 2024-02-08 NOTE — PROGRESS NOTES
Virtual Regular Visit    Verification of patient location:    Patient is located at Home in the following state in which I hold an active license [place]      Assessment/Plan:    Problem List Items Addressed This Visit          Nervous and Auditory    MS (multiple sclerosis) (HCC) - Primary     Charisse Bowman is a 39 year old female with Multiple Sclerosis maintained on Tysabri 300 mg q 4 weeks. She is tolerating this regimen well with no new or worsening neurologic complaints concerning for disease progression. She is overdue for routine CBC/CMP and JCV with index. Orders entered today, we will see if she can have these drawn at the time of her next infusion. Will consider updated MRIs in Summer 2024.    Plan:  Continue Tysabri q 4 weeks  CBC/CMP/ JCV Q 6 months  Follow up in 4-5 months  MRIs in Summer 2024; sooner if needed         Relevant Medications    methocarbamol (ROBAXIN) 750 mg tablet    Other Relevant Orders    CBC and differential    Comprehensive metabolic panel    STRATIFY JCV(TM) AB W/RFX TO INHIBITION ASSAY       Other    Muscle cramping     Charisse continues with significant cramping of her hands and right knee down to her foot. She is using Baclofen 10 mg bid and 20 mg at bedtime and Gabapentin 100 mg bid and 600 mg qhs with only partial relief.    We will plan to discontinue Baclofen at this time and in its place she can trial Methocarbamol 750 mg up to 4 times daily. She should continue on Gabapentin but may consider an increase in dose as tolerated or a transition to Lyrica in the future if needed. She was advised to send me a message on [employer] with an update in a few weeks.            Relevant Medications    methocarbamol (ROBAXIN) 750 mg tablet            Reason for visit is   Chief Complaint   Patient presents with    Virtual Regular Visit          Encounter provider ANEESH Khan    Provider located at [address]  [facility] [place]  [address]  [facility] [address]      Recent Visits  No visits were found meeting these conditions.  Showing recent visits within past 7 days and meeting all other requirements  Today's Visits  Date Type Provider Dept   02/08/24 Telephone ANEESH Khan [facility]   02/08/24 Telemedicine ANEESH Khan [facility]   Showing today's visits and meeting all other requirements  Future Appointments  No visits were found meeting these conditions.  Showing future appointments within next 150 days and meeting all other requirements       The patient was identified by name and date of birth. Charisse Bowman was informed that this is a telemedicine visit and that the visit is being conducted through the Epic Embedded platform. She agrees to proceed..  My office door was closed. No one else was in the room.  She acknowledged consent and understanding of privacy and security of the video platform. The patient has agreed to participate and understands they can discontinue the visit at any time.    Patient is aware this is a billable service.     Subjective  Charisse Bowman is a 39 y.o. female known to the practice for Multiple Sclerosis. She was last seen 6/13/2023, at that time she was initiated on Tysabri after suffering a relapse with at least 6 new/acute and enhancing demyelinating lesions of her brain. She was treated with IV Solumedrol 7/20/2023 and then started Tysbari 7/31/23 Q 4 weeks. She is next scheduled 2/13/2024. She denies any infusion reactions and has tolerated her infusions well with no adverse effects.     9/15/23 JCV  0.23 indeterminate/negative  8/28/2023 CBC/CMP    She denies any new numbness, tingling, weakness, dizziness, headaches, vision changes, or changes in bowel or bladder. She denies any recent infections, UTIs, URIs, etc. She is vaccinated against the flu but has not had a recent covid-19 booster.    Unfortunately she does continue with significant  cramping of her hands and right knee down to her foot. She is using Baclofen 10 mg bid and 20 mg at bedtime and Gabapentin 100 mg bid and 600 mg qhs with only partial relief. She also continues with fatigue and reports recently grieving the loss of her father (passed away in 2023).      HPI     Past Medical History:   Diagnosis Date    Anxiety     Depression     Multiple sclerosis (HCC)        Past Surgical History:   Procedure Laterality Date     SECTION      IR LUMBAR PUNCTURE  2021       Current Outpatient Medications   Medication Sig Dispense Refill    baclofen 10 mg tablet TAKE 1 TABLET BY MOUTH THREE TIMES DAILY 90 tablet 0    fluticasone (FLONASE) 50 mcg/act nasal spray 2 sprays into each nostril daily      gabapentin (NEURONTIN) 300 mg capsule Take 1 capsule (300 mg total) by mouth 3 (three) times a day 90 capsule 3    lidocaine (LIDODERM) 5 % Apply 1 patch topically daily as needed (R. Knee pain) Remove & Discard patch within 12 hours or as directed by MD 15 patch 0    meloxicam (MOBIC) 7.5 mg tablet Take 1 tablet (7.5 mg total) by mouth daily 30 tablet 0    methocarbamol (ROBAXIN) 750 mg tablet Take 1 tablet (750 mg total) by mouth every 6 (six) hours as needed for muscle spasms 120 tablet 3    metroNIDAZOLE (FLAGYL) 500 mg tablet TAKE 1 TABLET BY MOUTH TWICE DAILY FOR 7 DAYS      famotidine (PEPCID) 20 mg tablet Take 20 mg by mouth every evening (Patient not taking: Reported on 2024)       No current facility-administered medications for this visit.        Allergies   Allergen Reactions    Bee Pollen Other (See Comments)     Other reaction(s): Nasal Congestion    Pollen Extract Nasal Congestion       Review of Systems   Constitutional:  Negative for appetite change, fatigue and fever.   HENT: Negative.  Negative for hearing loss, tinnitus, trouble swallowing and voice change.    Eyes: Negative.  Negative for photophobia, pain and visual disturbance.   Respiratory: Negative.   Negative for shortness of breath.    Cardiovascular: Negative.  Negative for palpitations.   Gastrointestinal: Negative.  Negative for nausea and vomiting.   Endocrine: Negative.  Negative for cold intolerance.   Genitourinary: Negative.  Negative for dysuria, frequency and urgency.   Musculoskeletal:  Negative for back pain, gait problem, myalgias, neck pain and neck stiffness.   Skin: Negative.  Negative for rash.   Allergic/Immunologic: Negative.    Neurological:  Positive for weakness (Cramping in hands). Negative for dizziness, tremors, seizures, syncope, facial asymmetry, speech difficulty, light-headedness, numbness and headaches.   Hematological: Negative.  Does not bruise/bleed easily.   Psychiatric/Behavioral: Negative.  Negative for confusion, hallucinations and sleep disturbance.    All other systems reviewed and are negative.  Reviewed ROS as entered by medical assistant.      Video Exam    There were no vitals filed for this visit.    Physical Exam     On neurological examination the patient was awake, alert, attentive, oriented to person, place, and time. Recent and remote memory intact to conversation with no evidence of language dysfunction. Satisfactory fund of knowledge. Normal attention span and concentration.  Mood, affect and judgement are appropriate. Speech is fluent without dysarthria or aphasia. Face appears symmetric, with no obvious weakness noted.  Audition is intact to casual conversation.  Eye movements are intact. Able to move bilateral upper extremities antigravity without difficulty.        Visit Time  Total Visit Duration: 20 minutes

## 2024-02-08 NOTE — TELEPHONE ENCOUNTER
Scheduled pt for 4 month f/u appt. She asked if it could be virtual. I let her know I would check and get back to her.   Appt made for 6/11/24 @ 2:00 with Archana in [place].   Please let me know about virtual.   Thank you.

## 2024-02-09 NOTE — TELEPHONE ENCOUNTER
VM 2/8 at 12:21 pm:    Hello, my name is Charisse. I'm just returning a call I have missed call from you guys. If you can give me a call back at [phone]. It's probably in reference to scheduling an appointment.   ______________________________________________    Appointment already scheduled.

## 2024-02-13 ENCOUNTER — HOSPITAL ENCOUNTER (OUTPATIENT)
Dept: INFUSION CENTER | Facility: CLINIC | Age: 39
Discharge: HOME | End: 2024-02-13

## 2024-02-13 ENCOUNTER — TELEPHONE (OUTPATIENT)
Dept: INFUSION CENTER | Facility: CLINIC | Age: 39
End: 2024-02-13

## 2024-02-13 NOTE — TELEPHONE ENCOUNTER
S/w patient to let her know to get her labs drawn before her appt on Thursday 2/15.  As Neurology Dr asked for them before appt.

## 2024-02-15 ENCOUNTER — HOSPITAL ENCOUNTER (OUTPATIENT)
Dept: INFUSION CENTER | Facility: CLINIC | Age: 39
Discharge: HOME | End: 2024-02-15
Payer: COMMERCIAL

## 2024-02-15 VITALS
DIASTOLIC BLOOD PRESSURE: 62 MMHG | HEART RATE: 74 BPM | SYSTOLIC BLOOD PRESSURE: 120 MMHG | TEMPERATURE: 96.9 F | RESPIRATION RATE: 17 BRPM

## 2024-02-15 DIAGNOSIS — G35 MS (MULTIPLE SCLEROSIS) (HCC): Primary | ICD-10-CM

## 2024-02-15 LAB
ALBUMIN SERPL BCP-MCNC: 4.3 G/DL (ref 3.5–5)
ALP SERPL-CCNC: 39 U/L (ref 34–104)
ALT SERPL W P-5'-P-CCNC: 10 U/L (ref 7–52)
ANION GAP SERPL CALCULATED.3IONS-SCNC: 8 MMOL/L
AST SERPL W P-5'-P-CCNC: 12 U/L (ref 13–39)
BASOPHILS # BLD AUTO: 0.03 THOUSANDS/ÂΜL (ref 0–0.1)
BASOPHILS NFR BLD AUTO: 1 % (ref 0–1)
BILIRUB SERPL-MCNC: 0.51 MG/DL (ref 0.2–1)
BUN SERPL-MCNC: 11 MG/DL (ref 5–25)
CALCIUM SERPL-MCNC: 9.2 MG/DL (ref 8.4–10.2)
CHLORIDE SERPL-SCNC: 104 MMOL/L (ref 96–108)
CO2 SERPL-SCNC: 24 MMOL/L (ref 21–32)
CREAT SERPL-MCNC: 0.74 MG/DL (ref 0.6–1.3)
EOSINOPHIL # BLD AUTO: 0.07 THOUSAND/ÂΜL (ref 0–0.61)
EOSINOPHIL NFR BLD AUTO: 1 % (ref 0–6)
ERYTHROCYTE [DISTWIDTH] IN BLOOD BY AUTOMATED COUNT: 15.5 % (ref 11.6–15.1)
GFR SERPL CREATININE-BSD FRML MDRD: 101 ML/MIN/1.73SQ M
GLUCOSE SERPL-MCNC: 90 MG/DL (ref 65–140)
HCT VFR BLD AUTO: 40.5 % (ref 34.8–46.1)
HGB BLD-MCNC: 12.4 G/DL (ref 11.5–15.4)
IMM GRANULOCYTES # BLD AUTO: 0.02 THOUSAND/UL (ref 0–0.2)
IMM GRANULOCYTES NFR BLD AUTO: 0 % (ref 0–2)
LYMPHOCYTES # BLD AUTO: 1.44 THOUSANDS/ÂΜL (ref 0.6–4.47)
LYMPHOCYTES NFR BLD AUTO: 24 % (ref 14–44)
MCH RBC QN AUTO: 22.1 PG (ref 26.8–34.3)
MCHC RBC AUTO-ENTMCNC: 30.6 G/DL (ref 31.4–37.4)
MCV RBC AUTO: 72 FL (ref 82–98)
MONOCYTES # BLD AUTO: 0.3 THOUSAND/ÂΜL (ref 0.17–1.22)
MONOCYTES NFR BLD AUTO: 5 % (ref 4–12)
NEUTROPHILS # BLD AUTO: 4.05 THOUSANDS/ÂΜL (ref 1.85–7.62)
NEUTS SEG NFR BLD AUTO: 69 % (ref 43–75)
NRBC BLD AUTO-RTO: 0 /100 WBCS
PLATELET # BLD AUTO: 260 THOUSANDS/UL (ref 149–390)
PMV BLD AUTO: 10.1 FL (ref 8.9–12.7)
POTASSIUM SERPL-SCNC: 3.8 MMOL/L (ref 3.5–5.3)
PROT SERPL-MCNC: 7.3 G/DL (ref 6.4–8.4)
RBC # BLD AUTO: 5.62 MILLION/UL (ref 3.81–5.12)
SODIUM SERPL-SCNC: 136 MMOL/L (ref 135–147)
WBC # BLD AUTO: 5.91 THOUSAND/UL (ref 4.31–10.16)

## 2024-02-15 PROCEDURE — 86711 JOHN CUNNINGHAM ANTIBODY: CPT | Performed by: PSYCHIATRY & NEUROLOGY

## 2024-02-15 PROCEDURE — 96365 THER/PROPH/DIAG IV INF INIT: CPT

## 2024-02-15 PROCEDURE — 85025 COMPLETE CBC W/AUTO DIFF WBC: CPT | Performed by: PSYCHIATRY & NEUROLOGY

## 2024-02-15 PROCEDURE — 80053 COMPREHEN METABOLIC PANEL: CPT | Performed by: PSYCHIATRY & NEUROLOGY

## 2024-02-15 PROCEDURE — 96367 TX/PROPH/DG ADDL SEQ IV INF: CPT

## 2024-02-15 RX ORDER — SODIUM CHLORIDE 9 MG/ML
20 INJECTION, SOLUTION INTRAVENOUS ONCE
OUTPATIENT
Start: 2024-03-12

## 2024-02-15 RX ORDER — ACETAMINOPHEN 325 MG/1
650 TABLET ORAL ONCE
OUTPATIENT
Start: 2024-03-12 | End: 2024-03-12

## 2024-02-15 RX ORDER — SODIUM CHLORIDE 9 MG/ML
20 INJECTION, SOLUTION INTRAVENOUS ONCE
Status: COMPLETED | OUTPATIENT
Start: 2024-02-15 | End: 2024-02-15

## 2024-02-15 RX ORDER — ACETAMINOPHEN 325 MG/1
650 TABLET ORAL ONCE
Status: COMPLETED | OUTPATIENT
Start: 2024-02-15 | End: 2024-02-15

## 2024-02-15 RX ADMIN — NATALIZUMAB 300 MG: 300 INJECTION INTRAVENOUS at 14:44

## 2024-02-15 RX ADMIN — SODIUM CHLORIDE 20 ML/HR: 0.9 INJECTION, SOLUTION INTRAVENOUS at 14:07

## 2024-02-15 RX ADMIN — DIPHENHYDRAMINE HYDROCHLORIDE 50 MG: 50 INJECTION, SOLUTION INTRAMUSCULAR; INTRAVENOUS at 14:06

## 2024-02-15 RX ADMIN — ACETAMINOPHEN 650 MG: 325 TABLET, FILM COATED ORAL at 14:06

## 2024-02-15 NOTE — PROGRESS NOTES
Pt to clinic for Tysabri ,pre checklist completed. Pt offers no new complaints today. Tolerated infusion without complications. Refused post obs time. Pt aware of next appointment. 03/14 at 2 pm. PIV removed. AVS declined. Pt ambulated out of clinic safely

## 2024-02-16 ENCOUNTER — TELEPHONE (OUTPATIENT)
Dept: NEUROLOGY | Facility: CLINIC | Age: 39
End: 2024-02-16

## 2024-02-17 NOTE — TELEPHONE ENCOUNTER
----- Message from Carleen Jon RN sent at 2/2/2024  7:43 AM EST -----  New PA is needed for Tysabri- current auth expires 2/20/24. Will need to submit after infusion on 2/13/24.

## 2024-02-22 LAB — MISCELLANEOUS LAB TEST RESULT: NORMAL

## 2024-02-27 NOTE — TELEPHONE ENCOUNTER
Tysabri is approved with [employer] from 2/23/24 to 2/23/25 at [facility]. Auth # [id number].

## 2024-02-28 DIAGNOSIS — G35 MS (MULTIPLE SCLEROSIS) (HCC): ICD-10-CM

## 2024-03-01 NOTE — TELEPHONE ENCOUNTER
Called pharmacy and advised then of Archana's recommendations  Pharmacist verbalizes understanding  Nothing further at this time 
Please call the pharmacy and let them know Lexapro 10 mg was discontinued  Patient will only be taking 5 mg prescribed by myself today   Thnx
PCP

## 2024-03-05 ENCOUNTER — TELEPHONE (OUTPATIENT)
Dept: NEUROLOGY | Facility: CLINIC | Age: 39
End: 2024-03-05

## 2024-03-05 NOTE — TELEPHONE ENCOUNTER
Pt is scheduled for Tysabri on 3/14/24.   Last infusion 2/15/24, infusions every 4 weeks. Confirmed scheduling is correct.      Last CBC/CMP 2/15/24  Last JCV 2/15/24 0.25 (final result negative)  Last MRI 5/28/23     Okay to proceed with infusion?     Tysabri is approved with [employer] from 2/23/24 to 2/23/25 at [facility] infusion [place]. Auth # [id number].     Tysabri TOUCH auth valid until 7/30/24 at [facility].

## 2024-03-12 ENCOUNTER — OFFICE VISIT (OUTPATIENT)
Dept: OBGYN CLINIC | Facility: CLINIC | Age: 39
End: 2024-03-12
Payer: COMMERCIAL

## 2024-03-12 VITALS
WEIGHT: 139.8 LBS | DIASTOLIC BLOOD PRESSURE: 71 MMHG | HEART RATE: 76 BPM | HEIGHT: 62 IN | BODY MASS INDEX: 25.73 KG/M2 | SYSTOLIC BLOOD PRESSURE: 112 MMHG

## 2024-03-12 DIAGNOSIS — M22.2X1 PATELLOFEMORAL SYNDROME, RIGHT: Primary | ICD-10-CM

## 2024-03-12 PROCEDURE — 99213 OFFICE O/P EST LOW 20 MIN: CPT | Performed by: ORTHOPAEDIC SURGERY

## 2024-03-12 RX ORDER — MELOXICAM 15 MG/1
15 TABLET ORAL DAILY
Qty: 45 TABLET | Refills: 2 | Status: SHIPPED | OUTPATIENT
Start: 2024-03-12

## 2024-03-12 NOTE — PROGRESS NOTES
Orthopaedics Office Visit - Established Patient Visit    ASSESSMENT/PLAN:    Assessment:   Right Knee Patellofemoral Syndrome     Plan:   She was fit with a trupull knee brace, to be worn for comfort and support  Mobic 15 MG was prescribed and sent to her pharmacy electronically  Home exercise packet given  Follow up in 2-3 months time for re-evaluation     To Do Next Visit:  Re-evaluation     _____________________________________________________  CHIEF COMPLAINT:  Chief Complaint   Patient presents with    Right Knee - Follow-up         SUBJECTIVE:  Charisse Bowman is a 39 y.o. female who presents to the office for a follow up regarding right knee pain. She was seen in  for this issue, at which time she underwent a right knee CSI, a PT script was provided and she was prescribed Mobic. The CSI was partially beneficial for her. She recently resumed therapy for her whole body as she was recently diagnosed with MA. She has been taking Mobic for pain control.     PAST MEDICAL HISTORY:  Past Medical History:   Diagnosis Date    Anxiety 2020    Depression 2020    Multiple sclerosis (HCC)        PAST SURGICAL HISTORY:  Past Surgical History:   Procedure Laterality Date     SECTION      IR LUMBAR PUNCTURE  2021       FAMILY HISTORY:  History reviewed. No pertinent family history.    SOCIAL HISTORY:  Social History     Tobacco Use    Smoking status: Former     Current packs/day: 0.00     Average packs/day: 0.3 packs/day for 12.0 years (3.0 ttl pk-yrs)     Types: Cigarettes     Start date: 7/15/1999     Quit date: 2011     Years since quittin.6    Smokeless tobacco: Never   Vaping Use    Vaping status: Never Used   Substance Use Topics    Alcohol use: Not Currently     Comment: socially with mom    Drug use: Yes     Types: Marijuana     Comment: not medical       MEDICATIONS:    Current Outpatient Medications:     baclofen 10 mg tablet, TAKE 1 TABLET BY MOUTH THREE TIMES DAILY, Disp: 90 tablet, Rfl:  "0    fluticasone (FLONASE) 50 mcg/act nasal spray, 2 sprays into each nostril daily, Disp: , Rfl:     gabapentin (NEURONTIN) 300 mg capsule, Take 1 capsule (300 mg total) by mouth 3 (three) times a day, Disp: 90 capsule, Rfl: 3    meloxicam (MOBIC) 7.5 mg tablet, Take 1 tablet (7.5 mg total) by mouth daily, Disp: 30 tablet, Rfl: 0    methocarbamol (ROBAXIN) 750 mg tablet, Take 1 tablet (750 mg total) by mouth every 6 (six) hours as needed for muscle spasms, Disp: 120 tablet, Rfl: 3    metroNIDAZOLE (FLAGYL) 500 mg tablet, TAKE 1 TABLET BY MOUTH TWICE DAILY FOR 7 DAYS, Disp: , Rfl:     famotidine (PEPCID) 20 mg tablet, Take 20 mg by mouth every evening (Patient not taking: Reported on 2/8/2024), Disp: , Rfl:     lidocaine (LIDODERM) 5 %, Apply 1 patch topically daily as needed (R. Knee pain) Remove & Discard patch within 12 hours or as directed by MD, Disp: 15 patch, Rfl: 0    ALLERGIES:  Allergies   Allergen Reactions    Bee Pollen Other (See Comments)     Other reaction(s): Nasal Congestion    Pollen Extract Nasal Congestion       REVIEW OF SYSTEMS:  MSK: as noted in HPI  Neuro: WNL's  Pertinent items are otherwise noted in HPI.  A comprehensive review of systems was otherwise negative.    LABS:  HgA1c: No results found for: \"HGBA1C\"  BMP:   Lab Results   Component Value Date    CALCIUM 9.2 02/15/2024    K 3.8 02/15/2024    CO2 24 02/15/2024     02/15/2024    BUN 11 02/15/2024    CREATININE 0.74 02/15/2024     CBC: No components found for: \"CBC\"    _____________________________________________________  PHYSICAL EXAMINATION:  Vital signs: /71   Pulse 76   Ht 5' 1.5\" (1.562 m)   Wt 63.4 kg (139 lb 12.8 oz)   BMI 25.99 kg/m²   General: No acute distress, awake and alert  Psychiatric: Mood and affect appear appropriate  HEENT: Trachea Midline, No torticollis, no apparent facial trauma  Cardiovascular: No audible murmurs; Extremities appear perfused  Pulmonary: No audible wheezing or stridor  Skin: No " open lesions; see further details (if any) below    MUSCULOSKELETAL EXAMINATION:    Extremities:  Right knee     No erythema, ecchymosis or edema  No effusion   + patellar grind   No medial or lateral joint line tenderness   Patellofemoral joint tenderness  ROM 0-130 degrees   Ambulates without assistance  Extremity appears warm and well perfused      _____________________________________________________  STUDIES REVIEWED:  I personally reviewed the images and interpretation is as follows:  No new imaging to review       PROCEDURES PERFORMED:  Procedures    Scribe Attestation      I,:  Sofi Harris am acting as a scribe while in the presence of the attending physician.:       I,:  Terrence Silva MD personally performed the services described in this documentation    as scribed in my presence.:

## 2024-03-14 ENCOUNTER — HOSPITAL ENCOUNTER (OUTPATIENT)
Dept: INFUSION CENTER | Facility: CLINIC | Age: 39
Discharge: HOME | End: 2024-03-14
Payer: COMMERCIAL

## 2024-03-14 VITALS
SYSTOLIC BLOOD PRESSURE: 107 MMHG | DIASTOLIC BLOOD PRESSURE: 63 MMHG | TEMPERATURE: 98.3 F | RESPIRATION RATE: 18 BRPM | HEART RATE: 76 BPM

## 2024-03-14 DIAGNOSIS — G35 MS (MULTIPLE SCLEROSIS) (HCC): Primary | ICD-10-CM

## 2024-03-14 LAB
ALBUMIN SERPL BCP-MCNC: 4.2 G/DL (ref 3.5–5)
ALP SERPL-CCNC: 40 U/L (ref 34–104)
ALT SERPL W P-5'-P-CCNC: 11 U/L (ref 7–52)
ANION GAP SERPL CALCULATED.3IONS-SCNC: 7 MMOL/L (ref 4–13)
AST SERPL W P-5'-P-CCNC: 11 U/L (ref 13–39)
BASOPHILS # BLD AUTO: 0.04 THOUSANDS/ÂΜL (ref 0–0.1)
BASOPHILS NFR BLD AUTO: 0 % (ref 0–1)
BILIRUB SERPL-MCNC: 0.41 MG/DL (ref 0.2–1)
BUN SERPL-MCNC: 13 MG/DL (ref 5–25)
CALCIUM SERPL-MCNC: 9.3 MG/DL (ref 8.4–10.2)
CHLORIDE SERPL-SCNC: 106 MMOL/L (ref 96–108)
CO2 SERPL-SCNC: 25 MMOL/L (ref 21–32)
CREAT SERPL-MCNC: 0.7 MG/DL (ref 0.6–1.3)
EOSINOPHIL # BLD AUTO: 0.23 THOUSAND/ÂΜL (ref 0–0.61)
EOSINOPHIL NFR BLD AUTO: 2 % (ref 0–6)
ERYTHROCYTE [DISTWIDTH] IN BLOOD BY AUTOMATED COUNT: 15.9 % (ref 11.6–15.1)
GFR SERPL CREATININE-BSD FRML MDRD: 108 ML/MIN/1.73SQ M
GLUCOSE SERPL-MCNC: 98 MG/DL (ref 65–140)
HCT VFR BLD AUTO: 38.6 % (ref 34.8–46.1)
HGB BLD-MCNC: 12.1 G/DL (ref 11.5–15.4)
IMM GRANULOCYTES # BLD AUTO: 0.02 THOUSAND/UL (ref 0–0.2)
IMM GRANULOCYTES NFR BLD AUTO: 0 % (ref 0–2)
LYMPHOCYTES # BLD AUTO: 3.09 THOUSANDS/ÂΜL (ref 0.6–4.47)
LYMPHOCYTES NFR BLD AUTO: 32 % (ref 14–44)
MCH RBC QN AUTO: 22.6 PG (ref 26.8–34.3)
MCHC RBC AUTO-ENTMCNC: 31.3 G/DL (ref 31.4–37.4)
MCV RBC AUTO: 72 FL (ref 82–98)
MONOCYTES # BLD AUTO: 0.63 THOUSAND/ÂΜL (ref 0.17–1.22)
MONOCYTES NFR BLD AUTO: 6 % (ref 4–12)
NEUTROPHILS # BLD AUTO: 5.8 THOUSANDS/ÂΜL (ref 1.85–7.62)
NEUTS SEG NFR BLD AUTO: 60 % (ref 43–75)
NRBC BLD AUTO-RTO: 0 /100 WBCS
PLATELET # BLD AUTO: 286 THOUSANDS/UL (ref 149–390)
PMV BLD AUTO: 10.5 FL (ref 8.9–12.7)
POTASSIUM SERPL-SCNC: 3.6 MMOL/L (ref 3.5–5.3)
PROT SERPL-MCNC: 7.3 G/DL (ref 6.4–8.4)
RBC # BLD AUTO: 5.36 MILLION/UL (ref 3.81–5.12)
SODIUM SERPL-SCNC: 138 MMOL/L (ref 135–147)
WBC # BLD AUTO: 9.81 THOUSAND/UL (ref 4.31–10.16)

## 2024-03-14 PROCEDURE — 96365 THER/PROPH/DIAG IV INF INIT: CPT

## 2024-03-14 PROCEDURE — 80053 COMPREHEN METABOLIC PANEL: CPT | Performed by: PSYCHIATRY & NEUROLOGY

## 2024-03-14 PROCEDURE — 85025 COMPLETE CBC W/AUTO DIFF WBC: CPT | Performed by: PSYCHIATRY & NEUROLOGY

## 2024-03-14 PROCEDURE — 96367 TX/PROPH/DG ADDL SEQ IV INF: CPT

## 2024-03-14 RX ORDER — SODIUM CHLORIDE 9 MG/ML
20 INJECTION, SOLUTION INTRAVENOUS ONCE
OUTPATIENT
Start: 2024-04-11

## 2024-03-14 RX ORDER — ACETAMINOPHEN 325 MG/1
650 TABLET ORAL ONCE
OUTPATIENT
Start: 2024-04-11 | End: 2024-04-11

## 2024-03-14 RX ORDER — ACETAMINOPHEN 325 MG/1
650 TABLET ORAL ONCE
Status: COMPLETED | OUTPATIENT
Start: 2024-03-14 | End: 2024-03-14

## 2024-03-14 RX ORDER — SODIUM CHLORIDE 9 MG/ML
20 INJECTION, SOLUTION INTRAVENOUS ONCE
Status: COMPLETED | OUTPATIENT
Start: 2024-03-14 | End: 2024-03-14

## 2024-03-14 RX ADMIN — NATALIZUMAB 300 MG: 300 INJECTION INTRAVENOUS at 15:25

## 2024-03-14 RX ADMIN — DIPHENHYDRAMINE HYDROCHLORIDE 50 MG: 50 INJECTION, SOLUTION INTRAMUSCULAR; INTRAVENOUS at 14:48

## 2024-03-14 RX ADMIN — ACETAMINOPHEN 650 MG: 325 TABLET, FILM COATED ORAL at 14:48

## 2024-03-14 RX ADMIN — SODIUM CHLORIDE 20 ML/HR: 9 INJECTION, SOLUTION INTRAVENOUS at 15:20

## 2024-03-14 NOTE — PROGRESS NOTES
Pt to clinic for Tysabri. Pt reports new changes in thinking and concentrating. Dr. Sara Steel notified and gave the okay to proceed with treatment today. Pt tolerating infusion without complications. Pt aware of next appointment on 4/9/24 at 1200. AVS declined. Report given to Nata GROVES RN.

## 2024-03-14 NOTE — PROGRESS NOTES
Pt presents for tysabri offering no compliants. Pt tolerated treatment without incident. Pt waited 15 minutes for observation and refused rest of observation time. Patient walked out in stable condition. PIV removed. AVS printed. Next appointment reviewed on 4/9 at 12pm.

## 2024-03-19 DIAGNOSIS — G35 MS (MULTIPLE SCLEROSIS) (HCC): ICD-10-CM

## 2024-03-19 RX ORDER — GABAPENTIN 300 MG/1
300 CAPSULE ORAL 3 TIMES DAILY
Qty: 90 CAPSULE | Refills: 3 | Status: SHIPPED | OUTPATIENT
Start: 2024-03-19

## 2024-03-19 RX ORDER — BACLOFEN 10 MG/1
10 TABLET ORAL 3 TIMES DAILY
Qty: 90 TABLET | Refills: 3 | Status: SHIPPED | OUTPATIENT
Start: 2024-03-19 | End: 2024-03-19 | Stop reason: ALTCHOICE

## 2024-03-19 RX ORDER — GABAPENTIN 300 MG/1
300 CAPSULE ORAL 3 TIMES DAILY
Qty: 90 CAPSULE | Refills: 0 | Status: CANCELLED | OUTPATIENT
Start: 2024-03-19

## 2024-03-19 RX ORDER — BACLOFEN 10 MG/1
10 TABLET ORAL 3 TIMES DAILY
Qty: 90 TABLET | Refills: 0 | Status: CANCELLED | OUTPATIENT
Start: 2024-03-19

## 2024-03-19 NOTE — TELEPHONE ENCOUNTER
Spoke with pharmacist and informed her of cancellation. Pharmacist says she sees baclofen is cancelled on her end. Nothing further at this time.

## 2024-03-19 NOTE — TELEPHONE ENCOUNTER
"I just signed off on script for Baclofen-    However last office note states \"We will plan to discontinue Baclofen at this time and in its place she can trial Methocarbamol 750 mg up to 4 times daily. \"    Please confirm with patient if she is using Baclofen or Methocarbamol?   "

## 2024-03-19 NOTE — TELEPHONE ENCOUNTER
Spoke with pt and she says she has been using the Methocarbamol and forgot that the plan was to d/c baclofen. Pt agreeable to remain on Methocarbamol and to d/c baclofen.    Also informed pt that script for Gabapentin was sent. Pt appreciative.    Archana - Please cancel script for baclofen. Thank you!

## 2024-03-19 NOTE — TELEPHONE ENCOUNTER
Gabapentin 300 mg    Next OV 6/11/24 with Archana.    Last script sent 6/13/23. Quantity: 90. Refills: 3.    Archana - Rx entered. Please review and sign if in agreement.

## 2024-03-19 NOTE — TELEPHONE ENCOUNTER
Baclofen 10 mg    Next OV 6/11/24 with Archana.    Last script sent 9/27/24. Quantity: 90. Refills: 0.    Archana - Rx entered. Please review and sign if in agreement.

## 2024-03-29 ENCOUNTER — TELEPHONE (OUTPATIENT)
Dept: NEUROLOGY | Facility: CLINIC | Age: 39
End: 2024-03-29

## 2024-03-29 NOTE — TELEPHONE ENCOUNTER
Pt is scheduled for Tysabri on 4/9/24.   Last infusion 3/14/24, infusions every 4 weeks. Pt is not due for infusion until 4/11/24. Cannot have infusion earlier than 28 days from last infusion. Must be rescheduled.    Called pt, explained need for reschedule. Pt agreeable.     Called [facility] infusion center with pt on the line, spoke with Tona. Rescheduled infusion to 4/12/24 @ 11:30am. Confirmed with pt labs are not needed prior to infusion.

## 2024-03-29 NOTE — TELEPHONE ENCOUNTER
Pt is scheduled for Tysabri on 4/12/24.   Last infusion 3/14/24, infusions every 4 weeks. Confirmed scheduling is correct (+1 day).      Last CBC/CMP 3/14/24  Last JCV 2/15/24 0.25 (final result negative)  Last MRI 5/28/23     Okay to proceed with infusion?     Tysabri is approved with P from 2/23/24 to 2/23/25 at [facility] infusion [place]. Auth # [id number].     Tysabri TOUCH auth valid until 7/30/24 at [facility].

## 2024-04-08 ENCOUNTER — TELEPHONE (OUTPATIENT)
Dept: HEMATOLOGY ONCOLOGY | Facility: CLINIC | Age: 39
End: 2024-04-08

## 2024-04-08 NOTE — TELEPHONE ENCOUNTER
Appointment Schedule   Who are you speaking with? Patient   If it is not the patient, are they listed on an active communication consent form? N/A   Which provider is the appointment scheduled with? Blanca Tabares    At which location is the appointment scheduled for? [place]   When is the appointment scheduled?  Please list date and time 5/9/24 0880   What is the reason for this appointment? F/u   Did patient voice understanding of the details of this appointment? Yes   Was the no show policy reviewed with patient? Yes

## 2024-04-12 ENCOUNTER — HOSPITAL ENCOUNTER (OUTPATIENT)
Dept: INFUSION CENTER | Facility: CLINIC | Age: 39
End: 2024-04-12
Payer: COMMERCIAL

## 2024-04-12 VITALS
RESPIRATION RATE: 18 BRPM | DIASTOLIC BLOOD PRESSURE: 69 MMHG | TEMPERATURE: 97.1 F | HEART RATE: 76 BPM | SYSTOLIC BLOOD PRESSURE: 105 MMHG

## 2024-04-12 DIAGNOSIS — G35 MS (MULTIPLE SCLEROSIS) (HCC): Primary | ICD-10-CM

## 2024-04-12 PROCEDURE — 96367 TX/PROPH/DG ADDL SEQ IV INF: CPT

## 2024-04-12 PROCEDURE — 96365 THER/PROPH/DIAG IV INF INIT: CPT

## 2024-04-12 RX ORDER — ACETAMINOPHEN 325 MG/1
650 TABLET ORAL ONCE
OUTPATIENT
Start: 2024-05-09 | End: 2024-05-09

## 2024-04-12 RX ORDER — SODIUM CHLORIDE 9 MG/ML
20 INJECTION, SOLUTION INTRAVENOUS ONCE
Status: COMPLETED | OUTPATIENT
Start: 2024-04-12 | End: 2024-04-12

## 2024-04-12 RX ORDER — ACETAMINOPHEN 325 MG/1
650 TABLET ORAL ONCE
Status: COMPLETED | OUTPATIENT
Start: 2024-04-12 | End: 2024-04-12

## 2024-04-12 RX ORDER — SODIUM CHLORIDE 9 MG/ML
20 INJECTION, SOLUTION INTRAVENOUS ONCE
OUTPATIENT
Start: 2024-05-09

## 2024-04-12 RX ADMIN — ACETAMINOPHEN 650 MG: 325 TABLET, FILM COATED ORAL at 12:02

## 2024-04-12 RX ADMIN — NATALIZUMAB 300 MG: 300 INJECTION INTRAVENOUS at 12:46

## 2024-04-12 RX ADMIN — DIPHENHYDRAMINE HYDROCHLORIDE 50 MG: 50 INJECTION, SOLUTION INTRAMUSCULAR; INTRAVENOUS at 12:02

## 2024-04-12 RX ADMIN — SODIUM CHLORIDE 20 ML/HR: 0.9 INJECTION, SOLUTION INTRAVENOUS at 12:02

## 2024-04-12 NOTE — PROGRESS NOTES
Pt presents for tysabri offering no compliants. Pre infusion checklist complete. Pt tolerated treatment without incident. PIV removed.  AVS declined. Next appointment reviewed on 5/10 at 12:30pm.

## 2024-04-26 ENCOUNTER — TELEPHONE (OUTPATIENT)
Dept: NEUROLOGY | Facility: CLINIC | Age: 39
End: 2024-04-26

## 2024-04-26 NOTE — TELEPHONE ENCOUNTER
Pt is scheduled for Tysabri on 5/10/24.   Last infusion 4/12/24, infusions every 4 weeks. Confirmed scheduling is correct.      Last CBC/CMP 3/14/24  Last JCV 2/15/24 0.25 (final result negative)  Last MRI 5/28/23     Okay to proceed with infusion?     Tysabri is approved with [employer] from 2/23/24 to 2/23/25 at [facility] infusion [place]. Auth # [id number].     Tysabri TOUCH auth valid until 7/30/24 at [facility].

## 2024-04-26 NOTE — TELEPHONE ENCOUNTER
Emily Luu   11/24/2017   Anticoagulation Therapy Visit    Description:  61 year old female   Provider:  Kelsey Macario, RN   Department:  [facility]           INR as of 11/24/2017     Today's INR 2.70      Anticoagulation Summary as of 11/24/2017     INR goal 2.0-3.0   Today's INR 2.70   Full instructions 11/24: Hold; 11/25: Hold; 11/26: Hold; 11/27: Hold; Otherwise 7.5 mg every day   Next INR check 11/28/2017    Indications   Long-term (current) use of anticoagulants [Z79.01] [Z79.01]  Pulmonary embolism (H) [I26.99]         November 2017 Details    Sun Mon Tue Wed Thu Fri Sat        1               2               3               4                 5               6               7               8               9               10               11                 12               13               14               15               16               17               18                 19               20               21               22               23               24      Hold   See details      25      Hold           26      Hold         27      Hold         28            29 30                  Date Details   11/24 This INR check       Date of next INR:  11/28/2017         How to take your warfarin dose     To take:  7.5 mg Take 1.5 of the 5 mg tablets.    Hold Do not take your warfarin dose. See the Details table to the right for additional instructions.                 Patient is cleared for infusion as scheduled

## 2024-04-30 ENCOUNTER — TELEPHONE (OUTPATIENT)
Dept: HEMATOLOGY ONCOLOGY | Facility: CLINIC | Age: 39
End: 2024-04-30

## 2024-04-30 DIAGNOSIS — D50.0 IRON DEFICIENCY ANEMIA DUE TO CHRONIC BLOOD LOSS: Primary | ICD-10-CM

## 2024-04-30 DIAGNOSIS — G35 MS (MULTIPLE SCLEROSIS) (HCC): ICD-10-CM

## 2024-04-30 DIAGNOSIS — R71.8 MICROCYTOSIS: ICD-10-CM

## 2024-04-30 NOTE — TELEPHONE ENCOUNTER
Lvm informing of labs entered under Blanca's name to be done prior to appt, along with appt date/time + call back number if needed

## 2024-05-01 ENCOUNTER — TELEPHONE (OUTPATIENT)
Dept: NEUROLOGY | Facility: CLINIC | Age: 39
End: 2024-05-01

## 2024-05-01 NOTE — TELEPHONE ENCOUNTER
Called pt left message on voicemail to reschedule her time for her virtual apt . Archana has no opening at [place] office for 2pm virtual pt  needs to be rescheduled for a better time . Pt was on [facility] schedule

## 2024-05-02 ENCOUNTER — TELEPHONE (OUTPATIENT)
Dept: NEUROLOGY | Facility: CLINIC | Age: 39
End: 2024-05-02

## 2024-05-07 ENCOUNTER — TELEPHONE (OUTPATIENT)
Dept: HEMATOLOGY ONCOLOGY | Facility: CLINIC | Age: 39
End: 2024-05-07

## 2024-05-09 ENCOUNTER — APPOINTMENT (OUTPATIENT)
Dept: LAB | Facility: HOSPITAL | Age: 39
End: 2024-05-09
Payer: COMMERCIAL

## 2024-05-09 DIAGNOSIS — R71.8 MICROCYTOSIS: ICD-10-CM

## 2024-05-09 DIAGNOSIS — G35 MS (MULTIPLE SCLEROSIS) (HCC): ICD-10-CM

## 2024-05-09 DIAGNOSIS — D50.0 IRON DEFICIENCY ANEMIA DUE TO CHRONIC BLOOD LOSS: ICD-10-CM

## 2024-05-09 LAB
BACTERIA UR QL AUTO: NORMAL /HPF
BASOPHILS # BLD AUTO: 0.03 THOUSANDS/ÂΜL (ref 0–0.1)
BASOPHILS NFR BLD AUTO: 0 % (ref 0–1)
BILIRUB UR QL STRIP: NEGATIVE
CLARITY UR: CLEAR
COLOR UR: ABNORMAL
EOSINOPHIL # BLD AUTO: 0.28 THOUSAND/ÂΜL (ref 0–0.61)
EOSINOPHIL NFR BLD AUTO: 3 % (ref 0–6)
ERYTHROCYTE [DISTWIDTH] IN BLOOD BY AUTOMATED COUNT: 16.1 % (ref 11.6–15.1)
FERRITIN SERPL-MCNC: 22 NG/ML (ref 11–307)
GLIADIN PEPTIDE IGA SER-ACNC: 0.3 U/ML
GLIADIN PEPTIDE IGA SER-ACNC: NEGATIVE
GLIADIN PEPTIDE IGG SER-ACNC: <0.4 U/ML
GLIADIN PEPTIDE IGG SER-ACNC: NEGATIVE
GLUCOSE UR STRIP-MCNC: NEGATIVE MG/DL
HCT VFR BLD AUTO: 38.4 % (ref 34.8–46.1)
HGB BLD-MCNC: 11.9 G/DL (ref 11.5–15.4)
HGB UR QL STRIP.AUTO: ABNORMAL
IGA SERPL-MCNC: 318 MG/DL (ref 66–433)
IMM GRANULOCYTES # BLD AUTO: 0.03 THOUSAND/UL (ref 0–0.2)
IMM GRANULOCYTES NFR BLD AUTO: 0 % (ref 0–2)
IRON SATN MFR SERPL: 38 % (ref 15–50)
IRON SERPL-MCNC: 110 UG/DL (ref 50–212)
KETONES UR STRIP-MCNC: NEGATIVE MG/DL
LEUKOCYTE ESTERASE UR QL STRIP: NEGATIVE
LYMPHOCYTES # BLD AUTO: 3.13 THOUSANDS/ÂΜL (ref 0.6–4.47)
LYMPHOCYTES NFR BLD AUTO: 36 % (ref 14–44)
MCH RBC QN AUTO: 22.2 PG (ref 26.8–34.3)
MCHC RBC AUTO-ENTMCNC: 31 G/DL (ref 31.4–37.4)
MCV RBC AUTO: 72 FL (ref 82–98)
MONOCYTES # BLD AUTO: 0.67 THOUSAND/ÂΜL (ref 0.17–1.22)
MONOCYTES NFR BLD AUTO: 8 % (ref 4–12)
NEUTROPHILS # BLD AUTO: 4.5 THOUSANDS/ÂΜL (ref 1.85–7.62)
NEUTS SEG NFR BLD AUTO: 53 % (ref 43–75)
NITRITE UR QL STRIP: NEGATIVE
NON-SQ EPI CELLS URNS QL MICRO: NORMAL /HPF
NRBC BLD AUTO-RTO: 0 /100 WBCS
PH UR STRIP.AUTO: 5.5 [PH]
PLATELET # BLD AUTO: 236 THOUSANDS/UL (ref 149–390)
PMV BLD AUTO: 9.7 FL (ref 8.9–12.7)
PROT UR STRIP-MCNC: NEGATIVE MG/DL
RBC # BLD AUTO: 5.35 MILLION/UL (ref 3.81–5.12)
RBC #/AREA URNS AUTO: NORMAL /HPF
SP GR UR STRIP.AUTO: 1.01 (ref 1–1.03)
TIBC SERPL-MCNC: 292 UG/DL (ref 250–450)
TTG IGA SER-ACNC: <0.5 U/ML
TTG IGA SER-ACNC: NEGATIVE
TTG IGG SER-ACNC: 1.1 U/ML
TTG IGG SER-ACNC: NEGATIVE
UIBC SERPL-MCNC: 182 UG/DL (ref 155–355)
UROBILINOGEN UR STRIP-ACNC: <2 MG/DL
WBC # BLD AUTO: 8.64 THOUSAND/UL (ref 4.31–10.16)
WBC #/AREA URNS AUTO: NORMAL /HPF

## 2024-05-09 PROCEDURE — 83540 ASSAY OF IRON: CPT

## 2024-05-09 PROCEDURE — 86364 TISS TRNSGLTMNASE EA IG CLAS: CPT

## 2024-05-09 PROCEDURE — 82784 ASSAY IGA/IGD/IGG/IGM EACH: CPT

## 2024-05-09 PROCEDURE — 83550 IRON BINDING TEST: CPT

## 2024-05-09 PROCEDURE — 36415 COLL VENOUS BLD VENIPUNCTURE: CPT

## 2024-05-09 PROCEDURE — 85025 COMPLETE CBC W/AUTO DIFF WBC: CPT

## 2024-05-09 PROCEDURE — 83020 HEMOGLOBIN ELECTROPHORESIS: CPT

## 2024-05-09 PROCEDURE — 86258 DGP ANTIBODY EACH IG CLASS: CPT

## 2024-05-09 PROCEDURE — 82728 ASSAY OF FERRITIN: CPT

## 2024-05-09 PROCEDURE — 81001 URINALYSIS AUTO W/SCOPE: CPT

## 2024-05-10 ENCOUNTER — HOSPITAL ENCOUNTER (OUTPATIENT)
Dept: INFUSION CENTER | Facility: CLINIC | Age: 39
End: 2024-05-10
Payer: COMMERCIAL

## 2024-05-10 VITALS
TEMPERATURE: 97 F | RESPIRATION RATE: 18 BRPM | HEART RATE: 73 BPM | SYSTOLIC BLOOD PRESSURE: 108 MMHG | DIASTOLIC BLOOD PRESSURE: 70 MMHG

## 2024-05-10 DIAGNOSIS — G35 MS (MULTIPLE SCLEROSIS) (HCC): Primary | ICD-10-CM

## 2024-05-10 PROCEDURE — 96367 TX/PROPH/DG ADDL SEQ IV INF: CPT

## 2024-05-10 PROCEDURE — 96365 THER/PROPH/DIAG IV INF INIT: CPT

## 2024-05-10 RX ORDER — SODIUM CHLORIDE 9 MG/ML
20 INJECTION, SOLUTION INTRAVENOUS ONCE
OUTPATIENT
Start: 2024-06-06

## 2024-05-10 RX ORDER — ACETAMINOPHEN 325 MG/1
650 TABLET ORAL ONCE
Status: COMPLETED | OUTPATIENT
Start: 2024-05-10 | End: 2024-05-10

## 2024-05-10 RX ORDER — SODIUM CHLORIDE 9 MG/ML
20 INJECTION, SOLUTION INTRAVENOUS ONCE
Status: COMPLETED | OUTPATIENT
Start: 2024-05-10 | End: 2024-05-10

## 2024-05-10 RX ORDER — ACETAMINOPHEN 325 MG/1
650 TABLET ORAL ONCE
OUTPATIENT
Start: 2024-06-06 | End: 2024-06-06

## 2024-05-10 RX ADMIN — SODIUM CHLORIDE 20 ML/HR: 0.9 INJECTION, SOLUTION INTRAVENOUS at 12:58

## 2024-05-10 RX ADMIN — NATALIZUMAB 300 MG: 300 INJECTION INTRAVENOUS at 13:33

## 2024-05-10 RX ADMIN — ACETAMINOPHEN 650 MG: 325 TABLET, FILM COATED ORAL at 12:57

## 2024-05-10 RX ADMIN — DIPHENHYDRAMINE HYDROCHLORIDE 50 MG: 50 INJECTION, SOLUTION INTRAMUSCULAR; INTRAVENOUS at 12:58

## 2024-05-10 NOTE — PROGRESS NOTES
Pt to clinic for Tysabri. Pt offers no complaints. Pt tolerated infusion without complications. PIV removed. Pt aware of next appointment on 6/6/24 at 1330. AVS declined.

## 2024-05-13 LAB
HGB A MFR BLD: 1.9 % (ref 1.8–3.2)
HGB A MFR BLD: 98.1 % (ref 96.4–98.8)
HGB F MFR BLD: 0 % (ref 0–2)
HGB FRACT BLD-IMP: NORMAL
HGB S MFR BLD: 0 %

## 2024-05-14 ENCOUNTER — APPOINTMENT (OUTPATIENT)
Dept: LAB | Facility: HOSPITAL | Age: 39
End: 2024-05-14
Payer: COMMERCIAL

## 2024-05-14 DIAGNOSIS — R71.8 MICROCYTOSIS: ICD-10-CM

## 2024-05-14 DIAGNOSIS — G35 MS (MULTIPLE SCLEROSIS) (HCC): ICD-10-CM

## 2024-05-14 DIAGNOSIS — D50.0 IRON DEFICIENCY ANEMIA DUE TO CHRONIC BLOOD LOSS: ICD-10-CM

## 2024-05-14 LAB — HEMOCCULT STL QL IA: POSITIVE

## 2024-05-14 PROCEDURE — G0328 FECAL BLOOD SCRN IMMUNOASSAY: HCPCS

## 2024-05-15 DIAGNOSIS — D50.0 IRON DEFICIENCY ANEMIA DUE TO CHRONIC BLOOD LOSS: Primary | ICD-10-CM

## 2024-05-17 ENCOUNTER — OFFICE VISIT (OUTPATIENT)
Dept: HEMATOLOGY ONCOLOGY | Facility: CLINIC | Age: 39
End: 2024-05-17
Payer: COMMERCIAL

## 2024-05-17 ENCOUNTER — TELEPHONE (OUTPATIENT)
Dept: HEMATOLOGY ONCOLOGY | Facility: CLINIC | Age: 39
End: 2024-05-17

## 2024-05-17 VITALS
HEART RATE: 77 BPM | TEMPERATURE: 98.3 F | DIASTOLIC BLOOD PRESSURE: 60 MMHG | RESPIRATION RATE: 16 BRPM | OXYGEN SATURATION: 98 % | BODY MASS INDEX: 26.02 KG/M2 | SYSTOLIC BLOOD PRESSURE: 102 MMHG | WEIGHT: 140 LBS

## 2024-05-17 DIAGNOSIS — D50.0 IRON DEFICIENCY ANEMIA DUE TO CHRONIC BLOOD LOSS: Primary | ICD-10-CM

## 2024-05-17 DIAGNOSIS — R19.5 HEME POSITIVE STOOL: ICD-10-CM

## 2024-05-17 DIAGNOSIS — N92.0 MENORRHAGIA WITH REGULAR CYCLE: ICD-10-CM

## 2024-05-17 PROCEDURE — 99214 OFFICE O/P EST MOD 30 MIN: CPT | Performed by: PHYSICIAN ASSISTANT

## 2024-05-17 RX ORDER — SODIUM CHLORIDE 9 MG/ML
20 INJECTION, SOLUTION INTRAVENOUS ONCE
OUTPATIENT
Start: 2024-05-27

## 2024-05-17 RX ORDER — BUPROPION HYDROCHLORIDE 150 MG/1
1 TABLET ORAL EVERY MORNING
COMMUNITY
Start: 2024-03-08 | End: 2025-03-08

## 2024-05-17 NOTE — PROGRESS NOTES
[facility] SPECIAL[facility] [address] [address]  Hematology Ambulatory Follow-Up  Charisse Bowman, 1985, [id number]  5/17/2024      Assessment and Plan   1. Iron deficiency anemia due to chronic blood loss  - UA w Reflex to Microscopic w Reflex to Culture; Future  - US pelvis complete non OB; Future  - CBC and differential; Future  - Iron Panel (Includes Ferritin, Iron Sat%, Iron, and TIBC); Future    2. Menorrhagia with regular cycle  3. Heme positive stool    This is a 39-year-old female with a history of iron deficiency anemia since at least 7/2021. She admits to menorrhagia that is slight with regular cycles. Had IUD placed without improvement in symptoms qa few years ago. She had IV Venofer 1200mg in fall of last year.      Her recent workup showed normal hemoglobin electrophoresis, positive Hemoccult study and ?  microscopic hematuria.  Hemoglobin now 11.9, MCV 72, platelets 236,000.  Ferritin 22.  She has not seen any blood in stool or urine or black stools. No known hemorrhoid hx     Discussion/Plan  Will give additional IV Venofer 300 mg x 2  We can consider testing for alpha thalassemia in the future if her microcytosis persists despite normal ferritin levels.  I do not feel that her menorrhagia alone could explain her degree of anemia. She was strongly recommended to follow-up with gastroenterology for further evaluation of positive Hemoccult study and LINDA.  Referral placed  Obtain pelvic ultrasound to  further evaluate menorrhagia.  If pelvic ultrasound is abnormal, I did make patient aware she will need to follow-up with her gynecologist.  Repeat iron panel, CBC D, UA in approximately 2 to 3 months following infusions.  RTC at this time.    Patient voiced agreement and understanding to the above.   Patient advised to call the Hematology/Oncology office with any questions and concerns regarding the above.    Barrier(s) to care:  None  The patient is able to self care.    Blanca Tabares PA-C   Medical Oncology/Hematology  [facility]    Subjective   No chief complaint on file.    History of present illness: Charisse Bowman is a 39 y.o. female who came in for consultation.     1. Iron Deficiency Anemia without clear cause  - Possible etiologies: menorrhagia with regular cycle + low iron intake. However, history does not appear strong enough to explain degree of anemia.  - not a vegan or vegetarian. No malabsorptive conditions known. Menstrual cycle is 5 days with 1st 2 days heaviest. She uses a tampon + liner and changes Q2 hours on heavier days. No bleeding into urine or stools. No blood transfusion hx, IV iron hx. Not on PPI. No hiatal hernia hx.     - anemia present since at least 7/2021.   - 8/2023: WBC 6.99, Hgb 9.9, MCV 67, MCH 20, MCHC 29.7, PLT 258K, diff normal. Ferritin 5, iron 23, TIBC 391, iron sat 6%. CMP unrevealing.     09/2023: s/p IV Venofer 300mg weekly x4     05/2024: WBC 8.64, hemoglobin 1.9, MCV 72, platelets 236,000. Iron 110, iron saturation 38%, ferritin 22  - Positive fecal occult study  - UA positive occult blood however microscopic analysis showed normal UA RBC  - Hgb electrophoresis normal.   - Celiac disease panel normal  - CMP unrevealing    2. History of MS  On infusions Tysabri. Follows neurology. <1% risk of hematology d/o with med.    05/17/24 :  Lab Results   Component Value Date    IRON 110 05/09/2024    TIBC 292 05/09/2024    FERRITIN 22 05/09/2024     Lab Results   Component Value Date    WBC 8.64 05/09/2024    HGB 11.9 05/09/2024    HCT 38.4 05/09/2024    MCV 72 (L) 05/09/2024     05/09/2024       Interval history: She has never seen blood in stool or while wiping. No known hx of hemorrhoids.   She has monthly menses, last 6 days. Changes feminine product every 2 hours on her heaving days of bleeding.   She is not on iron supplementation   She had IUD in [place], has had  for 2 years without improvement in menses.       Review of Systems   Constitutional:  Positive for fatigue. Negative for activity change, appetite change, fever and unexpected weight change.   Gastrointestinal:  Negative for blood in stool.        +abdominal bloating     Genitourinary:  Positive for vaginal bleeding. Negative for hematuria.   Hematological:  Negative for adenopathy. Does not bruise/bleed easily.   All other systems reviewed and are negative.      Patient Active Problem List   Diagnosis    Patellofemoral syndrome, right    MS (multiple sclerosis) (HCC)    Weakness    Depression with anxiety    Iron deficiency anemia    Muscle cramping     Past Medical History:   Diagnosis Date    Anxiety     Depression     Multiple sclerosis (HCC)      Past Surgical History:   Procedure Laterality Date     SECTION      IR LUMBAR PUNCTURE  2021     No family history on file.  Social History     Socioeconomic History    Marital status: Single     Spouse name: None    Number of children: None    Years of education: None    Highest education level: None   Occupational History    None   Tobacco Use    Smoking status: Former     Current packs/day: 0.00     Average packs/day: 0.3 packs/day for 12.0 years (3.0 ttl pk-yrs)     Types: Cigarettes     Start date: 7/15/1999     Quit date: 2011     Years since quittin.8    Smokeless tobacco: Never   Vaping Use    Vaping status: Never Used   Substance and Sexual Activity    Alcohol use: Not Currently     Comment: socially with mom    Drug use: Yes     Types: Marijuana     Comment: not medical    Sexual activity: Yes     Partners: Male     Birth control/protection: I.U.D.   Other Topics Concern    None   Social History Narrative    None     Social Determinants of Health     Financial Resource Strain: Not on file   Food Insecurity: Not on file   Transportation Needs: Not on file   Physical Activity: Not on file   Stress: Not on file   Social Connections:  Not on file   Intimate Partner Violence: Not on file   Housing Stability: Not on file       Current Outpatient Medications:     buPROPion (WELLBUTRIN XL) 150 mg 24 hr tablet, Take 1 tablet by mouth every morning, Disp: , Rfl:     fluticasone (FLONASE) 50 mcg/act nasal spray, 2 sprays into each nostril daily, Disp: , Rfl:     gabapentin (NEURONTIN) 300 mg capsule, Take 1 capsule (300 mg total) by mouth 3 (three) times a day, Disp: 90 capsule, Rfl: 3    meloxicam (Mobic) 15 mg tablet, Take 1 tablet (15 mg total) by mouth daily With food, Disp: 45 tablet, Rfl: 2    methocarbamol (ROBAXIN) 750 mg tablet, Take 1 tablet (750 mg total) by mouth every 6 (six) hours as needed for muscle spasms, Disp: 120 tablet, Rfl: 3    famotidine (PEPCID) 20 mg tablet, Take 20 mg by mouth every evening (Patient not taking: Reported on 2/8/2024), Disp: , Rfl:     lidocaine (LIDODERM) 5 %, Apply 1 patch topically daily as needed (R. Knee pain) Remove & Discard patch within 12 hours or as directed by MD, Disp: 15 patch, Rfl: 0    meloxicam (MOBIC) 7.5 mg tablet, Take 1 tablet (7.5 mg total) by mouth daily (Patient not taking: Reported on 5/17/2024), Disp: 30 tablet, Rfl: 0    metroNIDAZOLE (FLAGYL) 500 mg tablet, TAKE 1 TABLET BY MOUTH TWICE DAILY FOR 7 DAYS (Patient not taking: Reported on 5/17/2024), Disp: , Rfl:   Allergies   Allergen Reactions    Bee Pollen Other (See Comments)     Other reaction(s): Nasal Congestion    Pollen Extract Nasal Congestion       Objective   /60 (BP Location: Left arm, Patient Position: Sitting, Cuff Size: Adult)   Pulse 77   Temp 98.3 °F (36.8 °C) (Temporal)   Resp 16   Wt 63.5 kg (140 lb)   SpO2 98%   BMI 26.02 kg/m²    Physical Exam  Vitals reviewed.   HENT:      Head: Normocephalic.   Cardiovascular:      Rate and Rhythm: Normal rate.   Pulmonary:      Effort: Pulmonary effort is normal.   Abdominal:      General: There is no distension.   Musculoskeletal:      Cervical back: Neck supple.    Skin:     Findings: No rash.   Neurological:      Mental Status: She is alert.         Result Review  Labs:  Appointment on 05/14/2024   Component Date Value Ref Range Status    OCCULT BLD, FECAL IMMUNOLOGICAL 05/14/2024 Positive (A)  Negative Final   Appointment on 05/09/2024   Component Date Value Ref Range Status    Color, UA 05/09/2024 Light Yellow   Final    Clarity, UA 05/09/2024 Clear   Final    Specific Gravity, UA 05/09/2024 1.015  1.003 - 1.030 Final    pH, UA 05/09/2024 5.5  4.5, 5.0, 5.5, 6.0, 6.5, 7.0, 7.5, 8.0 Final    Leukocytes, UA 05/09/2024 Negative  Negative Final    Nitrite, UA 05/09/2024 Negative  Negative Final    Protein, UA 05/09/2024 Negative  Negative mg/dl Final    Glucose, UA 05/09/2024 Negative  Negative mg/dl Final    Ketones, UA 05/09/2024 Negative  Negative mg/dl Final    Urobilinogen, UA 05/09/2024 <2.0  <2.0 mg/dl mg/dl Final    Bilirubin, UA 05/09/2024 Negative  Negative Final    Occult Blood, UA 05/09/2024 Trace (A)  Negative Final    Hgb F Quant 05/09/2024 0.0  0.0 - 2.0 % Final    Hgb A 05/09/2024 98.1  96.4 - 98.8 % Final    Hgb S Quant 05/09/2024 0.0  0.0 % Final    Hgb A2 Quant 05/09/2024 1.9  1.8 - 3.2 % Final    Hgb Interp. 05/09/2024 Comment   Final    Normal hemoglobin present; no hemoglobin variant or beta thalassemia  identified.  Note: Alpha thalassemia may not be detected by the Hgb Fractionation  Cascade panel. If alpha thalassemia is suspected, [facility] offers  Alpha-Thalassemia DNA Analysis (#[id number]).    WBC 05/09/2024 8.64  4.31 - 10.16 Thousand/uL Final    RBC 05/09/2024 5.35 (H)  3.81 - 5.12 Million/uL Final    Hemoglobin 05/09/2024 11.9  11.5 - 15.4 g/dL Final    Hematocrit 05/09/2024 38.4  34.8 - 46.1 % Final    MCV 05/09/2024 72 (L)  82 - 98 fL Final    MCH 05/09/2024 22.2 (L)  26.8 - 34.3 pg Final    MCHC 05/09/2024 31.0 (L)  31.4 - 37.4 g/dL Final    RDW 05/09/2024 16.1 (H)  11.6 - 15.1 % Final    MPV 05/09/2024 9.7  8.9 - 12.7 fL Final    Platelets  05/09/2024 236  149 - 390 Thousands/uL Final    nRBC 05/09/2024 0  /100 WBCs Final    Segmented % 05/09/2024 53  43 - 75 % Final    Immature Grans % 05/09/2024 0  0 - 2 % Final    Lymphocytes % 05/09/2024 36  14 - 44 % Final    Monocytes % 05/09/2024 8  4 - 12 % Final    Eosinophils Relative 05/09/2024 3  0 - 6 % Final    Basophils Relative 05/09/2024 0  0 - 1 % Final    Absolute Neutrophils 05/09/2024 4.50  1.85 - 7.62 Thousands/µL Final    Absolute Immature Grans 05/09/2024 0.03  0.00 - 0.20 Thousand/uL Final    Absolute Lymphocytes 05/09/2024 3.13  0.60 - 4.47 Thousands/µL Final    Absolute Monocytes 05/09/2024 0.67  0.17 - 1.22 Thousand/µL Final    Eosinophils Absolute 05/09/2024 0.28  0.00 - 0.61 Thousand/µL Final    Basophils Absolute 05/09/2024 0.03  0.00 - 0.10 Thousands/µL Final    Iron Saturation 05/09/2024 38  15 - 50 % Final    TIBC 05/09/2024 292  250 - 450 ug/dL Final    Iron 05/09/2024 110  50 - 212 ug/dL Final    Patients treated with metal-binding drugs (ie. Deferoxamine) may have depressed iron values.    UIBC 05/09/2024 182  155 - 355 ug/dL Final    Ferritin 05/09/2024 22  11 - 307 ng/mL Final    Deaminated Gliadin IgA Interpretat* 05/09/2024 Negative  Negative Final    Deaminated Gliadin IgA 05/09/2024 0.3  <15.0 U/mL Final    Deaminated Gliadin IgG 05/09/2024 Negative  Negative Final    Deaminated Gliadin IgG 05/09/2024 <0.4  <15.0 U/mL Final    Tissue Transglutaminase Ab IGG 05/09/2024 Negative  Negative Final    Tissue Transglutaminase Ab IGG 05/09/2024 1.1  <15.0 U/mL Final    Tissue Transglutaminase Ab IGA Int* 05/09/2024 Negative  Negative Final    Tissue Transglutaminase Ab IGA 05/09/2024 <0.5  <15.0 U/mL Final    IGA 05/09/2024 318  66 - 433 mg/dL Final    RBC, UA 05/09/2024 1-2  None Seen, 1-2 /hpf Final    WBC, UA 05/09/2024 1-2  None Seen, 1-2 /hpf Final    Epithelial Cells 05/09/2024 Occasional  None Seen, Occasional /hpf Final    Bacteria, UA 05/09/2024 None Seen  None Seen,  Occasional /hpf Final       Imaging:   I reviewed relevant imaging    Please note:  This report has been generated by a voice recognition software system. Therefore there may be syntax, spelling, and/or grammatical errors. Please call if you have any questions.

## 2024-05-17 NOTE — PATIENT INSTRUCTIONS
Call gastroenterology (GI) to set up appointment   PELVIC ultrasound   Iron infusions   Repeat labs in 2 months and urine study   Follow up in 2-3 months

## 2024-05-28 ENCOUNTER — TELEPHONE (OUTPATIENT)
Dept: NEUROLOGY | Facility: CLINIC | Age: 39
End: 2024-05-28

## 2024-05-28 NOTE — TELEPHONE ENCOUNTER
Pt is scheduled for Tysabri on 6/6/24.   Last infusion 5/10/24, infusions every 4 weeks.     Pt is not due for infusion until 6/7/24. Must be rescheduled.     Left detailed message for pt (okay per communication consent). Provided my direct line as well as phone # for infusion center. Will f/u to ensure infusion is scheduled correctly.

## 2024-05-30 ENCOUNTER — TELEPHONE (OUTPATIENT)
Dept: NEUROLOGY | Facility: CLINIC | Age: 39
End: 2024-05-30

## 2024-05-30 NOTE — TELEPHONE ENCOUNTER
Received VM 5/30/24 245 pm    My name is Charisse Bowman. You can reach me back at . And the reason for my phone call is because I need a letter or form stating I have multiple sclerosis for the [facility] office. For my benefits. I just need a form. However I can receive it just so I can email the form to them and know if you guys can email the form to them. Instead of me trying to have to do that. Either way, if you could just give me a call back and I'll just explain everything to you or you can let me know how i'll be able to receive this form. And I need it because they just told me that I still only thing that they need from the application. If I would have known this I would have called you guys sooner. But today I was just trying to finish up my stuff to needs take care of for aids and I was telling me that they needed from me. Okay, so thank you very much

## 2024-05-30 NOTE — TELEPHONE ENCOUNTER
Pt made aware of below, she is agreeable.     Called [facility] infusion center with pt on the line and spoke with Pippa. Rescheduled pt's Tysabri infusion to 6/12/24 @ 1:30pm.

## 2024-05-31 ENCOUNTER — HOSPITAL ENCOUNTER (OUTPATIENT)
Dept: ULTRASOUND IMAGING | Facility: HOSPITAL | Age: 39
End: 2024-05-31
Payer: COMMERCIAL

## 2024-05-31 ENCOUNTER — TELEPHONE (OUTPATIENT)
Dept: NEUROLOGY | Facility: CLINIC | Age: 39
End: 2024-05-31

## 2024-05-31 DIAGNOSIS — D50.0 IRON DEFICIENCY ANEMIA DUE TO CHRONIC BLOOD LOSS: ICD-10-CM

## 2024-05-31 DIAGNOSIS — G35 MS (MULTIPLE SCLEROSIS) (HCC): Primary | ICD-10-CM

## 2024-05-31 PROCEDURE — 76856 US EXAM PELVIC COMPLETE: CPT

## 2024-05-31 PROCEDURE — 76830 TRANSVAGINAL US NON-OB: CPT

## 2024-05-31 RX ORDER — SODIUM CHLORIDE 9 MG/ML
20 INJECTION, SOLUTION INTRAVENOUS ONCE
OUTPATIENT
Start: 2024-06-12

## 2024-05-31 RX ORDER — ACETAMINOPHEN 325 MG/1
650 TABLET ORAL ONCE
OUTPATIENT
Start: 2024-06-12 | End: 2024-06-12

## 2024-05-31 NOTE — TELEPHONE ENCOUNTER
Pt is scheduled for Tysabri on 6/12/24.   Last infusion 5/10/24, infusions every 4 weeks. Confirmed scheduling is correct (+5 days).      Last CBC 5/9/24 CMP 3/14/24  Last JCV 2/15/24 0.25 (final result negative)  Last MRI brain 5/28/23     Okay to proceed with infusion?     Tysabri is approved with P from 2/23/24 to 2/23/25 at [facility]. Auth # [id number].     Tysabri TOUCH auth valid until 7/30/24 at [facility].

## 2024-06-03 ENCOUNTER — TELEPHONE (OUTPATIENT)
Age: 39
End: 2024-06-03

## 2024-06-03 NOTE — TELEPHONE ENCOUNTER
Spoke to patient, I confirmed their upcoming appointment. Patient was informed of date/time and location of visit.

## 2024-06-05 NOTE — TELEPHONE ENCOUNTER
Spoke w/pt. She states a letter is perfect. Okay to send to [employer] portal.     Letter generated and sent to pt via [employer] portal.

## 2024-06-10 ENCOUNTER — CONSULT (OUTPATIENT)
Dept: GASTROENTEROLOGY | Facility: CLINIC | Age: 39
End: 2024-06-10
Payer: COMMERCIAL

## 2024-06-10 VITALS
WEIGHT: 143.8 LBS | OXYGEN SATURATION: 91 % | BODY MASS INDEX: 26.46 KG/M2 | TEMPERATURE: 98.5 F | SYSTOLIC BLOOD PRESSURE: 122 MMHG | HEART RATE: 81 BPM | DIASTOLIC BLOOD PRESSURE: 72 MMHG | HEIGHT: 62 IN

## 2024-06-10 DIAGNOSIS — D50.0 IRON DEFICIENCY ANEMIA DUE TO CHRONIC BLOOD LOSS: Primary | ICD-10-CM

## 2024-06-10 DIAGNOSIS — R14.0 BLOATING: Primary | ICD-10-CM

## 2024-06-10 DIAGNOSIS — G35 MS (MULTIPLE SCLEROSIS) (HCC): ICD-10-CM

## 2024-06-10 DIAGNOSIS — D50.0 IRON DEFICIENCY ANEMIA DUE TO CHRONIC BLOOD LOSS: ICD-10-CM

## 2024-06-10 PROCEDURE — 99203 OFFICE O/P NEW LOW 30 MIN: CPT | Performed by: PHYSICIAN ASSISTANT

## 2024-06-10 RX ORDER — SODIUM CHLORIDE 9 MG/ML
20 INJECTION, SOLUTION INTRAVENOUS ONCE
Status: CANCELLED | OUTPATIENT
Start: 2024-06-12

## 2024-06-10 NOTE — PATIENT INSTRUCTIONS
Scheduled date of EGD/colonoscopy (as of today):  Physician performing EGD/colonoscopy:  Location of EGD/colonoscopy:  Desired bowel prep reviewed with patient:  Instructions reviewed with patient by:  Clearances:

## 2024-06-10 NOTE — PROGRESS NOTES
[facility] - Outpatient Consultation  Charisse Bowman 39 y.o. female MRN: [id number]  Encounter: [id number]          ASSESSMENT AND PLAN:      1. Iron deficiency anemia due to chronic blood loss  Mostly likely related to menorrhagia  She will f/u with gyn  She is heme (+)  Will plan EGD and Colonoscopy    2. Bloating  Chronic  Bms are regular  She avoid dairy  Celiac panel is negative  Reviewed trial of probiotic  Consider trial of GFD or low FODMAP diet - information provided  ______________________________________________________________________    HPI: 39-year-old female with a history of multiple sclerosis, patellofemoral syndrome and anemia who presents for evaluation of heme positive stools.  Patient has been anemic for quite some time.  She recently started following with hematology.  She had stool testing which was positive for occult bleeding.  She denies noting any blood in her stool.  She denies any significant abdominal pain.  She does struggle with bloating.  This is a chronic symptom.  She admits that dairy such as creamer in her coffee would cause issues and so she has changed this to oat milk.  She also notices issues with breads and pastas.  Because of this she was tested for celiac and was found to be negative.  She reports that her bowel movements are mostly regular.  She has no rectal bleeding.  She has no unexpected weight loss.  She does report menorrhagia.  She has an IUD and despite this she reports the first 3 days of this.  Which happens at least once a month is very happy.  She has an appointment to see GYN later this summer.      REVIEW OF SYSTEMS:    CONSTITUTIONAL: Denies any fever, chills, rigors, and weight loss.  HEENT: No earache or tinnitus. Denies hearing loss or visual disturbances.  CARDIOVASCULAR: No chest pain or palpitations.   RESPIRATORY: Denies any cough, hemoptysis, shortness of breath or dyspnea on exertion.  GASTROINTESTINAL: As noted in the  "History of Present Illness.   GENITOURINARY: No problems with urination. Denies any hematuria or dysuria.  NEUROLOGIC: No dizziness or vertigo, denies headaches.   MUSCULOSKELETAL: Denies any muscle or joint pain.   SKIN: Denies skin rashes or itching.   ENDOCRINE: Denies excessive thirst. Denies intolerance to heat or cold.  PSYCHOSOCIAL: Denies depression or anxiety. Denies any recent memory loss.       Historical Information   Past Medical History:   Diagnosis Date    Anxiety     Depression     Multiple sclerosis (HCC)      Past Surgical History:   Procedure Laterality Date     SECTION      IR LUMBAR PUNCTURE  2021     Social History   Social History     Substance and Sexual Activity   Alcohol Use Not Currently    Comment: socially with mom     Social History     Substance and Sexual Activity   Drug Use Yes    Types: Marijuana    Comment: not medical     Social History     Tobacco Use   Smoking Status Former    Current packs/day: 0.00    Average packs/day: 0.3 packs/day for 12.0 years (3.0 ttl pk-yrs)    Types: Cigarettes    Start date: 7/15/1999    Quit date: 2011    Years since quittin.8   Smokeless Tobacco Never     No family history on file.    Meds/Allergies       Current Outpatient Medications:     buPROPion (WELLBUTRIN XL) 150 mg 24 hr tablet    fluticasone (FLONASE) 50 mcg/act nasal spray    gabapentin (NEURONTIN) 300 mg capsule    lidocaine (LIDODERM) 5 %    meloxicam (Mobic) 15 mg tablet    methocarbamol (ROBAXIN) 750 mg tablet    famotidine (PEPCID) 20 mg tablet    Allergies   Allergen Reactions    Bee Pollen Other (See Comments)     Other reaction(s): Nasal Congestion    Pollen Extract Nasal Congestion           Objective     Blood pressure 122/72, pulse 81, temperature 98.5 °F (36.9 °C), height 5' 1.5\" (1.562 m), weight 65.2 kg (143 lb 12.8 oz), SpO2 91%, not currently breastfeeding. Body mass index is 26.73 kg/m².        PHYSICAL EXAM:      General Appearance:   Alert, " cooperative, no distress   HEENT:   Normocephalic, atraumatic, anicteric.     Neck:  Supple, symmetrical, trachea midline   Lungs:   Clear to auscultation bilaterally; no rales, rhonchi or wheezing; respirations unlabored    Heart::   Regular rate and rhythm; no murmur, rub, or gallop.   Abdomen:   Soft, non-tender, non-distended; normal bowel sounds; no masses, no organomegaly    Genitalia:   Deferred    Rectal:   Deferred    Extremities:  No cyanosis, clubbing or edema    Pulses:  2+ and symmetric    Skin:  No jaundice, rashes, or lesions    Lymph nodes:  No palpable cervical lymphadenopathy        Lab Results:   No visits with results within 1 Day(s) from this visit.   Latest known visit with results is:   Appointment on 05/14/2024   Component Date Value    OCCULT BLD, FECAL IMMUNO* 05/14/2024 Positive (A)          Radiology Results:   No results found.  Answers submitted by the patient for this visit:  Abdominal Pain Questionnaire (Submitted on 6/5/2024)  Chief Complaint: Abdominal pain  Chronicity: chronic  Onset: more than 1 year ago  Onset quality: undetermined  Frequency: every several days  Episode duration: 10 Months  Progression since onset: waxing and waning  Pain location: right flank  Pain - numeric: 6/10  Pain quality: cramping  Radiates to: epigastric region  anorexia: No  arthralgias: Yes  belching: No  constipation: No  diarrhea: No  dysuria: No  fever: No  flatus: Yes  frequency: No  headaches: No  hematochezia: No  hematuria: No  melena: No  myalgias: Yes  nausea: No  weight loss: No  vomiting: No  Aggravated by: certain positions  Relieved by: movement

## 2024-06-12 ENCOUNTER — TELEPHONE (OUTPATIENT)
Age: 39
End: 2024-06-12

## 2024-06-12 ENCOUNTER — HOSPITAL ENCOUNTER (OUTPATIENT)
Dept: INFUSION CENTER | Facility: CLINIC | Age: 39
Discharge: HOME | End: 2024-06-12
Payer: COMMERCIAL

## 2024-06-12 VITALS
SYSTOLIC BLOOD PRESSURE: 116 MMHG | RESPIRATION RATE: 18 BRPM | HEART RATE: 72 BPM | DIASTOLIC BLOOD PRESSURE: 62 MMHG | TEMPERATURE: 97.1 F

## 2024-06-12 DIAGNOSIS — D50.0 IRON DEFICIENCY ANEMIA DUE TO CHRONIC BLOOD LOSS: Primary | ICD-10-CM

## 2024-06-12 DIAGNOSIS — G35 MS (MULTIPLE SCLEROSIS) (HCC): ICD-10-CM

## 2024-06-12 PROCEDURE — 96365 THER/PROPH/DIAG IV INF INIT: CPT

## 2024-06-12 PROCEDURE — 96367 TX/PROPH/DG ADDL SEQ IV INF: CPT

## 2024-06-12 RX ORDER — SODIUM CHLORIDE 9 MG/ML
20 INJECTION, SOLUTION INTRAVENOUS ONCE
Status: COMPLETED | OUTPATIENT
Start: 2024-06-12 | End: 2024-06-12

## 2024-06-12 RX ORDER — ACETAMINOPHEN 325 MG/1
650 TABLET ORAL ONCE
Status: COMPLETED | OUTPATIENT
Start: 2024-06-12 | End: 2024-06-12

## 2024-06-12 RX ORDER — ACETAMINOPHEN 325 MG/1
650 TABLET ORAL ONCE
OUTPATIENT
Start: 2024-07-10 | End: 2024-07-10

## 2024-06-12 RX ORDER — SODIUM CHLORIDE 9 MG/ML
20 INJECTION, SOLUTION INTRAVENOUS ONCE
OUTPATIENT
Start: 2024-07-10

## 2024-06-12 RX ORDER — SODIUM CHLORIDE 9 MG/ML
20 INJECTION, SOLUTION INTRAVENOUS ONCE
Status: CANCELLED | OUTPATIENT
Start: 2024-06-19

## 2024-06-12 RX ADMIN — IRON SUCROSE 300 MG: 20 INJECTION, SOLUTION INTRAVENOUS at 16:12

## 2024-06-12 RX ADMIN — DIPHENHYDRAMINE HYDROCHLORIDE 50 MG: 50 INJECTION, SOLUTION INTRAMUSCULAR; INTRAVENOUS at 14:00

## 2024-06-12 RX ADMIN — SODIUM CHLORIDE 20 ML/HR: 0.9 INJECTION, SOLUTION INTRAVENOUS at 14:02

## 2024-06-12 RX ADMIN — ACETAMINOPHEN 650 MG: 325 TABLET, FILM COATED ORAL at 14:00

## 2024-06-12 RX ADMIN — NATALIZUMAB 300 MG: 300 INJECTION INTRAVENOUS at 14:49

## 2024-06-12 RX ADMIN — SODIUM CHLORIDE 20 ML/HR: 0.9 INJECTION, SOLUTION INTRAVENOUS at 14:51

## 2024-06-12 NOTE — PROGRESS NOTES
Pt here for tysabri, and venofer infusion, offering no complaints. Peripheral IV placed with positive blood return noted. Tolerating infusion so far report given to Latanya Bates.

## 2024-06-12 NOTE — PROGRESS NOTES
Patient tolerated remainder of Venofer infusion without complication. PIV removed.     Next appointment: 6/19/24 @ 1500

## 2024-06-12 NOTE — TELEPHONE ENCOUNTER
Pt called to schedule colon/egd [facility] orders placed by LIZETH Doherty. The date she wanted was not available. Pt will check a few dates w/ her daughter and call back to schedule.

## 2024-06-12 NOTE — TELEPHONE ENCOUNTER
Scheduled date of EGD/colonoscopy (as of today):06.24.24    Physician performing EGD/colonoscopy:Bailey    Location of EGD/colonoscopy:[place]    Desired bowel prep reviewed with patient:[place]/Shan    Instructions reviewed with patient by:Lsims and sent to chart

## 2024-06-13 ENCOUNTER — OFFICE VISIT (OUTPATIENT)
Dept: OBGYN CLINIC | Facility: CLINIC | Age: 39
End: 2024-06-13
Payer: COMMERCIAL

## 2024-06-13 VITALS
HEART RATE: 80 BPM | DIASTOLIC BLOOD PRESSURE: 71 MMHG | HEIGHT: 62 IN | SYSTOLIC BLOOD PRESSURE: 111 MMHG | BODY MASS INDEX: 26.39 KG/M2 | WEIGHT: 143.4 LBS

## 2024-06-13 DIAGNOSIS — M22.2X1 PATELLOFEMORAL SYNDROME, RIGHT: Primary | ICD-10-CM

## 2024-06-13 DIAGNOSIS — M23.91 INTERNAL DERANGEMENT OF RIGHT KNEE: ICD-10-CM

## 2024-06-13 PROCEDURE — 20610 DRAIN/INJ JOINT/BURSA W/O US: CPT | Performed by: ORTHOPAEDIC SURGERY

## 2024-06-13 PROCEDURE — 99213 OFFICE O/P EST LOW 20 MIN: CPT | Performed by: ORTHOPAEDIC SURGERY

## 2024-06-13 RX ORDER — BUPIVACAINE HYDROCHLORIDE 2.5 MG/ML
2 INJECTION, SOLUTION INFILTRATION; PERINEURAL
Status: COMPLETED | OUTPATIENT
Start: 2024-06-13 | End: 2024-06-13

## 2024-06-13 RX ORDER — METHYLPREDNISOLONE ACETATE 40 MG/ML
2 INJECTION, SUSPENSION INTRA-ARTICULAR; INTRALESIONAL; INTRAMUSCULAR; SOFT TISSUE
Status: COMPLETED | OUTPATIENT
Start: 2024-06-13 | End: 2024-06-13

## 2024-06-13 RX ADMIN — BUPIVACAINE HYDROCHLORIDE 2 ML: 2.5 INJECTION, SOLUTION INFILTRATION; PERINEURAL at 10:30

## 2024-06-13 RX ADMIN — METHYLPREDNISOLONE ACETATE 2 ML: 40 INJECTION, SUSPENSION INTRA-ARTICULAR; INTRALESIONAL; INTRAMUSCULAR; SOFT TISSUE at 10:30

## 2024-06-13 NOTE — PROGRESS NOTES
Orthopaedics Office Visit - Established Patient Visit    ASSESSMENT/PLAN:    Assessment:   Internal derangement, posterolateral tenderness upon exam today   Right Knee Patellofemoral Syndrome/osteoarthritis  Quadriceps and gluteus weakness on right    Multiple sclerosis  Iron deficient     Plan:   Patient to be full weightbearing to  RLE (Right Lower Extremity)  ROM as tolerated to RLE (Right Lower Extremity)  Discussed with patient continued use of compression sleeve for symptom management, repeat CSI for diagnostic and therapeutic purposes, MRI study for recurrent symptoms despite physical therapy, home exercise plan, over the counter medications.   Patient to continue with physical therapy for strength and mobility training, additional home exercises were provided to focus on quadriceps and gluteus strengthening   An MRI study was ordered for further evaluation of right knee.  Patient to continue at home analgesic regimen with Tylenol and Meloxicam   The patient was offered a corticosteroid injection for their right knee. They tolerated the procedure well.  The patient was educated they may have some irritation in the next few days and should rest, ice, elevate and perform gentle range of motion exercises. They were advised the medicine should begin to work in a few days time.    Patient to follow up upon completion of MRI       To Do Next Visit:  Re-evaluation, review right knee MRI     _____________________________________________________  CHIEF COMPLAINT:  Chief Complaint   Patient presents with   • Right Knee - Follow-up         SUBJECTIVE:  Charisse Bowman is a 39 y.o. female who presents for follow-up of right knee patellofemoral disorder. She was provided a Roberto-pull knee brace, but feels restricted in it. She has transitioned to a compression sleeve. Pain is always present. She does not take anything for pain. When pain worsens she rests, elevates and ice. She goes to physical therapy every Tuesday and  Thursday at [facility] for her Multiple Sclerosis and it is full body focused. She denies any locking, catching or instability recently but has previously experienced these symptoms.      PAST MEDICAL HISTORY:  Past Medical History:   Diagnosis Date   • Anxiety    • Depression    • Multiple sclerosis (HCC)        PAST SURGICAL HISTORY:  Past Surgical History:   Procedure Laterality Date   •  SECTION     • IR LUMBAR PUNCTURE  2021       FAMILY HISTORY:  History reviewed. No pertinent family history.    SOCIAL HISTORY:  Social History     Tobacco Use   • Smoking status: Former     Current packs/day: 0.00     Average packs/day: 0.3 packs/day for 12.0 years (3.0 ttl pk-yrs)     Types: Cigarettes     Start date: 7/15/1999     Quit date: 2011     Years since quittin.8   • Smokeless tobacco: Never   Vaping Use   • Vaping status: Never Used   Substance Use Topics   • Alcohol use: Not Currently     Comment: socially with mom   • Drug use: Yes     Types: Marijuana     Comment: not medical       MEDICATIONS:    Current Outpatient Medications:   •  buPROPion (WELLBUTRIN XL) 150 mg 24 hr tablet, Take 1 tablet by mouth every morning, Disp: , Rfl:   •  fluticasone (FLONASE) 50 mcg/act nasal spray, 2 sprays into each nostril daily, Disp: , Rfl:   •  gabapentin (NEURONTIN) 300 mg capsule, Take 1 capsule (300 mg total) by mouth 3 (three) times a day, Disp: 90 capsule, Rfl: 3  •  meloxicam (Mobic) 15 mg tablet, Take 1 tablet (15 mg total) by mouth daily With food, Disp: 45 tablet, Rfl: 2  •  methocarbamol (ROBAXIN) 750 mg tablet, Take 1 tablet (750 mg total) by mouth every 6 (six) hours as needed for muscle spasms, Disp: 120 tablet, Rfl: 3  •  lidocaine (LIDODERM) 5 %, Apply 1 patch topically daily as needed (R. Knee pain) Remove & Discard patch within 12 hours or as directed by MD, Disp: 15 patch, Rfl: 0  No current facility-administered medications for this visit.    Facility-Administered Medications  "Ordered in Other Visits:   •  alteplase (CATHFLO) injection 2 mg, 2 mg, Intracatheter, Q1MIN PRN, Nadeen Logan MD    ALLERGIES:  Allergies   Allergen Reactions   • Bee Pollen Other (See Comments)     Other reaction(s): Nasal Congestion   • Pollen Extract Nasal Congestion       REVIEW OF SYSTEMS:  MSK: as noted in HPI  Neuro: WNL's  Pertinent items are otherwise noted in HPI.  A comprehensive review of systems was otherwise negative.    LABS:  HgA1c: No results found for: \"HGBA1C\"  BMP:   Lab Results   Component Value Date    CALCIUM 9.3 03/14/2024    K 3.6 03/14/2024    CO2 25 03/14/2024     03/14/2024    BUN 13 03/14/2024    CREATININE 0.70 03/14/2024     CBC: No components found for: \"CBC\"    _____________________________________________________  PHYSICAL EXAMINATION:  Vital signs: /71   Pulse 80   Ht 5' 1.5\" (1.562 m)   Wt 65 kg (143 lb 6.4 oz)   BMI 26.66 kg/m²   General: No acute distress, awake and alert  Psychiatric: Mood and affect appear appropriate  HEENT: Trachea Midline, No torticollis, no apparent facial trauma  Cardiovascular: No audible murmurs; Extremities appear perfused  Pulmonary: No audible wheezing or stridor  Skin: No open lesions; see further details (if any) below    MUSCULOSKELETAL EXAMINATION:    Extremities:    Right Knee  Alignment neutral  There is no effusion.  There is tenderness over the patellofemoral space, posterolateral knee, Baker's cyst, quadriceps insertion.    Range of motion from 0 to 125.    There is minimal crepitus with range of motion.   There is 5-/5 quadriceps strength and decreased tone in comparison to contralateral.    The patient is able to perform a straight leg raise.    Stable to valgus and varus stress.   No ligament laxity   dEwina's testing equivocal   Patellar grind testing is positive  Toes are pink and mobile  Compartments are soft  Brisk capillary refill  Sensation intact  The patient is neurovascular intact " "distally.    _____________________________________________________  STUDIES REVIEWED:  I personally reviewed the images and interpretation is as follows:    No new imaging to review       PROCEDURES PERFORMED:  Large joint arthrocentesis: R knee  Universal Protocol:  Consent: Verbal consent obtained.  Risks and benefits: risks, benefits and alternatives were discussed  Consent given by: patient  Time out: Immediately prior to procedure a \"time out\" was called to verify the correct patient, procedure, equipment, support staff and site/side marked as required.  Patient understanding: patient states understanding of the procedure being performed  Site marked: the operative site was marked  Patient identity confirmed: verbally with patient  Supporting Documentation  Indications: pain   Procedure Details  Location: knee - R knee  Preparation: Patient was prepped and draped in the usual sterile fashion  Needle size: 22 G  Ultrasound guidance: no  Approach: anteromedial  Medications administered: 2 mL bupivacaine 0.25 %; 2 mL methylPREDNISolone acetate 40 mg/mL    Patient tolerance: patient tolerated the procedure well with no immediate complications  Dressing:  Sterile dressing applied            Scribe Attestation    I,:  Geraldine Ramirez am acting as a scribe while in the presence of the attending physician.:       I,:  Terrence Silva MD personally performed the services described in this documentation    as scribed in my presence.:           "

## 2024-06-13 NOTE — PATIENT INSTRUCTIONS
Home exercises     Quad sets:  Flatten out knee by tightening quad muscles.  Hold for 3 seconds.  10-15 reps  3 sets          Also perform above exercises with toes turned toward the outside.        Also perform above exercises with toes turned toward the outside.        Short arc quad sets:  Hold for 3 seconds at the top.  Perform 10-15 reps  3 sets           Glute bridges:  Hips up and squeeze buttocks  Advance with single leg glute bridge once glute bridges are easy.  Hold buttocks for 3-5 seconds at the top  Perform 10-15 reps  3 sets         Clam Shells  Lay on your side.  Perform without resistance at knees to begin.  Engage your core and lift your side off the floor.  Lift your knee in the air by using your glute muscles. Foot/ankle does not come off the other ankle.  3 x 10  Hold for 3 seconds at the top.  Progress to resisted clam shell with resiance band at knees       Progress to planked clam shells   Perform kneeling side plank  At the top of the side plan perform clam shell   3 x 10   Hold for 3 seconds at the top           [place]er Dead lifts   Start with a Proper Setup:  Stand with your feet hip-width apart and the barbell in front of you.  Hinge at the hips, keeping your back straight and shoulders over the bar.  Maintain a slight bend in your knees.  Hinge at the Hips:  Imagine pushing your hips back as if closing a car door with your butt.  Keep your back flat and engage your core.  Use an Overhand , Keep the Bar Close, and Lift:   the bar with an overhand  (palms facing you) just outside your thighs.  Keep the bar close to your body throughout the movement.  Lift by driving your feet through the floor and extending your hips.  Pause at the Top:  Stand up fully, squeezing your glutes at the top.  Maintain a neutral spine and avoid hyperextending your lower back.    3 x 10      Progress to single leg [place]er Dead Lift   Stand on injured side with a soft bent knee   Perform without  weight in the early phases and progress to a single dumbbell in the opposite hand.   3 x 10       Bird dogs   On all 4's lift opposite arm and opposite leg while engaging core  3 x 10   Hold for 3 seconds at the top

## 2024-06-18 ENCOUNTER — TELEMEDICINE (OUTPATIENT)
Age: 39
End: 2024-06-18
Payer: COMMERCIAL

## 2024-06-18 VITALS — BODY MASS INDEX: 26.66 KG/M2 | HEIGHT: 62 IN

## 2024-06-18 DIAGNOSIS — G35 MS (MULTIPLE SCLEROSIS) (HCC): Primary | ICD-10-CM

## 2024-06-18 PROCEDURE — 99213 OFFICE O/P EST LOW 20 MIN: CPT

## 2024-06-18 NOTE — ASSESSMENT & PLAN NOTE
Charisse Bowman is a 39 year old female with Multiple Sclerosis maintained on Tysabri 300 mg q 4 weeks. She is tolerating this regimen well with no new or worsening neurologic complaints concerning for disease progression. She continues to be JCV negative. She is due for updated surveillance MRI Brain, Cervical and Thoracic spine, last completed 5/2023 prior to transition to Tysabri. Although she lacks any acute symptoms suspicious for relapse, I would still like for her to proceed with MRIs with contrast given at the time of last significant relapse she denied any symptoms. CMP should be drawn prior to MRI to assess renal function, as well as for routine surveillance given last was > 3 months ago. Orders entered today. Due to persistent muscle cramping, she was advised to try methocarbamol qid, she should send a message with an update in 2 weeks. If not improved, then will consider a transition to flexeril or tizanidine although discussed this is more likely to be sedating than methocarbamol. Other option discussed is increasing gabapentin further. I have requested an in person follow up in 3-4 months, so that she may have a full neurologic exam to assess her strength, gait etc.    Plan:  - Continue Tysabri q 4 weeks  - CMP  - Updated MRIs B/C/T  - Continue with PT  - Increase methocarbamol to 4 times daily  - Send me a [employer] message with an update in 2 weeks; may consider an alternative muscle relaxant or increase in Gabapentin  - Follow up in person in 3-4 months in [place]

## 2024-06-18 NOTE — PROGRESS NOTES
Virtual Regular Visit    Verification of patient location:    Patient is located at Home in the following state in which I hold an active license [place]      Assessment/Plan:    Problem List Items Addressed This Visit          Nervous and Auditory    MS (multiple sclerosis) (HCC) - Primary     Charisse Bowman is a 39 year old female with Multiple Sclerosis maintained on Tysabri 300 mg q 4 weeks. She is tolerating this regimen well with no new or worsening neurologic complaints concerning for disease progression. She continues to be JCV negative. She is due for updated surveillance MRI Brain, Cervical and Thoracic spine, last completed 5/2023 prior to transition to Tysabri. Although she lacks any acute symptoms suspicious for relapse, I would still like for her to proceed with MRIs with contrast given at the time of last significant relapse she denied any symptoms. CMP should be drawn prior to MRI to assess renal function, as well as for routine surveillance given last was > 3 months ago. Orders entered today. Due to persistent muscle cramping, she was advised to try methocarbamol qid, she should send a message with an update in 2 weeks. If not improved, then will consider a transition to flexeril or tizanidine although discussed this is more likely to be sedating than methocarbamol. Other option discussed is increasing gabapentin further. I have requested an in person follow up in 3-4 months, so that she may have a full neurologic exam to assess her strength, gait etc.    Plan:  - Continue Tysabri q 4 weeks  - CMP  - Updated MRIs B/C/T  - Continue with PT  - Increase methocarbamol to 4 times daily  - Send me a [employer] message with an update in 2 weeks; may consider an alternative muscle relaxant or increase in Gabapentin  - Follow up in person in 3-4 months in [place]         Relevant Orders    MRI brain MS wo and w contrast    MRI cervical spine with and without contrast    MRI thoracic spine with and without contrast     Comprehensive metabolic panel            Reason for visit is   Chief Complaint   Patient presents with    Virtual Regular Visit          Encounter provider ANEESH Khan      Recent Visits  No visits were found meeting these conditions.  Showing recent visits within past 7 days and meeting all other requirements  Today's Visits  Date Type Provider Dept   06/18/24 Telemedicine ANEESH Khan [facility] [place]   Showing today's visits and meeting all other requirements  Future Appointments  No visits were found meeting these conditions.  Showing future appointments within next 150 days and meeting all other requirements       The patient was identified by name and date of birth. Charisse Bowman was informed that this is a telemedicine visit and that the visit is being conducted through the Epic Embedded platform. She agrees to proceed..  My office door was closed. No one else was in the room.  She acknowledged consent and understanding of privacy and security of the video platform. The patient has agreed to participate and understands they can discontinue the visit at any time.    Patient is aware this is a billable service.     Subjective  Charisse Bowman is a 39 y.o. female  known to the practice for Multiple Sclerosis. She was last seen 2/8/2024 via telemedicine. She is maintained on Tysabri q 4 weeks initiated 7/31/2023. Last infusion was 6/12/2024. She denies any infusion reactions and has tolerated her infusions well with no adverse effects.      2/22/2024 JCV 0.25 negative final result  5/9/2024 CBC and differential  3/14/2024 CMP     She denies any new numbness, tingling, weakness, dizziness, headaches, vision changes, or changes in bowel or bladder. She denies any recent infections, UTIs, URIs, etc. She is vaccinated against the flu but has not had a recent covid-19 booster.     Unfortunately she does continue with significant cramping of her hands and right knee down to her foot.  At the time of her last visit she was using Baclofen 10 mg bid and 20 mg at bedtime and Gabapentin 100 mg bid and 600 mg qhs with only partial relief. She was advised to transition Baclofen to Methocarbamol 750 mg q6 hours. Today she tells me she is using this TID instead of QID and has not had any significant relief. She continues with cramping and tingling in her hands and feet. She is also now receiving iron infusions, with her next being tomorrow 24. She also reports her right leg continues to be weaker than her left leg. She is still ambulating with no assistive devices. She is attending physical therapy twice a week and is also following with Ortho for right patellofemoral syndrome which is also likely contributory. At the time of her last visit she was feeling more depressed with the loss of her father (2023). Today she tells me his brother (her uncle) just recently passed away as well, so she continues to grieve.     She denies any new acute focal symptoms to suggest MS relapse.       HPI     Past Medical History:   Diagnosis Date    Anxiety     Depression     Multiple sclerosis (HCC)        Past Surgical History:   Procedure Laterality Date     SECTION      IR LUMBAR PUNCTURE  2021       Current Outpatient Medications   Medication Sig Dispense Refill    buPROPion (WELLBUTRIN XL) 150 mg 24 hr tablet Take 1 tablet by mouth every morning      fluticasone (FLONASE) 50 mcg/act nasal spray 2 sprays into each nostril daily      gabapentin (NEURONTIN) 300 mg capsule Take 1 capsule (300 mg total) by mouth 3 (three) times a day 90 capsule 3    lidocaine (LIDODERM) 5 % Apply 1 patch topically daily as needed (R. Knee pain) Remove & Discard patch within 12 hours or as directed by MD 15 patch 0    meloxicam (Mobic) 15 mg tablet Take 1 tablet (15 mg total) by mouth daily With food 45 tablet 2    methocarbamol (ROBAXIN) 750 mg tablet Take 1 tablet (750 mg total) by mouth every 6 (six) hours as  "needed for muscle spasms 120 tablet 3     No current facility-administered medications for this visit.        Allergies   Allergen Reactions    Bee Pollen Other (See Comments)     Other reaction(s): Nasal Congestion    Pollen Extract Nasal Congestion       Review of Systems  Review of Systems   Constitutional:  Negative for appetite change, fatigue and fever.   HENT: Negative.  Negative for hearing loss, tinnitus, trouble swallowing and voice change.    Eyes: Negative.  Negative for photophobia, pain and visual disturbance.   Respiratory: Negative.  Negative for shortness of breath.    Cardiovascular: Negative.  Negative for palpitations.   Gastrointestinal: Negative.  Negative for nausea and vomiting.   Endocrine: Negative.  Negative for cold intolerance.   Genitourinary: Negative.  Negative for dysuria, frequency and urgency.   Musculoskeletal:  Negative for back pain, gait problem, myalgias, neck pain and neck stiffness.        Pt says she has hand pain and cramping ( goes to foot and cramps up)   Skin: Negative.  Negative for rash.   Allergic/Immunologic: Negative.    Neurological: Negative.  Negative for dizziness, tremors, seizures, syncope, facial asymmetry, speech difficulty, weakness, light-headedness, numbness and headaches.   Hematological: Negative.  Does not bruise/bleed easily.   Psychiatric/Behavioral: Negative.  Negative for confusion, hallucinations and sleep disturbance.    All other systems reviewed and are negative.    Reviewed ROS as entered by medical assistant.    Video Exam    Vitals:    06/18/24 1309   Height: 5' 1.5\" (1.562 m)       Physical Exam     On neurological examination the patient was awake, alert, attentive, oriented to person, place, and time. Recent and remote memory intact to conversation with no evidence of language dysfunction. Satisfactory fund of knowledge. Normal attention span and concentration.  Mood, affect and judgement are appropriate. Speech is fluent without " dysarthria or aphasia. Face appears symmetric, with no obvious weakness noted.  Audition is intact to casual conversation.     Visit Time  Total Visit Duration: 20 minutes

## 2024-06-18 NOTE — PROGRESS NOTES
Patient ID: Charisse Bowman is a 39 y.o. female.    Assessment/Plan:    No problem-specific Assessment & Plan notes found for this encounter.       {Assess/PlanSmartLinks:48141}       Subjective:    HPI    {[facility] HPI texts:41626}    {Common ambulatory SmartLinks:24228}         Objective:    not currently breastfeeding.    Physical Exam    Neurological Exam      ROS:    Review of Systems   Constitutional:  Negative for appetite change, fatigue and fever.   HENT: Negative.  Negative for hearing loss, tinnitus, trouble swallowing and voice change.    Eyes: Negative.  Negative for photophobia, pain and visual disturbance.   Respiratory: Negative.  Negative for shortness of breath.    Cardiovascular: Negative.  Negative for palpitations.   Gastrointestinal: Negative.  Negative for nausea and vomiting.   Endocrine: Negative.  Negative for cold intolerance.   Genitourinary: Negative.  Negative for dysuria, frequency and urgency.   Musculoskeletal:  Negative for back pain, gait problem, myalgias, neck pain and neck stiffness.        Pt says she has hand pain and cramping ( goes to foot and cramps up)   Skin: Negative.  Negative for rash.   Allergic/Immunologic: Negative.    Neurological: Negative.  Negative for dizziness, tremors, seizures, syncope, facial asymmetry, speech difficulty, weakness, light-headedness, numbness and headaches.   Hematological: Negative.  Does not bruise/bleed easily.   Psychiatric/Behavioral: Negative.  Negative for confusion, hallucinations and sleep disturbance.    All other systems reviewed and are negative.

## 2024-06-18 NOTE — PATIENT INSTRUCTIONS
- Continue Tysabri q 4 weeks  - CMP  - Updated MRIs B/C/T  - Continue with PT  - Increase methocarbamol to 4 times daily  - Send me a [employer] message with an update in 2 weeks; may consider an alternative muscle relaxant or increase in Gabapentin  - Follow up in person in 3-4 months in [place]

## 2024-06-19 ENCOUNTER — HOSPITAL ENCOUNTER (OUTPATIENT)
Dept: INFUSION CENTER | Facility: CLINIC | Age: 39
Discharge: HOME | End: 2024-06-19
Payer: COMMERCIAL

## 2024-06-19 VITALS — SYSTOLIC BLOOD PRESSURE: 132 MMHG | TEMPERATURE: 97.2 F | DIASTOLIC BLOOD PRESSURE: 55 MMHG | RESPIRATION RATE: 18 BRPM

## 2024-06-19 DIAGNOSIS — G35 MS (MULTIPLE SCLEROSIS) (HCC): Primary | ICD-10-CM

## 2024-06-19 DIAGNOSIS — D50.0 IRON DEFICIENCY ANEMIA DUE TO CHRONIC BLOOD LOSS: ICD-10-CM

## 2024-06-19 RX ORDER — SODIUM CHLORIDE 9 MG/ML
20 INJECTION, SOLUTION INTRAVENOUS ONCE
Status: COMPLETED | OUTPATIENT
Start: 2024-06-19 | End: 2024-06-19

## 2024-06-19 RX ORDER — SODIUM CHLORIDE 9 MG/ML
20 INJECTION, SOLUTION INTRAVENOUS ONCE
Status: CANCELLED | OUTPATIENT
Start: 2024-06-19

## 2024-06-19 RX ADMIN — SODIUM CHLORIDE 20 ML/HR: 0.9 INJECTION, SOLUTION INTRAVENOUS at 15:30

## 2024-06-19 RX ADMIN — IRON SUCROSE 300 MG: 20 INJECTION, SOLUTION INTRAVENOUS at 15:38

## 2024-06-19 NOTE — PROGRESS NOTES
Pt to clinic for venofer, offers no complaints today, currently tolerating infusion without complications, aware of next appointment on 7/10/24 at 3pm,report given to Latanya Bates RN.

## 2024-06-19 NOTE — PROGRESS NOTES
Pt tolerated remainder of the infusion without issue. PIV removed. Pt left clinic in stable condition.

## 2024-06-21 ENCOUNTER — TELEPHONE (OUTPATIENT)
Dept: NEUROLOGY | Facility: CLINIC | Age: 39
End: 2024-06-21

## 2024-06-21 NOTE — TELEPHONE ENCOUNTER
Pt is scheduled for [facility] on 7/10/24. Due to NPI/ Tax ID change for [place] site, new PA is needed. Will need to switch site to [facility] to match correct NPI/Tax ID #s.     PA faxed. Awaiting determination.

## 2024-06-24 ENCOUNTER — ANESTHESIA (OUTPATIENT)
Dept: GASTROENTEROLOGY | Facility: HOSPITAL | Age: 39
End: 2024-06-24

## 2024-06-24 ENCOUNTER — ANESTHESIA EVENT (OUTPATIENT)
Dept: GASTROENTEROLOGY | Facility: HOSPITAL | Age: 39
End: 2024-06-24

## 2024-06-24 ENCOUNTER — HOSPITAL ENCOUNTER (OUTPATIENT)
Dept: GASTROENTEROLOGY | Facility: HOSPITAL | Age: 39
Setting detail: OUTPATIENT SURGERY
Discharge: HOME | End: 2024-06-24
Payer: COMMERCIAL

## 2024-06-24 VITALS
WEIGHT: 140 LBS | BODY MASS INDEX: 26.43 KG/M2 | DIASTOLIC BLOOD PRESSURE: 71 MMHG | RESPIRATION RATE: 14 BRPM | HEART RATE: 70 BPM | SYSTOLIC BLOOD PRESSURE: 110 MMHG | TEMPERATURE: 97.6 F | HEIGHT: 61 IN | OXYGEN SATURATION: 100 %

## 2024-06-24 DIAGNOSIS — D50.0 IRON DEFICIENCY ANEMIA DUE TO CHRONIC BLOOD LOSS: ICD-10-CM

## 2024-06-24 DIAGNOSIS — R14.0 BLOATING: ICD-10-CM

## 2024-06-24 PROCEDURE — 88313 SPECIAL STAINS GROUP 2: CPT | Performed by: STUDENT IN AN ORGANIZED HEALTH CARE EDUCATION/TRAINING PROGRAM

## 2024-06-24 PROCEDURE — 88305 TISSUE EXAM BY PATHOLOGIST: CPT | Performed by: STUDENT IN AN ORGANIZED HEALTH CARE EDUCATION/TRAINING PROGRAM

## 2024-06-24 PROCEDURE — 45385 COLONOSCOPY W/LESION REMOVAL: CPT | Performed by: INTERNAL MEDICINE

## 2024-06-24 PROCEDURE — 88342 IMHCHEM/IMCYTCHM 1ST ANTB: CPT | Performed by: STUDENT IN AN ORGANIZED HEALTH CARE EDUCATION/TRAINING PROGRAM

## 2024-06-24 PROCEDURE — 43239 EGD BIOPSY SINGLE/MULTIPLE: CPT | Performed by: INTERNAL MEDICINE

## 2024-06-24 RX ORDER — SODIUM CHLORIDE, SODIUM LACTATE, POTASSIUM CHLORIDE, CALCIUM CHLORIDE 600; 310; 30; 20 MG/100ML; MG/100ML; MG/100ML; MG/100ML
INJECTION, SOLUTION INTRAVENOUS CONTINUOUS PRN
Status: DISCONTINUED | OUTPATIENT
Start: 2024-06-24 | End: 2024-06-24

## 2024-06-24 RX ORDER — LIDOCAINE HYDROCHLORIDE 20 MG/ML
INJECTION, SOLUTION EPIDURAL; INFILTRATION; INTRACAUDAL; PERINEURAL AS NEEDED
Status: DISCONTINUED | OUTPATIENT
Start: 2024-06-24 | End: 2024-06-24

## 2024-06-24 RX ORDER — PROPOFOL 10 MG/ML
INJECTION, EMULSION INTRAVENOUS AS NEEDED
Status: DISCONTINUED | OUTPATIENT
Start: 2024-06-24 | End: 2024-06-24

## 2024-06-24 RX ADMIN — SODIUM CHLORIDE, SODIUM LACTATE, POTASSIUM CHLORIDE, AND CALCIUM CHLORIDE: .6; .31; .03; .02 INJECTION, SOLUTION INTRAVENOUS at 14:46

## 2024-06-24 RX ADMIN — PROPOFOL 30 MG: 10 INJECTION, EMULSION INTRAVENOUS at 15:07

## 2024-06-24 RX ADMIN — PROPOFOL 30 MG: 10 INJECTION, EMULSION INTRAVENOUS at 15:03

## 2024-06-24 RX ADMIN — PROPOFOL 30 MG: 10 INJECTION, EMULSION INTRAVENOUS at 15:16

## 2024-06-24 RX ADMIN — PROPOFOL 30 MG: 10 INJECTION, EMULSION INTRAVENOUS at 15:10

## 2024-06-24 RX ADMIN — PROPOFOL 30 MG: 10 INJECTION, EMULSION INTRAVENOUS at 15:00

## 2024-06-24 RX ADMIN — PROPOFOL 30 MG: 10 INJECTION, EMULSION INTRAVENOUS at 15:05

## 2024-06-24 RX ADMIN — PROPOFOL 120 MG: 10 INJECTION, EMULSION INTRAVENOUS at 14:55

## 2024-06-24 RX ADMIN — PROPOFOL 20 MG: 10 INJECTION, EMULSION INTRAVENOUS at 15:08

## 2024-06-24 RX ADMIN — PROPOFOL 20 MG: 10 INJECTION, EMULSION INTRAVENOUS at 14:57

## 2024-06-24 RX ADMIN — PROPOFOL 30 MG: 10 INJECTION, EMULSION INTRAVENOUS at 15:18

## 2024-06-24 RX ADMIN — PROPOFOL 30 MG: 10 INJECTION, EMULSION INTRAVENOUS at 15:13

## 2024-06-24 RX ADMIN — LIDOCAINE HYDROCHLORIDE 100 MG: 20 INJECTION, SOLUTION EPIDURAL; INFILTRATION; INTRACAUDAL; PERINEURAL at 14:55

## 2024-06-24 NOTE — ANESTHESIA PREPROCEDURE EVALUATION
Procedure:  COLONOSCOPY  EGD    Relevant Problems   HEMATOLOGY   (+) Iron deficiency anemia      NEURO/PSYCH   (+) Depression with anxiety      Neurology/Sleep   (+) MS (multiple sclerosis) (HCC)      Rheumatology   (+) Patellofemoral syndrome, right      MS on Tysabri.  No Steroids. No recent worsening of neurologic sx   Physical Exam    Airway    Mallampati score: II  TM Distance: >3 FB  Neck ROM: full     Dental       Cardiovascular  Cardiovascular exam normal    Pulmonary  Pulmonary exam normal     Other Findings  post-pubertal.      Anesthesia Plan  ASA Score- 3     Anesthesia Type- IV sedation with anesthesia with ASA Monitors.         Additional Monitors:     Airway Plan:            Plan Factors-Exercise tolerance (METS): >4 METS.    Chart reviewed. EKG reviewed. Imaging results reviewed. Existing labs reviewed. Patient summary reviewed.    Patient is not a current smoker.              Induction- intravenous.    Postoperative Plan-         Informed Consent- Anesthetic plan and risks discussed with patient.  I personally reviewed this patient with the CRNA. Discussed and agreed on the Anesthesia Plan with the CRNA..

## 2024-06-24 NOTE — H&P
"History and Physical -  [facility]  Charisse Bowman 39 y.o. female MRN: [id number]      HPI: Charisse Bowman is a 39 y.o. year old female who presents for an iron deficiency anemia      REVIEW OF SYSTEMS: Per the HPI, and otherwise unremarkable.    Historical Information   Past Medical History:   Diagnosis Date    Anxiety     Depression     Multiple sclerosis (HCC)      Past Surgical History:   Procedure Laterality Date     SECTION      IR LUMBAR PUNCTURE  2021     Social History   Social History     Substance and Sexual Activity   Alcohol Use Not Currently    Comment: socially with mom     Social History     Substance and Sexual Activity   Drug Use Yes    Types: Marijuana    Comment: not medical     Social History     Tobacco Use   Smoking Status Former    Current packs/day: 0.00    Average packs/day: 0.3 packs/day for 12.0 years (3.0 ttl pk-yrs)    Types: Cigarettes    Start date: 7/15/1999    Quit date: 2011    Years since quittin.9   Smokeless Tobacco Never     No family history on file.    Meds/Allergies     Not in a hospital admission.    Allergies   Allergen Reactions    Bee Pollen Other (See Comments)     Other reaction(s): Nasal Congestion    Pollen Extract Nasal Congestion       Objective     Blood pressure 123/61, pulse 82, temperature 98 °F (36.7 °C), temperature source Temporal, resp. rate 16, height 5' 1\" (1.549 m), weight 63.5 kg (140 lb), last menstrual period 2024, SpO2 98%, not currently breastfeeding.      PHYSICAL EXAM    Gen: NAD  CV: RRR  CHEST: Clear  ABD: soft, NT/ND  EXT: no edema      ASSESSMENT/PLAN:  This is a 39 y.o. year old female here for EGD with biopsy, colonoscopy, and she is stable and optimized for her procedure.          "

## 2024-06-24 NOTE — ANESTHESIA POSTPROCEDURE EVALUATION
Post-Op Assessment Note    CV Status:  Stable    Pain management: adequate       Mental Status:  Alert and awake   Hydration Status:  Euvolemic   PONV Controlled:  Controlled   Airway Patency:  Patent     Post Op Vitals Reviewed: Yes    No anethesia notable event occurred.    Staff: CRNA               BP 92/50 (06/24/24 1526)    Temp 97.6 °F (36.4 °C) (06/24/24 1526)    Pulse 79 (06/24/24 1526)   Resp 13 (06/24/24 1526)    SpO2 96 % (06/24/24 1526)

## 2024-06-25 ENCOUNTER — TELEPHONE (OUTPATIENT)
Dept: GASTROENTEROLOGY | Facility: CLINIC | Age: 39
End: 2024-06-25

## 2024-06-25 NOTE — TELEPHONE ENCOUNTER
Called and schedule a VCE per Dr Avalos with patient for anemia     7/2/24  reviewed and My Charted prep Instructions

## 2024-06-26 NOTE — TELEPHONE ENCOUNTER
Mica from [facility] called to stated that no review is needed that she has one in place it ends 02/23/2025      Mica#: [phone]

## 2024-06-28 ENCOUNTER — TELEPHONE (OUTPATIENT)
Dept: NEUROLOGY | Facility: CLINIC | Age: 39
End: 2024-06-28

## 2024-06-28 DIAGNOSIS — G35 MS (MULTIPLE SCLEROSIS) (HCC): Primary | ICD-10-CM

## 2024-06-28 PROCEDURE — 88342 IMHCHEM/IMCYTCHM 1ST ANTB: CPT | Performed by: STUDENT IN AN ORGANIZED HEALTH CARE EDUCATION/TRAINING PROGRAM

## 2024-06-28 PROCEDURE — 88313 SPECIAL STAINS GROUP 2: CPT | Performed by: STUDENT IN AN ORGANIZED HEALTH CARE EDUCATION/TRAINING PROGRAM

## 2024-06-28 PROCEDURE — 88305 TISSUE EXAM BY PATHOLOGIST: CPT | Performed by: STUDENT IN AN ORGANIZED HEALTH CARE EDUCATION/TRAINING PROGRAM

## 2024-07-02 ENCOUNTER — TELEPHONE (OUTPATIENT)
Age: 39
End: 2024-07-02

## 2024-07-02 ENCOUNTER — PATIENT MESSAGE (OUTPATIENT)
Age: 39
End: 2024-07-02

## 2024-07-02 DIAGNOSIS — G35 MS (MULTIPLE SCLEROSIS) (HCC): ICD-10-CM

## 2024-07-02 NOTE — TELEPHONE ENCOUNTER
Patients GI provider:  Eli [place]    Number to return call: [phone]    Reason for call: Pt called to reschedule her capsule endoscopy. I transferred call to [place] and Nae took the call    Scheduled procedure/appointment date if applicable: Apt/procedure N/A

## 2024-07-03 RX ORDER — GABAPENTIN 100 MG/1
CAPSULE ORAL
Qty: 180 CAPSULE | Refills: 2 | Status: SHIPPED | OUTPATIENT
Start: 2024-07-03 | End: 2024-07-05 | Stop reason: SDUPTHER

## 2024-07-03 RX ORDER — GABAPENTIN 300 MG/1
600 CAPSULE ORAL
Qty: 180 CAPSULE | Refills: 2 | Status: SHIPPED | OUTPATIENT
Start: 2024-07-03 | End: 2024-07-05

## 2024-07-04 ENCOUNTER — PATIENT MESSAGE (OUTPATIENT)
Age: 39
End: 2024-07-04

## 2024-07-04 DIAGNOSIS — G35 MS (MULTIPLE SCLEROSIS) (HCC): ICD-10-CM

## 2024-07-05 ENCOUNTER — TELEPHONE (OUTPATIENT)
Dept: NEUROLOGY | Facility: CLINIC | Age: 39
End: 2024-07-05

## 2024-07-05 RX ORDER — GABAPENTIN 600 MG/1
1200 TABLET ORAL
Qty: 180 TABLET | Refills: 0 | Status: SHIPPED | OUTPATIENT
Start: 2024-07-05

## 2024-07-05 RX ORDER — GABAPENTIN 100 MG/1
CAPSULE ORAL
Qty: 360 CAPSULE | Refills: 0 | Status: SHIPPED | OUTPATIENT
Start: 2024-07-05

## 2024-07-07 ENCOUNTER — HOSPITAL ENCOUNTER (OUTPATIENT)
Dept: MRI IMAGING | Facility: HOSPITAL | Age: 39
Discharge: HOME | End: 2024-07-07
Payer: COMMERCIAL

## 2024-07-07 DIAGNOSIS — G35 MS (MULTIPLE SCLEROSIS) (HCC): ICD-10-CM

## 2024-07-07 PROCEDURE — A9585 GADOBUTROL INJECTION: HCPCS

## 2024-07-07 PROCEDURE — 70553 MRI BRAIN STEM W/O & W/DYE: CPT

## 2024-07-07 PROCEDURE — 72156 MRI NECK SPINE W/O & W/DYE: CPT

## 2024-07-07 RX ORDER — GADOBUTROL 604.72 MG/ML
6 INJECTION INTRAVENOUS
Status: COMPLETED | OUTPATIENT
Start: 2024-07-07 | End: 2024-07-07

## 2024-07-07 RX ADMIN — GADOBUTROL 6 ML: 604.72 INJECTION INTRAVENOUS at 17:26

## 2024-07-09 ENCOUNTER — TELEPHONE (OUTPATIENT)
Age: 39
End: 2024-07-09

## 2024-07-09 NOTE — TELEPHONE ENCOUNTER
Pt was just returning a call she received between 11am-12pm. I explained to the pt there wasn't any notes regarding the miss call. I doubled checked her appts for tomorrow (7/10/24) to make sure nothing has changed with them.

## 2024-07-10 ENCOUNTER — HOSPITAL ENCOUNTER (OUTPATIENT)
Dept: MRI IMAGING | Facility: CLINIC | Age: 39
Discharge: HOME | End: 2024-07-10
Payer: COMMERCIAL

## 2024-07-10 ENCOUNTER — HOSPITAL ENCOUNTER (OUTPATIENT)
Dept: INFUSION CENTER | Facility: CLINIC | Age: 39
Discharge: HOME | End: 2024-07-10
Payer: COMMERCIAL

## 2024-07-10 DIAGNOSIS — M23.91 INTERNAL DERANGEMENT OF RIGHT KNEE: ICD-10-CM

## 2024-07-10 DIAGNOSIS — G35 MS (MULTIPLE SCLEROSIS) (HCC): Primary | ICD-10-CM

## 2024-07-10 PROCEDURE — 73721 MRI JNT OF LWR EXTRE W/O DYE: CPT

## 2024-07-10 RX ORDER — ACETAMINOPHEN 325 MG/1
650 TABLET ORAL ONCE
OUTPATIENT
Start: 2024-08-07 | End: 2024-08-07

## 2024-07-10 RX ORDER — ACETAMINOPHEN 325 MG/1
650 TABLET ORAL ONCE
Status: COMPLETED | OUTPATIENT
Start: 2024-07-10 | End: 2024-07-10

## 2024-07-10 RX ORDER — SODIUM CHLORIDE 9 MG/ML
20 INJECTION, SOLUTION INTRAVENOUS ONCE
Status: COMPLETED | OUTPATIENT
Start: 2024-07-10 | End: 2024-07-10

## 2024-07-10 RX ORDER — SODIUM CHLORIDE 9 MG/ML
20 INJECTION, SOLUTION INTRAVENOUS ONCE
OUTPATIENT
Start: 2024-08-07

## 2024-07-10 RX ADMIN — ACETAMINOPHEN 650 MG: 325 TABLET, FILM COATED ORAL at 15:07

## 2024-07-10 RX ADMIN — SODIUM CHLORIDE 20 ML/HR: 0.9 INJECTION, SOLUTION INTRAVENOUS at 15:07

## 2024-07-10 RX ADMIN — DIPHENHYDRAMINE HYDROCHLORIDE 50 MG: 50 INJECTION, SOLUTION INTRAMUSCULAR; INTRAVENOUS at 15:07

## 2024-07-10 RX ADMIN — NATALIZUMAB 300 MG: 300 INJECTION INTRAVENOUS at 15:42

## 2024-07-10 NOTE — PROGRESS NOTES
Patient arrives to infusion center for Tysabri infusion. Patient offers no complaints today. PIV established. Patient tolerated entire infusion without complication. Wait period surpassed without incident. PIV removed. AVS offered.     Next appointment:  8/7/24 @ 1500

## 2024-07-12 ENCOUNTER — TELEPHONE (OUTPATIENT)
Dept: NEUROLOGY | Facility: CLINIC | Age: 39
End: 2024-07-12

## 2024-07-13 ENCOUNTER — HOSPITAL ENCOUNTER (OUTPATIENT)
Dept: MRI IMAGING | Facility: HOSPITAL | Age: 39
Discharge: HOME | End: 2024-07-13
Payer: COMMERCIAL

## 2024-07-13 DIAGNOSIS — G35 MS (MULTIPLE SCLEROSIS) (HCC): ICD-10-CM

## 2024-07-13 PROCEDURE — 72157 MRI CHEST SPINE W/O & W/DYE: CPT

## 2024-07-13 PROCEDURE — A9585 GADOBUTROL INJECTION: HCPCS

## 2024-07-13 RX ORDER — GADOBUTROL 604.72 MG/ML
6 INJECTION INTRAVENOUS
Status: COMPLETED | OUTPATIENT
Start: 2024-07-13 | End: 2024-07-13

## 2024-07-13 RX ADMIN — GADOBUTROL 6 ML: 604.72 INJECTION INTRAVENOUS at 15:11

## 2024-07-18 ENCOUNTER — OFFICE VISIT (OUTPATIENT)
Dept: OBGYN CLINIC | Facility: CLINIC | Age: 39
End: 2024-07-18
Payer: COMMERCIAL

## 2024-07-18 VITALS
DIASTOLIC BLOOD PRESSURE: 66 MMHG | HEART RATE: 79 BPM | WEIGHT: 140 LBS | SYSTOLIC BLOOD PRESSURE: 102 MMHG | HEIGHT: 61 IN | BODY MASS INDEX: 26.43 KG/M2

## 2024-07-18 DIAGNOSIS — M22.2X1 PATELLOFEMORAL SYNDROME, RIGHT: Primary | ICD-10-CM

## 2024-07-18 PROCEDURE — 99213 OFFICE O/P EST LOW 20 MIN: CPT | Performed by: ORTHOPAEDIC SURGERY

## 2024-07-18 NOTE — PROGRESS NOTES
Orthopaedics Office Visit - Follow up Patient Visit    ASSESSMENT/PLAN:    Assessment:   Right Knee Patellofemoral maltracking  Quadriceps and gluteus weakness on right    Multiple sclerosis  Iron deficient     Plan:   MRI study reviewed in the office today  Discussed repeat CSI in 2 months, viscosupplementation, vs referral to ortho sports med   The patient was referred to Dr. Calderon for discussion about operative management - lateral release? TTO?  Continue physical therapy and home exercises  She will follow up with me as needed    To Do Next Visit:  F/u with Dr. Calderon    _____________________________________________________  CHIEF COMPLAINT:  Chief Complaint   Patient presents with    Right Knee - Follow-up     Review MRI. Patient had physical therapy today. When she got there, her pain was a 5 and when she left it was a 7.         SUBJECTIVE:  Charisse Bowamn is a 39 y.o. female who presents for follow up evaluation of right knee pain secondary to patellofemoral disorder. Pain is rated 5/10. She continues physical therapy for her MS. She admits relief from the last CSI for approximately 2 weeks. Pain radiates from her knee to her hip. She wears a compression sleeve for comfort.     PAST MEDICAL HISTORY:  Past Medical History:   Diagnosis Date    Anxiety     Depression 2020    Multiple sclerosis (HCC)        PAST SURGICAL HISTORY:  Past Surgical History:   Procedure Laterality Date     SECTION      IR LUMBAR PUNCTURE  2021       FAMILY HISTORY:  History reviewed. No pertinent family history.    SOCIAL HISTORY:  Social History     Tobacco Use    Smoking status: Former     Current packs/day: 0.00     Average packs/day: 0.3 packs/day for 16.0 years (5.0 ttl pk-yrs)     Types: Cigarettes     Start date: 7/15/1999     Quit date: 2011     Years since quittin.9    Smokeless tobacco: Never   Vaping Use    Vaping status: Never Used   Substance Use Topics    Alcohol use: Not Currently      "Comment: socially with mom    Drug use: Yes     Types: Marijuana     Comment: not medical       MEDICATIONS:    Current Outpatient Medications:     buPROPion (WELLBUTRIN XL) 150 mg 24 hr tablet, Take 1 tablet by mouth every morning, Disp: , Rfl:     fluticasone (FLONASE) 50 mcg/act nasal spray, 2 sprays into each nostril daily, Disp: , Rfl:     gabapentin (NEURONTIN) 100 mg capsule, take 200 mg bid (in addition to separate rx for 1200 mg qhs), Disp: 360 capsule, Rfl: 0    gabapentin (NEURONTIN) 600 MG tablet, Take 2 tablets (1,200 mg total) by mouth daily at bedtime, Disp: 180 tablet, Rfl: 0    lidocaine (LIDODERM) 5 %, Apply 1 patch topically daily as needed (R. Knee pain) Remove & Discard patch within 12 hours or as directed by MD, Disp: 15 patch, Rfl: 0    meloxicam (Mobic) 15 mg tablet, Take 1 tablet (15 mg total) by mouth daily With food, Disp: 45 tablet, Rfl: 2    meloxicam (MOBIC) 7.5 mg tablet, Take 7.5 mg by mouth daily, Disp: , Rfl:     methocarbamol (ROBAXIN) 750 mg tablet, Take 1 tablet (750 mg total) by mouth every 6 (six) hours as needed for muscle spasms, Disp: 120 tablet, Rfl: 3    ALLERGIES:  Allergies   Allergen Reactions    Bee Pollen Other (See Comments)     Other reaction(s): Nasal Congestion    Pollen Extract Nasal Congestion       REVIEW OF SYSTEMS:  MSK: right knee  Neuro: WNL  Pertinent items are otherwise noted in HPI.  A comprehensive review of systems was otherwise negative.    LABS:  HgA1c: No results found for: \"HGBA1C\"  BMP:   Lab Results   Component Value Date    CALCIUM 9.3 03/14/2024    K 3.6 03/14/2024    CO2 25 03/14/2024     03/14/2024    BUN 13 03/14/2024    CREATININE 0.70 03/14/2024     CBC: No components found for: \"CBC\"    _____________________________________________________  PHYSICAL EXAMINATION:  Vital signs: /66   Pulse 79   Ht 5' 1\" (1.549 m)   Wt 63.5 kg (140 lb)   LMP 06/14/2024 (Exact Date)   BMI 26.45 kg/m²   General: No acute distress, awake and " alert  Psychiatric: Mood and affect appear appropriate  HEENT: Trachea Midline, No torticollis, no apparent facial trauma  Cardiovascular: No audible murmurs; Extremities appear perfused  Pulmonary: No audible wheezing or stridor  Skin: No open lesions; see further details (if any) below    MUSCULOSKELETAL EXAMINATION:  Extremities:    Right Knee  Alignment neutral  There is no effusion.  There is tenderness over the quad tendon  Range of motion from 0 to 125.    There is minimal crepitus with range of motion.   There is 5-/5 quadriceps strength and decreased tone in comparison to contralateral.    The patient is able to perform a straight leg raise.    Stable to valgus and varus stress.   No ligament laxity   Edwina's testing equivocal   Patellar grind testing is positive  The patient is neurovascular intact distally.      _____________________________________________________  STUDIES REVIEWED:  I personally reviewed the images and interpretation is as follows:    MRI of the right knee obtained 7/10/2024 demonstrates edema at superolateral aspect of hoffa's fat pad.     PROCEDURES PERFORMED:  Procedures  None.      Scribe Attestation      I,:  Florencia Garg am acting as a scribe while in the presence of the attending physician.:       I,:  Terrence Silva MD personally performed the services described in this documentation    as scribed in my presence.:

## 2024-07-22 DIAGNOSIS — M47.812 CERVICAL SPONDYLOSIS: Primary | ICD-10-CM

## 2024-07-23 ENCOUNTER — APPOINTMENT (OUTPATIENT)
Dept: LAB | Facility: HOSPITAL | Age: 39
End: 2024-07-23
Payer: COMMERCIAL

## 2024-07-23 DIAGNOSIS — D50.0 IRON DEFICIENCY ANEMIA DUE TO CHRONIC BLOOD LOSS: ICD-10-CM

## 2024-07-23 DIAGNOSIS — G35 MS (MULTIPLE SCLEROSIS) (HCC): ICD-10-CM

## 2024-07-23 LAB
ALBUMIN SERPL BCG-MCNC: 4.1 G/DL (ref 3.5–5)
ALP SERPL-CCNC: 41 U/L (ref 34–104)
ALT SERPL W P-5'-P-CCNC: 10 U/L (ref 7–52)
ANION GAP SERPL CALCULATED.3IONS-SCNC: 6 MMOL/L (ref 4–13)
AST SERPL W P-5'-P-CCNC: 10 U/L (ref 13–39)
BACTERIA UR QL AUTO: ABNORMAL /HPF
BASOPHILS # BLD AUTO: 0.04 THOUSANDS/ÂΜL (ref 0–0.1)
BASOPHILS NFR BLD AUTO: 1 % (ref 0–1)
BILIRUB SERPL-MCNC: 0.34 MG/DL (ref 0.2–1)
BILIRUB UR QL STRIP: NEGATIVE
BUN SERPL-MCNC: 13 MG/DL (ref 5–25)
CALCIUM SERPL-MCNC: 8.9 MG/DL (ref 8.4–10.2)
CHLORIDE SERPL-SCNC: 106 MMOL/L (ref 96–108)
CLARITY UR: CLEAR
CO2 SERPL-SCNC: 26 MMOL/L (ref 21–32)
COLOR UR: YELLOW
CREAT SERPL-MCNC: 0.84 MG/DL (ref 0.6–1.3)
EOSINOPHIL # BLD AUTO: 0.23 THOUSAND/ÂΜL (ref 0–0.61)
EOSINOPHIL NFR BLD AUTO: 3 % (ref 0–6)
ERYTHROCYTE [DISTWIDTH] IN BLOOD BY AUTOMATED COUNT: 16.2 % (ref 11.6–15.1)
FERRITIN SERPL-MCNC: 85 NG/ML (ref 11–307)
GFR SERPL CREATININE-BSD FRML MDRD: 87 ML/MIN/1.73SQ M
GLUCOSE P FAST SERPL-MCNC: 95 MG/DL (ref 65–99)
GLUCOSE UR STRIP-MCNC: NEGATIVE MG/DL
HCT VFR BLD AUTO: 37.6 % (ref 34.8–46.1)
HGB BLD-MCNC: 11.5 G/DL (ref 11.5–15.4)
HGB UR QL STRIP.AUTO: ABNORMAL
IMM GRANULOCYTES # BLD AUTO: 0.02 THOUSAND/UL (ref 0–0.2)
IMM GRANULOCYTES NFR BLD AUTO: 0 % (ref 0–2)
IRON SATN MFR SERPL: 26 % (ref 15–50)
IRON SERPL-MCNC: 65 UG/DL (ref 50–212)
KETONES UR STRIP-MCNC: NEGATIVE MG/DL
LEUKOCYTE ESTERASE UR QL STRIP: ABNORMAL
LYMPHOCYTES # BLD AUTO: 2.68 THOUSANDS/ÂΜL (ref 0.6–4.47)
LYMPHOCYTES NFR BLD AUTO: 38 % (ref 14–44)
MCH RBC QN AUTO: 22.3 PG (ref 26.8–34.3)
MCHC RBC AUTO-ENTMCNC: 30.6 G/DL (ref 31.4–37.4)
MCV RBC AUTO: 73 FL (ref 82–98)
MONOCYTES # BLD AUTO: 0.45 THOUSAND/ÂΜL (ref 0.17–1.22)
MONOCYTES NFR BLD AUTO: 6 % (ref 4–12)
MUCOUS THREADS UR QL AUTO: ABNORMAL
NEUTROPHILS # BLD AUTO: 3.59 THOUSANDS/ÂΜL (ref 1.85–7.62)
NEUTS SEG NFR BLD AUTO: 52 % (ref 43–75)
NITRITE UR QL STRIP: NEGATIVE
NON-SQ EPI CELLS URNS QL MICRO: ABNORMAL /HPF
NRBC BLD AUTO-RTO: 0 /100 WBCS
PH UR STRIP.AUTO: 6 [PH]
PLATELET # BLD AUTO: 245 THOUSANDS/UL (ref 149–390)
PMV BLD AUTO: 9.4 FL (ref 8.9–12.7)
POTASSIUM SERPL-SCNC: 4 MMOL/L (ref 3.5–5.3)
PROT SERPL-MCNC: 7 G/DL (ref 6.4–8.4)
PROT UR STRIP-MCNC: ABNORMAL MG/DL
RBC # BLD AUTO: 5.15 MILLION/UL (ref 3.81–5.12)
RBC #/AREA URNS AUTO: ABNORMAL /HPF
SODIUM SERPL-SCNC: 138 MMOL/L (ref 135–147)
SP GR UR STRIP.AUTO: 1.03 (ref 1–1.03)
TIBC SERPL-MCNC: 252 UG/DL (ref 250–450)
UIBC SERPL-MCNC: 187 UG/DL (ref 155–355)
UROBILINOGEN UR STRIP-ACNC: 2 MG/DL
WBC # BLD AUTO: 7.01 THOUSAND/UL (ref 4.31–10.16)
WBC #/AREA URNS AUTO: ABNORMAL /HPF

## 2024-07-23 PROCEDURE — 83550 IRON BINDING TEST: CPT

## 2024-07-23 PROCEDURE — 36415 COLL VENOUS BLD VENIPUNCTURE: CPT

## 2024-07-23 PROCEDURE — 80053 COMPREHEN METABOLIC PANEL: CPT

## 2024-07-23 PROCEDURE — 83540 ASSAY OF IRON: CPT

## 2024-07-23 PROCEDURE — 81001 URINALYSIS AUTO W/SCOPE: CPT

## 2024-07-23 PROCEDURE — 82728 ASSAY OF FERRITIN: CPT

## 2024-07-23 PROCEDURE — 86711 JOHN CUNNINGHAM ANTIBODY: CPT

## 2024-07-23 PROCEDURE — 85025 COMPLETE CBC W/AUTO DIFF WBC: CPT

## 2024-07-24 ENCOUNTER — TELEPHONE (OUTPATIENT)
Dept: HEMATOLOGY ONCOLOGY | Facility: CLINIC | Age: 39
End: 2024-07-24

## 2024-07-26 ENCOUNTER — TELEPHONE (OUTPATIENT)
Dept: NEUROLOGY | Facility: CLINIC | Age: 39
End: 2024-07-26

## 2024-07-26 DIAGNOSIS — R71.8 MICROCYTOSIS: Primary | ICD-10-CM

## 2024-07-26 NOTE — TELEPHONE ENCOUNTER
Pt is scheduled for Tysabri on 8/7/24.   Last infusion 7/10/24, infusions every 4 weeks. Confirmed scheduling is correct.      Last CBC/CMP 7/23/24  Last JCV 2/15/24 0.25 (final result negative)  Last MRI brain 7/7/24     Updated JCV results in process.      Okay to proceed with infusion?     Tysabri is approved with P from 2/23/24 to 2/23/25 at [facility]. Auth # [id number]. See 6/21/24 encounter. No review needed for site NPI/Tax ID change.     Tysabri TOUCH auth valid until 1/29/25 at [facility].

## 2024-07-29 ENCOUNTER — OFFICE VISIT (OUTPATIENT)
Dept: HEMATOLOGY ONCOLOGY | Facility: CLINIC | Age: 39
End: 2024-07-29
Payer: COMMERCIAL

## 2024-07-29 VITALS
BODY MASS INDEX: 26.81 KG/M2 | TEMPERATURE: 97.7 F | SYSTOLIC BLOOD PRESSURE: 106 MMHG | WEIGHT: 142 LBS | HEIGHT: 61 IN | HEART RATE: 67 BPM | OXYGEN SATURATION: 98 % | RESPIRATION RATE: 18 BRPM | DIASTOLIC BLOOD PRESSURE: 74 MMHG

## 2024-07-29 DIAGNOSIS — R31.29 MICROSCOPIC HEMATURIA: ICD-10-CM

## 2024-07-29 DIAGNOSIS — D50.0 IRON DEFICIENCY ANEMIA DUE TO CHRONIC BLOOD LOSS: Primary | ICD-10-CM

## 2024-07-29 PROCEDURE — 99213 OFFICE O/P EST LOW 20 MIN: CPT | Performed by: PHYSICIAN ASSISTANT

## 2024-07-29 NOTE — PROGRESS NOTES
[facility] [place]  [address]  [place] [address]  Hematology Ambulatory Follow-Up  Charisse Bowman, 1985, [id number]  7/29/2024      Assessment and Plan   1. Iron deficiency anemia due to chronic blood loss  - CBC and differential; Future  - Iron Panel (Includes Ferritin, Iron Sat%, Iron, and TIBC); Future    2. Microscopic hematuria  - Ambulatory Referral to Urology; Future      39-year-old female with a history of iron deficiency anemia since at least 7/2021. She admits to menorrhagia that is slight with regular cycles. Had IUD placed without improvement in symptoms a few years ago. Her recent workup showed normal hemoglobin electrophoresis, positive Hemoccult study and ?  microscopic hematuria.  Last received IV Venofer 300 mg weekly x 2 in June 2024.  She was referred to gastroenterology and had EGD/colonoscopy which showed 3 polyps (tubular adenoma) otherwise was unremarkable.  Planning for VCE tomorrow.  UA was repeated and it shows persistent microscopic hematuria.  Most recent CBC ->hgb 11.5g/dL, MCV 73, Iron 65, iron saturation 26%, ferritin 85    Discussion/Plan   Iron panel improved however she has persistent microcytosis. Recommend alpha thalassemia dna testing   Proceed with VCE as planned  Refer to urology for microscopic hematuria   RTC 3m with repeat CBC, iron panel prior to f/u     Patient voiced agreement and understanding to the above.   Patient advised to call the Hematology/Oncology office with any questions and concerns regarding the above.    Barrier(s) to care: None  The patient is able to self care.    Blanca Tabares PA-C   Medical Oncology/Hematology  [facility]    Subjective   No chief complaint on file.      History of present illness: Charisse Bowman is a 39 y.o. female who came in for follow up      1. Iron Deficiency Anemia without clear cause  - Possible etiologies: menorrhagia  with regular cycle + low iron intake. However, history does not appear strong enough to explain degree of anemia.  - not a vegan or vegetarian. No malabsorptive conditions known. Menstrual cycle is 5 days with 1st 2 days heaviest. She uses a tampon + liner and changes Q2 hours on heavier days. No bleeding into urine or stools. No blood transfusion hx, IV iron hx. Not on PPI. No hiatal hernia hx.     - anemia present since at least 7/2021.   - 8/2023: WBC 6.99, Hgb 9.9, MCV 67, MCH 20, MCHC 29.7, PLT 258K, diff normal. Ferritin 5, iron 23, TIBC 391, iron sat 6%. CMP unrevealing.      09/2023: s/p IV Venofer 300mg weekly x4      05/2024: WBC 8.64, hemoglobin 1.9, MCV 72, platelets 236,000. Iron 110, iron saturation 38%, ferritin 22  - Positive fecal occult study  - UA positive occult blood however microscopic analysis showed normal UA RBC  - Hgb electrophoresis normal.   - Celiac disease panel normal  - CMP unrevealing    06/2024: s/p IV Venofer 300mg weekly x 2   06/2024: EGD: normal. Colonoscopy: 3 polyps removed. Biopsy neg for celiac, h pylori, metaplasia. Polyps all tubular adenoma.     07/2024: hgb 11.5g/dL, MCV 73, Iron 65, iron saturation 26%, ferritin 85. UA again positive for occult blood.    2. History of MS  On infusions Tysabri. Follows neurology. <1% risk of hematology d/o with med.    07/29/24 :  Lab Results   Component Value Date    IRON 65 07/23/2024    TIBC 252 07/23/2024    FERRITIN 85 07/23/2024     Lab Results   Component Value Date    WBC 7.01 07/23/2024    HGB 11.5 07/23/2024    HCT 37.6 07/23/2024    MCV 73 (L) 07/23/2024     07/23/2024       Interval history: She had EGD/colonoscopy that showed polyps. planning for capsule endoscopy tomorrow. Recent UA showed micropscopic hematuria. She denies gross hematuria. UA was not completed near time of menses.     Review of Systems   Constitutional:  Negative for fatigue, fever and unexpected weight change.   Respiratory:  Negative for shortness  of breath.    Cardiovascular:  Negative for chest pain.   Hematological:  Negative for adenopathy. Does not bruise/bleed easily.   All other systems reviewed and are negative.      Patient Active Problem List   Diagnosis    Patellofemoral syndrome, right    MS (multiple sclerosis) (HCC)    Weakness    Depression with anxiety    Iron deficiency anemia    Muscle cramping    Internal derangement of right knee     Past Medical History:   Diagnosis Date    Anxiety     Depression 2020    Multiple sclerosis (HCC)      Past Surgical History:   Procedure Laterality Date     SECTION      IR LUMBAR PUNCTURE  2021     No family history on file.  Social History     Socioeconomic History    Marital status: Single     Spouse name: Not on file    Number of children: Not on file    Years of education: Not on file    Highest education level: Not on file   Occupational History    Not on file   Tobacco Use    Smoking status: Former     Current packs/day: 0.00     Average packs/day: 0.3 packs/day for 16.0 years (5.0 ttl pk-yrs)     Types: Cigarettes     Start date: 7/15/1999     Quit date: 2011     Years since quittin.0    Smokeless tobacco: Never   Vaping Use    Vaping status: Never Used   Substance and Sexual Activity    Alcohol use: Not Currently     Comment: socially with mom    Drug use: Yes     Types: Marijuana     Comment: not medical    Sexual activity: Yes     Partners: Male     Birth control/protection: I.U.D.   Other Topics Concern    Not on file   Social History Narrative    Not on file     Social Determinants of Health     Financial Resource Strain: Not on file   Food Insecurity: Not on file   Transportation Needs: Not on file   Physical Activity: Not on file   Stress: Not on file   Social Connections: Unknown (2024)    Received from [employer]    Social Timeline Labs / TLL     How often do you feel lonely or isolated from those around you? (Adult - for ages 18 years and over): Not on file   Intimate  Partner Violence: Not on file   Housing Stability: Not on file       Current Outpatient Medications:     buPROPion (WELLBUTRIN XL) 150 mg 24 hr tablet, Take 1 tablet by mouth every morning, Disp: , Rfl:     fluticasone (FLONASE) 50 mcg/act nasal spray, 2 sprays into each nostril daily, Disp: , Rfl:     gabapentin (NEURONTIN) 100 mg capsule, take 200 mg bid (in addition to separate rx for 1200 mg qhs), Disp: 360 capsule, Rfl: 0    gabapentin (NEURONTIN) 600 MG tablet, Take 2 tablets (1,200 mg total) by mouth daily at bedtime, Disp: 180 tablet, Rfl: 0    lidocaine (LIDODERM) 5 %, Apply 1 patch topically daily as needed (R. Knee pain) Remove & Discard patch within 12 hours or as directed by MD, Disp: 15 patch, Rfl: 0    meloxicam (Mobic) 15 mg tablet, Take 1 tablet (15 mg total) by mouth daily With food, Disp: 45 tablet, Rfl: 2    meloxicam (MOBIC) 7.5 mg tablet, Take 7.5 mg by mouth daily, Disp: , Rfl:     methocarbamol (ROBAXIN) 750 mg tablet, Take 1 tablet (750 mg total) by mouth every 6 (six) hours as needed for muscle spasms, Disp: 120 tablet, Rfl: 3  Allergies   Allergen Reactions    Bee Pollen Other (See Comments)     Other reaction(s): Nasal Congestion    Pollen Extract Nasal Congestion       Objective   There were no vitals taken for this visit.   Physical Exam  Vitals reviewed.   HENT:      Head: Normocephalic.   Cardiovascular:      Rate and Rhythm: Normal rate and regular rhythm.   Pulmonary:      Effort: Pulmonary effort is normal.      Breath sounds: Normal breath sounds.   Abdominal:      Palpations: Abdomen is soft.      Tenderness: There is no abdominal tenderness.   Musculoskeletal:      Cervical back: Neck supple.   Skin:     Findings: No rash.   Neurological:      Mental Status: She is alert.         Result Review  Labs:  Appointment on 07/23/2024   Component Date Value Ref Range Status    Color, UA 07/23/2024 Yellow   Final    Clarity, UA 07/23/2024 Clear   Final    Specific Gravity, UA 07/23/2024  1.031 (H)  1.003 - 1.030 Final    pH, UA 07/23/2024 6.0  4.5, 5.0, 5.5, 6.0, 6.5, 7.0, 7.5, 8.0 Final    Leukocytes, UA 07/23/2024 Trace (A)  Negative Final    Nitrite, UA 07/23/2024 Negative  Negative Final    Protein, UA 07/23/2024 Trace (A)  Negative mg/dl Final    Glucose, UA 07/23/2024 Negative  Negative mg/dl Final    Ketones, UA 07/23/2024 Negative  Negative mg/dl Final    Urobilinogen, UA 07/23/2024 2.0 (A)  <2.0 mg/dl mg/dl Final    Bilirubin, UA 07/23/2024 Negative  Negative Final    Occult Blood, UA 07/23/2024 Small (A)  Negative Final    WBC 07/23/2024 7.01  4.31 - 10.16 Thousand/uL Final    RBC 07/23/2024 5.15 (H)  3.81 - 5.12 Million/uL Final    Hemoglobin 07/23/2024 11.5  11.5 - 15.4 g/dL Final    Hematocrit 07/23/2024 37.6  34.8 - 46.1 % Final    MCV 07/23/2024 73 (L)  82 - 98 fL Final    MCH 07/23/2024 22.3 (L)  26.8 - 34.3 pg Final    MCHC 07/23/2024 30.6 (L)  31.4 - 37.4 g/dL Final    RDW 07/23/2024 16.2 (H)  11.6 - 15.1 % Final    MPV 07/23/2024 9.4  8.9 - 12.7 fL Final    Platelets 07/23/2024 245  149 - 390 Thousands/uL Final    nRBC 07/23/2024 0  /100 WBCs Final    Segmented % 07/23/2024 52  43 - 75 % Final    Immature Grans % 07/23/2024 0  0 - 2 % Final    Lymphocytes % 07/23/2024 38  14 - 44 % Final    Monocytes % 07/23/2024 6  4 - 12 % Final    Eosinophils Relative 07/23/2024 3  0 - 6 % Final    Basophils Relative 07/23/2024 1  0 - 1 % Final    Absolute Neutrophils 07/23/2024 3.59  1.85 - 7.62 Thousands/µL Final    Absolute Immature Grans 07/23/2024 0.02  0.00 - 0.20 Thousand/uL Final    Absolute Lymphocytes 07/23/2024 2.68  0.60 - 4.47 Thousands/µL Final    Absolute Monocytes 07/23/2024 0.45  0.17 - 1.22 Thousand/µL Final    Eosinophils Absolute 07/23/2024 0.23  0.00 - 0.61 Thousand/µL Final    Basophils Absolute 07/23/2024 0.04  0.00 - 0.10 Thousands/µL Final    Sodium 07/23/2024 138  135 - 147 mmol/L Final    Potassium 07/23/2024 4.0  3.5 - 5.3 mmol/L Final    Chloride 07/23/2024 106  96  - 108 mmol/L Final    CO2 07/23/2024 26  21 - 32 mmol/L Final    ANION GAP 07/23/2024 6  4 - 13 mmol/L Final    BUN 07/23/2024 13  5 - 25 mg/dL Final    Creatinine 07/23/2024 0.84  0.60 - 1.30 mg/dL Final    Standardized to IDMS reference method    Glucose, Fasting 07/23/2024 95  65 - 99 mg/dL Final    Calcium 07/23/2024 8.9  8.4 - 10.2 mg/dL Final    AST 07/23/2024 10 (L)  13 - 39 U/L Final    ALT 07/23/2024 10  7 - 52 U/L Final    Specimen collection should occur prior to Sulfasalazine administration due to the potential for falsely depressed results.     Alkaline Phosphatase 07/23/2024 41  34 - 104 U/L Final    Total Protein 07/23/2024 7.0  6.4 - 8.4 g/dL Final    Albumin 07/23/2024 4.1  3.5 - 5.0 g/dL Final    Total Bilirubin 07/23/2024 0.34  0.20 - 1.00 mg/dL Final    Use of this assay is not recommended for patients undergoing treatment with eltrombopag due to the potential for falsely elevated results.  N-acetyl-p-benzoquinone imine (metabolite of Acetaminophen) will generate erroneously low results in samples for patients that have taken an overdose of Acetaminophen.    eGFR 07/23/2024 87  ml/min/1.73sq m Final    Iron Saturation 07/23/2024 26  15 - 50 % Final    TIBC 07/23/2024 252  250 - 450 ug/dL Final    Iron 07/23/2024 65  50 - 212 ug/dL Final    Patients treated with metal-binding drugs (ie. Deferoxamine) may have depressed iron values.    UIBC 07/23/2024 187  155 - 355 ug/dL Final    Ferritin 07/23/2024 85  11 - 307 ng/mL Final    RBC, UA 07/23/2024 10-20 (A)  None Seen, 1-2 /hpf Final    WBC, UA 07/23/2024 4-10 (A)  None Seen, 1-2 /hpf Final    Epithelial Cells 07/23/2024 Occasional  None Seen, Occasional /hpf Final    Bacteria, UA 07/23/2024 Occasional  None Seen, Occasional /hpf Final    MUCUS THREADS 07/23/2024 Occasional (A)  None Seen Final       Imaging:   I reviewed relevant imaging    Please note:  This report has been generated by a voice recognition software system. Therefore there may be  syntax, spelling, and/or grammatical errors. Please call if you have any questions.   DISPLAY PLAN FREE TEXT DISPLAY PLAN FREE TEXT DISPLAY PLAN FREE TEXT

## 2024-07-30 ENCOUNTER — PROCEDURE VISIT (OUTPATIENT)
Dept: GASTROENTEROLOGY | Facility: CLINIC | Age: 39
End: 2024-07-30
Payer: COMMERCIAL

## 2024-07-30 DIAGNOSIS — D50.0 IRON DEFICIENCY ANEMIA SECONDARY TO BLOOD LOSS (CHRONIC): Primary | ICD-10-CM

## 2024-07-30 LAB
GALACTOMANNAN AG SERPL IA-ACNC: 0.17
JCPYV AB SERPL QL IA: NEGATIVE
JCPYV AB SERPL QL IA: NORMAL
SPECIMEN STATUS: NORMAL

## 2024-07-30 NOTE — LETTER
July 30, 2024       No Recipients    Patient: Charisse Bowman   YOB: 1985   Date of Visit: 7/30/2024         To Whom It May Concern:      Please excuse the above name patient is currently under my care. Please excuse her today from phsyical therapy. If you have any questions , please call the office at [phone]. Thank You .        Sincerely,        Donte Avalos MD

## 2024-07-31 PROCEDURE — 91110 GI TRC IMG INTRAL ESOPH-ILE: CPT | Performed by: INTERNAL MEDICINE

## 2024-08-16 DIAGNOSIS — G35 MS (MULTIPLE SCLEROSIS) (HCC): Primary | ICD-10-CM

## 2024-08-16 RX ORDER — SODIUM CHLORIDE 9 MG/ML
20 INJECTION, SOLUTION INTRAVENOUS ONCE
Status: CANCELLED | OUTPATIENT
Start: 2024-08-20

## 2024-08-16 RX ORDER — ACETAMINOPHEN 325 MG/1
650 TABLET ORAL ONCE
Status: CANCELLED | OUTPATIENT
Start: 2024-08-20 | End: 2024-08-20

## 2024-08-20 ENCOUNTER — HOSPITAL ENCOUNTER (OUTPATIENT)
Dept: INFUSION CENTER | Facility: CLINIC | Age: 39
Discharge: HOME | End: 2024-08-20
Payer: COMMERCIAL

## 2024-08-20 VITALS
TEMPERATURE: 97.8 F | RESPIRATION RATE: 16 BRPM | DIASTOLIC BLOOD PRESSURE: 63 MMHG | HEART RATE: 65 BPM | SYSTOLIC BLOOD PRESSURE: 103 MMHG

## 2024-08-20 DIAGNOSIS — G35 MS (MULTIPLE SCLEROSIS) (HCC): Primary | ICD-10-CM

## 2024-08-20 PROCEDURE — 96367 TX/PROPH/DG ADDL SEQ IV INF: CPT

## 2024-08-20 PROCEDURE — 96365 THER/PROPH/DIAG IV INF INIT: CPT

## 2024-08-20 RX ORDER — SODIUM CHLORIDE 9 MG/ML
20 INJECTION, SOLUTION INTRAVENOUS ONCE
Status: COMPLETED | OUTPATIENT
Start: 2024-08-20 | End: 2024-08-20

## 2024-08-20 RX ORDER — ACETAMINOPHEN 325 MG/1
650 TABLET ORAL ONCE
OUTPATIENT
Start: 2024-09-17 | End: 2024-09-17

## 2024-08-20 RX ORDER — SODIUM CHLORIDE 9 MG/ML
20 INJECTION, SOLUTION INTRAVENOUS ONCE
OUTPATIENT
Start: 2024-09-17

## 2024-08-20 RX ORDER — ACETAMINOPHEN 325 MG/1
650 TABLET ORAL ONCE
Status: COMPLETED | OUTPATIENT
Start: 2024-08-20 | End: 2024-08-20

## 2024-08-20 RX ADMIN — SODIUM CHLORIDE 20 ML/HR: 0.9 INJECTION, SOLUTION INTRAVENOUS at 14:54

## 2024-08-20 RX ADMIN — DIPHENHYDRAMINE HYDROCHLORIDE 50 MG: 50 INJECTION, SOLUTION INTRAMUSCULAR; INTRAVENOUS at 14:57

## 2024-08-20 RX ADMIN — NATALIZUMAB 300 MG: 300 INJECTION INTRAVENOUS at 15:40

## 2024-08-20 RX ADMIN — ACETAMINOPHEN 650 MG: 325 TABLET, FILM COATED ORAL at 14:54

## 2024-08-20 NOTE — PROGRESS NOTES
Patient tolerated remainder of infusion without incident.  Observed after infusion for only 20 minutes as patient needed to leave.  PIV removed.  AVS declined. Walked out in stable condition.

## 2024-08-20 NOTE — PROGRESS NOTES
Pt here for Tysabri, offering no complaints. Pre-infusion checklist questions answered and submitted online. PIV established with positive blood return. Tolerating infusion thus far, report given to Latanya JACKSON RN. Next appt 9/17 3pm.

## 2024-08-21 ENCOUNTER — CONSULT (OUTPATIENT)
Dept: NEUROSURGERY | Facility: CLINIC | Age: 39
End: 2024-08-21
Payer: COMMERCIAL

## 2024-08-21 VITALS
HEART RATE: 68 BPM | DIASTOLIC BLOOD PRESSURE: 68 MMHG | TEMPERATURE: 98.1 F | WEIGHT: 142 LBS | SYSTOLIC BLOOD PRESSURE: 110 MMHG | BODY MASS INDEX: 26.81 KG/M2 | OXYGEN SATURATION: 98 % | HEIGHT: 61 IN

## 2024-08-21 DIAGNOSIS — M47.812 CERVICAL SPONDYLOSIS: ICD-10-CM

## 2024-08-21 PROCEDURE — 99203 OFFICE O/P NEW LOW 30 MIN: CPT | Performed by: SPECIALIST

## 2024-08-21 NOTE — PROGRESS NOTES
History and Physical - Neurosurgery   Charisse Bowman 39 y.o. female MRN: [id number]      Assessment:      I examined the patient, reviewed the diagnostic studies, and had a very pleasant and detailed discussion with Charisse regarding our findings.  We reviewed with the patient in detail the MR study on the computer monitor, and Charisse was easily able to appreciate with us the right C4-5 herniated disc and C6-7 stenosis. Fortunately, on exam the patient's strength was equal and symmetric, and there were no areas of decreased sensation to touch. She made her wishes explicitly clear today and explained from the outset that she had no interest in proceeding with surgery for her spine.  Her case is complicated by the fact that she also has multiple sclerosis and is symptomatic from that.  She has been participating with physical therapy, but has not partaking in any formal pain management as of yet.  I explained to her that I am happy to make the referral to them, but could not comment on precisely what they would offer.  Some patients are initially treated with medications and various other cocktails, and others perhaps are more of a candidate for epidural steroid injections.  I explained to her that I will leave it up to that team to make the final determination of the next steps.  Her symptomatology does seem to radiate from the neck down to the shoulder region and it would seem logical that the right would herniated disc at C4-5 could be the etiology of the majority of her complaints.  The herniated disc at C4-5 does indent the spinal cord a bit.  I did explain to her that down the road this may continue to worsen, and she may become more symptomatic.  At this point she is just not mentally prepared or in a position to except surgery.  She wants to exhaust all nonsurgical treatment options first.  She wants to follow a stepwise plan to address her complaints.  I felt this was very reasonable.  Instead of her having to  go back to her PCP to get a referral to pain management, I was glad to place one for her today.  I answered all of her questions today.  She was very pleased with the results of her     Subjective/Objective     Chief Complaint    Neck pain as well as pain that radiates to the right shoulder region.    HPI    This is a pleasant 39-year-old female who has a history of multiple sclerosis and followed by neurology.  She has had some increased pain in her neck that radiates to the right shoulder region for about 3 months.  She has worked with physical therapy and this has provided some relief.  She has not had any formal pain management as of yet.  The pain also occasionally goes into her right hand and she states that she gets some tingling in her hands at times.  She does have some increased muscle spasms in the neck as well.  She has tried to take Robaxin, meloxicam, as well as gabapentin for her pain.  She is here today to review her imaging studies and discuss the prospects of any surgical intervention.    ILIANA BARRIENTOS personally reviewed and updated.    Review of Systems   Constitutional: Negative.    HENT: Negative.     Eyes: Negative.    Respiratory: Negative.     Cardiovascular: Negative.    Gastrointestinal: Negative.    Endocrine: Negative.    Genitourinary: Negative.    Musculoskeletal:  Positive for myalgias, neck pain and neck stiffness.        Cervical spondylosis   C/S MRI & T/S MRI on 7/2024  EMG- none    Neck pain radiates to right shoulder and arm x 3 months   + N/T on Right hand   Decrease ROM, worse when looking to left   + Muscle spasm  Pain 7/10- constant, throbbing, shooting  Meds: Robaxin, Meloxicam, Gabapentin   PT @ [facility]- does help /PM- none  H/o MS-   No AC/ former smoker/ no previous neck sx     Skin: Negative.    Allergic/Immunologic: Negative.    Neurological:  Positive for numbness.   Hematological: Negative.    Psychiatric/Behavioral: Negative.     All other systems reviewed and are  negative.      Family History    No family history on file.    Social History    Social History     Socioeconomic History    Marital status: Single     Spouse name: Not on file    Number of children: Not on file    Years of education: Not on file    Highest education level: Not on file   Occupational History    Not on file   Tobacco Use    Smoking status: Former     Current packs/day: 0.00     Average packs/day: 0.3 packs/day for 16.0 years (5.0 ttl pk-yrs)     Types: Cigarettes     Start date: 7/15/1999     Quit date: 2011     Years since quittin.0    Smokeless tobacco: Never   Vaping Use    Vaping status: Never Used   Substance and Sexual Activity    Alcohol use: Not Currently     Comment: socially with mom    Drug use: Yes     Types: Marijuana     Comment: not medical    Sexual activity: Yes     Partners: Male     Birth control/protection: I.U.D.   Other Topics Concern    Not on file   Social History Narrative    Not on file     Social Determinants of Health     Financial Resource Strain: Not on file   Food Insecurity: Not on file   Transportation Needs: Not on file   Physical Activity: Not on file   Stress: Not on file   Social Connections: Unknown (2024)    Received from [facility]     How often do you feel lonely or isolated from those around you? (Adult - for ages 18 years and over): Not on file   Intimate Partner Violence: Not on file   Housing Stability: Not on file       Past Medical History    Past Medical History:   Diagnosis Date    Anxiety 2020    Depression 2020    Multiple sclerosis (HCC)        Surgical History    Past Surgical History:   Procedure Laterality Date     SECTION      IR LUMBAR PUNCTURE  2021       Medications      Current Outpatient Medications:     buPROPion (WELLBUTRIN XL) 150 mg 24 hr tablet, Take 1 tablet by mouth every morning, Disp: , Rfl:     fluticasone (FLONASE) 50 mcg/act nasal spray, 2 sprays into each nostril daily, Disp: ,  Rfl:     gabapentin (NEURONTIN) 100 mg capsule, take 200 mg bid (in addition to separate rx for 1200 mg qhs), Disp: 360 capsule, Rfl: 0    gabapentin (NEURONTIN) 600 MG tablet, Take 2 tablets (1,200 mg total) by mouth daily at bedtime, Disp: 180 tablet, Rfl: 0    lidocaine (LIDODERM) 5 %, Apply 1 patch topically daily as needed (R. Knee pain) Remove & Discard patch within 12 hours or as directed by MD, Disp: 15 patch, Rfl: 0    meloxicam (Mobic) 15 mg tablet, Take 1 tablet (15 mg total) by mouth daily With food, Disp: 45 tablet, Rfl: 2    meloxicam (MOBIC) 7.5 mg tablet, Take 7.5 mg by mouth daily, Disp: , Rfl:     methocarbamol (ROBAXIN) 750 mg tablet, Take 1 tablet (750 mg total) by mouth every 6 (six) hours as needed for muscle spasms, Disp: 120 tablet, Rfl: 3  No current facility-administered medications for this visit.    Facility-Administered Medications Ordered in Other Visits:     alteplase (CATHFLO) injection 2 mg, 2 mg, Intracatheter, Q1MIN PRN, Nadeen Logan MD    Allergies    Allergies   Allergen Reactions    Bee Pollen Other (See Comments)     Other reaction(s): Nasal Congestion    Pollen Extract Nasal Congestion       The following portions of the patient's history were reviewed and updated as appropriate: allergies, current medications, past family history, past medical history, past social history, past surgical history, and problem list.    Investigations    I personally reviewed the MRI cervical spine 7/7/24 results with the patient:    1. Stable cervical cord lesions in keeping with demyelinating disease. No evidence of active demyelination/inflammation.     2. Multilevel spondylotic changes of the cervical spine, most notably moderate central spinal canal stenosis with ventral indentation of the thecal sac at C6-C7 and rightward C4-5 herniated disc with some indentation of spinal cord.     .MRI thoracic spine 7/13/24    1. Stable patchy cord signal abnormality at T8-T9, most consistent  with demyelinating disease. No evidence of active inflammation/demyelination.     2. Partially evaluated multilevel cervical spine posterior disc abnormalities, notably at C6-C7 where a posterior disc bulge with superimposed central disc herniation and annular fissure result in at least moderate central spinal canal stenosis,   appearing to be slightly increased from MRI cervical spine 5/28/2023.       MRI brain 7/7/24     Distribution and configuration of white matter lesions described above are nonspecific but consistent with demyelinating plaques of multiple sclerosis.  No active demyelination evident.   Interval normalization of previously noted gadolinium enhancing plaques. No other interval change evident compared to the prior.    Physical Exam    Vitals:  not currently breastfeeding.,There is no height or weight on file to calculate BMI.    Physical Exam  General appearance: alert, appears stated age, cooperative and no distress  Head: Normocephalic, without obvious abnormality, atraumatic  Eyes: EOMI, PERRL  Neck: supple, symmetrical, trachea midline and NT  Back: no kyphosis present, no tenderness to percussion or palpation  Lungs: non labored breathing, equal breath sounds bilaterally without wheezing or crackles  Heart: regular heart rate, normal S1, S2, no murmors  Abdomen: Soft, nontender, nondistended. No pain to palpation  Neurologic:   Mental status: Alert, oriented x 3, thought content appropriate  Cranial nerves: grossly intact (Cranial nerves II-XII)  Sensory: normal to LT  Motor: moving all extremities without focal weakness   Reflexes: 2+ and symmetric  Coordination: finger to nose normal bilaterally, no drift bilaterally       June Wallis MD

## 2024-09-04 NOTE — PROGRESS NOTES
2024      Chief Complaint   Patient presents with    Follow-up         Assessment and Plan    39 y.o. female new patient     Microhematuria  - UA micro from 24 showing 10-20 RBCs  - Urine dip today + blood and leuks. Negative nitrites  - PVR 6 mL  - Obtain renal bladder US   - Discussed cystoscopy for complete work-up which she is agreeable to     History of Present Illness  Charisse Bowman is a 39 y.o. female here for new patient evaluation of microhematuria. This was noted on recent urine testing. She has history of LINDA and MS. She denies any urinary symptoms or episodes of gross hematuria. No prior  surgeries. No family history of  malignancy. Former smoker x16 years.     Review of Systems   Constitutional:  Negative for chills and fever.   Respiratory:  Negative for shortness of breath.    Cardiovascular:  Negative for chest pain.   Gastrointestinal:  Negative for abdominal pain.   Genitourinary:  Negative for difficulty urinating, dysuria, flank pain, frequency, hematuria and urgency.   Neurological:  Negative for dizziness.         Past Medical History  Past Medical History:   Diagnosis Date    Anxiety 2020    Depression 2020    Multiple sclerosis (HCC)        Past Social History  Past Surgical History:   Procedure Laterality Date     SECTION      IR LUMBAR PUNCTURE  2021     Social History     Tobacco Use   Smoking Status Former    Current packs/day: 0.00    Average packs/day: 0.3 packs/day for 16.0 years (5.0 ttl pk-yrs)    Types: Cigarettes    Start date: 7/15/1999    Quit date: 2011    Years since quittin.1   Smokeless Tobacco Never       Past Family History  History reviewed. No pertinent family history.    Past Social history  Social History     Socioeconomic History    Marital status: Single     Spouse name: Not on file    Number of children: Not on file    Years of education: Not on file    Highest education level: Not on file   Occupational History    Not on file    Tobacco Use    Smoking status: Former     Current packs/day: 0.00     Average packs/day: 0.3 packs/day for 16.0 years (5.0 ttl pk-yrs)     Types: Cigarettes     Start date: 7/15/1999     Quit date: 2011     Years since quittin.1    Smokeless tobacco: Never   Vaping Use    Vaping status: Never Used   Substance and Sexual Activity    Alcohol use: Not Currently     Comment: socially with mom    Drug use: Yes     Types: Marijuana     Comment: not medical    Sexual activity: Yes     Partners: Male     Birth control/protection: I.U.D.   Other Topics Concern    Not on file   Social History Narrative    Not on file     Social Determinants of Health     Financial Resource Strain: Not on file   Food Insecurity: Not on file   Transportation Needs: Not on file   Physical Activity: Not on file   Stress: Not on file   Social Connections: Unknown (2024)    Received from [facility]     How often do you feel lonely or isolated from those around you? (Adult - for ages 18 years and over): Not on file   Intimate Partner Violence: Not on file   Housing Stability: Not on file       Current Medications  Current Outpatient Medications   Medication Sig Dispense Refill    buPROPion (WELLBUTRIN XL) 150 mg 24 hr tablet Take 1 tablet by mouth every morning      fluticasone (FLONASE) 50 mcg/act nasal spray 2 sprays into each nostril daily      gabapentin (NEURONTIN) 100 mg capsule take 200 mg bid (in addition to separate rx for 1200 mg qhs) 360 capsule 0    gabapentin (NEURONTIN) 600 MG tablet Take 2 tablets (1,200 mg total) by mouth daily at bedtime 180 tablet 0    meloxicam (Mobic) 15 mg tablet Take 1 tablet (15 mg total) by mouth daily With food 45 tablet 2    meloxicam (MOBIC) 7.5 mg tablet Take 7.5 mg by mouth daily      methocarbamol (ROBAXIN) 750 mg tablet Take 1 tablet (750 mg total) by mouth every 6 (six) hours as needed for muscle spasms 120 tablet 3    lidocaine (LIDODERM) 5 % Apply 1 patch  "topically daily as needed (R. Knee pain) Remove & Discard patch within 12 hours or as directed by MD 15 patch 0     No current facility-administered medications for this visit.       Allergies  Allergies   Allergen Reactions    Bee Pollen Other (See Comments)     Other reaction(s): Nasal Congestion    Pollen Extract Nasal Congestion         The following portions of the patient's history were reviewed and updated as appropriate: allergies, current medications, past medical history, past social history, past surgical history and problem list.      Vitals  Vitals:    09/05/24 0942   BP: 121/72   BP Location: Left arm   Patient Position: Sitting   Cuff Size: Standard   Pulse: 61   Temp: 97.5 °F (36.4 °C)   TempSrc: Temporal   SpO2: 98%   Weight: 62.1 kg (137 lb)   Height: 5' 1\" (1.549 m)           Physical Exam  Physical Exam  Constitutional:       Appearance: Normal appearance.   HENT:      Head: Normocephalic and atraumatic.      Right Ear: External ear normal.      Left Ear: External ear normal.      Nose: Nose normal.   Eyes:      General: No scleral icterus.     Conjunctiva/sclera: Conjunctivae normal.   Cardiovascular:      Pulses: Normal pulses.   Pulmonary:      Effort: Pulmonary effort is normal.   Musculoskeletal:         General: Normal range of motion.      Cervical back: Normal range of motion.   Neurological:      General: No focal deficit present.      Mental Status: She is alert and oriented to person, place, and time.   Psychiatric:         Mood and Affect: Mood normal.         Behavior: Behavior normal.         Thought Content: Thought content normal.         Judgment: Judgment normal.           Results  Recent Results (from the past 1 hour(s))   POCT urine dip    Collection Time: 09/05/24  9:44 AM   Result Value Ref Range    LEUKOCYTE ESTERASE,UA +     NITRITE,UA -     SL AMB POCT UROBILINOGEN 0.2     POCT URINE PROTEIN 0.15      PH,UA 5.0     BLOOD,UA +     SPECIFIC GRAVITY,UA 1.030     KETONES,UA - " "    BILIRUBIN,UA -     GLUCOSE, UA -      COLOR,UA yelllow     CLARITY,UA cloudy    POCT Measure PVR    Collection Time: 09/05/24  9:47 AM   Result Value Ref Range    POST-VOID RESIDUAL VOLUME, ML POC 6 mL   ]  No results found for: \"PSA\"  Lab Results   Component Value Date    CALCIUM 8.9 07/23/2024    K 4.0 07/23/2024    CO2 26 07/23/2024     07/23/2024    BUN 13 07/23/2024    CREATININE 0.84 07/23/2024     Lab Results   Component Value Date    WBC 7.01 07/23/2024    HGB 11.5 07/23/2024    HCT 37.6 07/23/2024    MCV 73 (L) 07/23/2024     07/23/2024           Orders  Orders Placed This Encounter   Procedures    POCT urine dip    POCT Measure PVR       Paulina Ventura      "

## 2024-09-05 ENCOUNTER — OFFICE VISIT (OUTPATIENT)
Dept: UROLOGY | Facility: CLINIC | Age: 39
End: 2024-09-05
Payer: COMMERCIAL

## 2024-09-05 ENCOUNTER — TELEPHONE (OUTPATIENT)
Dept: UROLOGY | Facility: CLINIC | Age: 39
End: 2024-09-05

## 2024-09-05 VITALS
BODY MASS INDEX: 25.86 KG/M2 | HEART RATE: 61 BPM | HEIGHT: 61 IN | SYSTOLIC BLOOD PRESSURE: 121 MMHG | OXYGEN SATURATION: 98 % | DIASTOLIC BLOOD PRESSURE: 72 MMHG | TEMPERATURE: 97.5 F | WEIGHT: 137 LBS

## 2024-09-05 DIAGNOSIS — R31.29 MICROHEMATURIA: Primary | ICD-10-CM

## 2024-09-05 LAB
BACTERIA UR QL AUTO: ABNORMAL /HPF
BILIRUB UR QL STRIP: NEGATIVE
CLARITY UR: CLEAR
COLOR UR: YELLOW
GLUCOSE UR STRIP-MCNC: NEGATIVE MG/DL
HGB UR QL STRIP.AUTO: NEGATIVE
KETONES UR STRIP-MCNC: NEGATIVE MG/DL
LEUKOCYTE ESTERASE UR QL STRIP: ABNORMAL
MUCOUS THREADS UR QL AUTO: ABNORMAL
NITRITE UR QL STRIP: NEGATIVE
NON-SQ EPI CELLS URNS QL MICRO: ABNORMAL /HPF
PH UR STRIP.AUTO: 6 [PH]
POST-VOID RESIDUAL VOLUME, ML POC: 6 ML
PROT UR STRIP-MCNC: ABNORMAL MG/DL
RBC #/AREA URNS AUTO: ABNORMAL /HPF
SL AMB  POCT GLUCOSE, UA: NORMAL
SL AMB LEUKOCYTE ESTERASE,UA: NORMAL
SL AMB POCT BILIRUBIN,UA: NORMAL
SL AMB POCT BLOOD,UA: NORMAL
SL AMB POCT CLARITY,UA: NORMAL
SL AMB POCT COLOR,UA: NORMAL
SL AMB POCT KETONES,UA: NORMAL
SL AMB POCT NITRITE,UA: NORMAL
SL AMB POCT PH,UA: 5
SL AMB POCT SPECIFIC GRAVITY,UA: 1.03
SL AMB POCT URINE PROTEIN: 0.15
SL AMB POCT UROBILINOGEN: 0.2
SP GR UR STRIP.AUTO: 1.03 (ref 1–1.03)
UROBILINOGEN UR STRIP-ACNC: 2 MG/DL
WBC #/AREA URNS AUTO: ABNORMAL /HPF

## 2024-09-05 PROCEDURE — 51798 US URINE CAPACITY MEASURE: CPT | Performed by: PHYSICIAN ASSISTANT

## 2024-09-05 PROCEDURE — 81002 URINALYSIS NONAUTO W/O SCOPE: CPT | Performed by: PHYSICIAN ASSISTANT

## 2024-09-05 PROCEDURE — 81001 URINALYSIS AUTO W/SCOPE: CPT | Performed by: PHYSICIAN ASSISTANT

## 2024-09-05 PROCEDURE — 99203 OFFICE O/P NEW LOW 30 MIN: CPT | Performed by: PHYSICIAN ASSISTANT

## 2024-09-06 ENCOUNTER — TELEPHONE (OUTPATIENT)
Dept: NEUROLOGY | Facility: CLINIC | Age: 39
End: 2024-09-06

## 2024-09-06 NOTE — TELEPHONE ENCOUNTER
Pt is scheduled for Tysabri on 9/17/24.   Last infusion 8/20/24, infusions every 4 weeks. Confirmed scheduling is correct.      Last CBC/CMP 7/23/24  Last JCV 7/23/24 0.17 (negative)  Last MRI brain 7/7/24     Okay to proceed with infusion?     Tysabri is approved with [employer] from 2/23/24 to 2/23/25 at [facility]. Auth # [id number]. See 6/21/24 encounter. No review needed for site NPI/Tax ID change.     Tysabri TOUCH auth valid until 1/29/25 at [facility].

## 2024-09-09 ENCOUNTER — TELEPHONE (OUTPATIENT)
Dept: NEUROLOGY | Facility: CLINIC | Age: 39
End: 2024-09-09

## 2024-09-09 NOTE — TELEPHONE ENCOUNTER
LMOM to confirm patient's appointment on 9/18  with Damián. Reminded pt to arrive 15 min prior to allow enough time for check in.

## 2024-09-17 ENCOUNTER — HOSPITAL ENCOUNTER (OUTPATIENT)
Dept: INFUSION CENTER | Facility: CLINIC | Age: 39
Discharge: HOME | End: 2024-09-17
Payer: COMMERCIAL

## 2024-09-17 VITALS
RESPIRATION RATE: 18 BRPM | TEMPERATURE: 96.8 F | DIASTOLIC BLOOD PRESSURE: 70 MMHG | SYSTOLIC BLOOD PRESSURE: 108 MMHG | HEART RATE: 86 BPM

## 2024-09-17 DIAGNOSIS — G35 MS (MULTIPLE SCLEROSIS) (HCC): Primary | ICD-10-CM

## 2024-09-17 PROCEDURE — 96365 THER/PROPH/DIAG IV INF INIT: CPT

## 2024-09-17 PROCEDURE — 96367 TX/PROPH/DG ADDL SEQ IV INF: CPT

## 2024-09-17 RX ORDER — ACETAMINOPHEN 325 MG/1
650 TABLET ORAL ONCE
OUTPATIENT
Start: 2024-10-15 | End: 2024-10-15

## 2024-09-17 RX ORDER — ACETAMINOPHEN 325 MG/1
650 TABLET ORAL ONCE
Status: COMPLETED | OUTPATIENT
Start: 2024-09-17 | End: 2024-09-17

## 2024-09-17 RX ORDER — SODIUM CHLORIDE 9 MG/ML
20 INJECTION, SOLUTION INTRAVENOUS ONCE
Status: COMPLETED | OUTPATIENT
Start: 2024-09-17 | End: 2024-09-17

## 2024-09-17 RX ORDER — SODIUM CHLORIDE 9 MG/ML
20 INJECTION, SOLUTION INTRAVENOUS ONCE
OUTPATIENT
Start: 2024-10-15

## 2024-09-17 RX ADMIN — SODIUM CHLORIDE 20 ML/HR: 0.9 INJECTION, SOLUTION INTRAVENOUS at 15:26

## 2024-09-17 RX ADMIN — ACETAMINOPHEN 650 MG: 325 TABLET, FILM COATED ORAL at 15:27

## 2024-09-17 RX ADMIN — DIPHENHYDRAMINE HYDROCHLORIDE 50 MG: 50 INJECTION, SOLUTION INTRAMUSCULAR; INTRAVENOUS at 15:30

## 2024-09-17 RX ADMIN — NATALIZUMAB 300 MG: 300 INJECTION INTRAVENOUS at 16:17

## 2024-09-17 NOTE — PROGRESS NOTES
Pt to clinic for Tysabri, offers no complaints today, pre-infusion checklist questions answered and submitted online, pt tolerated infusion without complications, pt refused to stay more than 15 minutes for observation today which she tolerated without complications, aware of next appointment on 10/15/24 at 3pm, PIV removed, avs printed and reviewed.

## 2024-09-18 NOTE — TELEPHONE ENCOUNTER
Patient called in stating she is having dental issues and has her mouth swollen so she had to rescheduled . Pt. Scheduled in October now.

## 2024-09-27 NOTE — PROGRESS NOTES
Assessment:  1. Cervical spinal stenosis    2. Cervical radiculopathy    3. Cervical spondylosis    4. Chronic pain syndrome    5. Myofascial pain syndrome        Plan:  Orders Placed This Encounter   Procedures   • Durable Medical Equipment     1 TENS UNIT, 2 lead   • FL spine and pain procedure     Standing Status:   Future     Standing Expiration Date:   9/30/2028     Order Specific Question:   Reason for Exam:     Answer:   TPI right trapezius     Order Specific Question:   Is the patient pregnant?     Answer:   Unknown     Order Specific Question:   Anticoagulant hold needed?     Answer:   No   • Ambulatory referral to Physical Therapy     Standing Status:   Future     Standing Expiration Date:   9/30/2025     Referral Priority:   Routine     Referral Type:   Physical Therapy     Referral Reason:   Specialty Services Required     Requested Specialty:   Physical Therapy     Number of Visits Requested:   1     Expiration Date:   9/30/2025       No orders of the defined types were placed in this encounter.      My impressions and treatment recommendations were discussed in detail with the patient, who verbalized understanding and had no further questions.    39-year-old female with history of multiple sclerosis presents her office with chronic neck pain radiating into the right shoulder region as well as cervical spasm.  She has signs of cervical spondylosis, stable demyelinating plaques and noted spinal stenosis at the C6-7 level.    She is neurologically intact on exam, but there is notable tenderness palpation in the right trapezius region and increased pain with cervical extension and leftward rotation.  There is a myofascial component to her symptoms and possibly symptoms related to underlying spinal stenosis. Will schedule TPI and order TENS unit.    She should also start PT for treatment of cervical spinal stenosis and myofascial pain.    Lastly, will order low dose naltrexone for MS symptoms. Will send to  [facility].    If no relief with TPI, then will schedule for EUSEBIA.    [place] Prescription Drug Monitoring Program report was reviewed and was appropriate     Complete risks and benefits including bleeding, infection, tissue reaction, nerve injury and allergic reaction were discussed. The approach was demonstrated using models and literature was provided. Verbal and written consent was obtained.    Discharge instructions were provided. I personally saw and examined the patient and I agree with the above discussed plan of care.    History of Present Illness:    Charisse Bowman is a 39 y.o. female who presents to [facility] for initial evaluation of the above stated pain complaints. The patient has a past medical and chronic pain history as outlined in the assessment section. She was referred by June Wallis MD  [address]   [address]  [place] [address] .      patient with chief complaint of neck pain radiating into the right shoulder region.  She gets tingling in her hand at times.  Also reports muscle spasms in the neck.    This is a chronic issue for 2 years.  Severe over the past month.  10 out of 10.  Nearly constant.  Sharp in nature.    She reports sharp pain in the right trapezius region. Sharp pain out of nowhere that is now happening more frequently. Cannot identify triggering factors. Turning to left is more painful. Does not radiate past the right shoulder.     She has history of multiple sclerosis with lesions in the cervical spine which have been stable.    She currently uses gabapentin and ibuprofen. She was previously on baclofen and switched to robaxin. Robaxin doesn't help with the right trapezius pain.     Review of Systems:    Review of Systems   Musculoskeletal:  Positive for myalgias, neck pain and neck stiffness.   Neurological:  Positive for headaches.           Past Medical History:   Diagnosis Date   • Anxiety 2020   • Depression     • Multiple sclerosis (HCC)        Past Surgical History:   Procedure Laterality Date   •  SECTION     • IR LUMBAR PUNCTURE  2021       History reviewed. No pertinent family history.    Social History     Occupational History   • Not on file   Tobacco Use   • Smoking status: Former     Current packs/day: 0.00     Average packs/day: 0.3 packs/day for 16.0 years (5.0 ttl pk-yrs)     Types: Cigarettes     Start date: 7/15/1999     Quit date: 2011     Years since quittin.1   • Smokeless tobacco: Never   Vaping Use   • Vaping status: Never Used   Substance and Sexual Activity   • Alcohol use: Not Currently     Comment: socially with mom   • Drug use: Yes     Types: Marijuana     Comment: not medical   • Sexual activity: Yes     Partners: Male     Birth control/protection: I.U.D.         Current Outpatient Medications:   •  buPROPion (WELLBUTRIN XL) 150 mg 24 hr tablet, Take 1 tablet by mouth every morning, Disp: , Rfl:   •  fluticasone (FLONASE) 50 mcg/act nasal spray, 2 sprays into each nostril daily, Disp: , Rfl:   •  gabapentin (NEURONTIN) 100 mg capsule, take 200 mg bid (in addition to separate rx for 1200 mg qhs), Disp: 360 capsule, Rfl: 0  •  gabapentin (NEURONTIN) 600 MG tablet, Take 2 tablets (1,200 mg total) by mouth daily at bedtime, Disp: 180 tablet, Rfl: 0  •  meloxicam (Mobic) 15 mg tablet, Take 1 tablet (15 mg total) by mouth daily With food, Disp: 45 tablet, Rfl: 2  •  meloxicam (MOBIC) 7.5 mg tablet, Take 7.5 mg by mouth daily, Disp: , Rfl:   •  methocarbamol (ROBAXIN) 750 mg tablet, Take 1 tablet (750 mg total) by mouth every 6 (six) hours as needed for muscle spasms, Disp: 120 tablet, Rfl: 3  •  lidocaine (LIDODERM) 5 %, Apply 1 patch topically daily as needed (R. Knee pain) Remove & Discard patch within 12 hours or as directed by MD, Disp: 15 patch, Rfl: 0    Allergies   Allergen Reactions   • Bee Pollen Other (See Comments)     Other reaction(s): Nasal Congestion   • Pollen  "Extract Nasal Congestion       Physical Exam:    /72   Pulse 83   Ht 5' 1\" (1.549 m)   Wt 62.1 kg (137 lb)   BMI 25.89 kg/m²     Constitutional: normal, well developed, well nourished, alert, in no distress and non-toxic and no overt pain behavior.  Eyes: anicteric  HEENT: grossly intact  Neck: supple, symmetric, trachea midline and no masses   Pulmonary:even and unlabored  Cardiovascular:No edema or pitting edema present  Skin:Normal without rashes or lesions and well hydrated  Psychiatric:Mood and affect appropriate  Neurologic:Cranial Nerves II-XII grossly intact  Musculoskeletal:normal    Cervical Spine Exam    Appearance:  Normal lordosis  Palpation/Tenderness:  right trapezium tenderness  Sensory:  no sensory deficits noted  Range of Motion:  Flexion:  Minimally limited  with pain  Extension:  Moderately limited  with pain  Rotation - Left:  Severely limited  with pain  Rotation - Right:  No limitation  with pain  Motor Strength:  Left Arm Flexion 5/5  Left Arm Extension 5/5  Right Arm Flexion 5/5  Right Arm Extension 5/5  Left Wrist Flexion 5/5  Left Wrist Extension 5/5  Left Finger Abduction 5/5  Right Finger Abduction 5/5  Left Pincer Grasp 5/5  Right Pincer Grasp 5/5  Left  5/5  Right  5/5  Reflexes:  Left Biceps:  3+   Right Biceps:  3+   Left Brachioradialis:  3+   Right Brachioradialis:  3+   Left Triceps:  3+   Right Triceps:  3+         Imaging    MRI THORACIC SPINE WITH AND WITHOUT CONTRAST  7/13/24     INDICATION: G35: Multiple sclerosis.     COMPARISON: MRI thoracic spine 7/14/2021.     TECHNIQUE:  Multiplanar, multisequence imaging of the thoracic spine was performed before and after gadolinium administration. .     IV Contrast:  6 mL of Gadobutrol injection (SINGLE-DOSE)     IMAGE QUALITY:  Diagnostic.     FINDINGS:     ALIGNMENT:  Normal alignment of the thoracic spine.  No acute fracture.     MARROW SIGNAL: No suspect marrow lesion.     THORACIC CORD: Redemonstrated cord " signal abnormality at T8-T9. Multifocal cervical spine cord signal abnormality is partially visualized.     PREVERTEBRAL AND PARASPINAL SOFT TISSUES:   No acute abnormality.     THORACIC DEGENERATIVE CHANGE: Multilevel thoracic spine spondylotic changes without acute critical central canal stenosis.     POSTCONTRAST:  No abnormal enhancement.     OTHER FINDINGS: Multilevel cervical spine posterior disc abnormalities resulting in multilevel at least moderate central spinal canal stenosis, incompletely evaluated, though notably at C6-C7, where there is a posterior disc bulge with superimposed   central disc herniation and annular fissure, appearing slightly increased from MRI cervical spine 5/28/2023.     IMPRESSION:     1. Stable patchy cord signal abnormality at T8-T9, most consistent with demyelinating disease. No evidence of active inflammation/demyelination.     2. Partially evaluated multilevel cervical spine posterior disc abnormalities, notably at C6-C7 where a posterior disc bulge with superimposed central disc herniation and annular fissure result in at least moderate central spinal canal stenosis,   appearing to be slightly increased from MRI cervical spine 5/28/2023. This could be further evaluated with dedicated MRI of the cervical spine, as clinically appropriate.      MRI CERVICAL SPINE WITH AND WITHOUT CONTRAST  7/7/24     INDICATION: G35: Multiple sclerosis.     COMPARISON: MRI cervical spine 5/28/2023.     TECHNIQUE:  Multiplanar, multisequence imaging of the cervical spine was performed before and after gadolinium administration. .        IV Contrast:  6 mL of Gadobutrol injection (SINGLE-DOSE)     IMAGE QUALITY:  Diagnostic.     FINDINGS:     ALIGNMENT: Nonspecific reversal of the normal cervical lordosis. No acute fracture.     MARROW SIGNAL: Multilevel endplate centered marrow changes.     CERVICAL AND VISUALIZED UPPER THORACIC CORD: Stable cervical cord lesions, including craniocervical  junction, C2-C3, C4 and C7-T1.     PREVERTEBRAL AND PARASPINAL SOFT TISSUES: No acute abnormality.     VISUALIZED POSTERIOR FOSSA: Please refer to concurrent brain MRI 7/7/2024.     CERVICAL DISC SPACES: Multilevel mild to moderate degenerative disc disease, greatest from C4-C6.     C2-C3: No significant neuroforaminal narrowing or spinal canal stenosis.     C3-C4: Annular fissure. No significant neuroforaminal narrowing or spinal canal stenosis.     C4-C5: Disc osteophyte complex with superimposed right paracentral disc protrusion that ventrally indents the thecal sac. Uncovertebral and facet arthropathy contribute to mild left neuroforaminal narrowing. Mild spinal canal stenosis.     C5-C6: Disc osteophyte complex. Uncovertebral and facet arthropathy contribute to mild bilateral neuroforaminal narrowing, right greater than left. Mild spinal canal stenosis.     C6-C7: Disc osteophyte complex with a superimposed central disc herniation with annular tear. No significant neuroforaminal narrowing. There is moderate spinal canal stenosis with ventral indentation of the thecal sac.     C7-T1: No significant neuroforaminal narrowing or spinal canal stenosis.     UPPER THORACIC DISC SPACES: Please refer to concurrent MRI thoracic spine 7/13/2024.     POSTCONTRAST IMAGING: No abnormal enhancement.     OTHER FINDINGS: Nonspecific cystic lesion along the left hemimandible (series 14, image 76), possibly related to a periapical lucency. Thyromegaly.     IMPRESSION:     1. Stable cervical cord lesions in keeping with demyelinating disease. No evidence of active demyelination/inflammation.     2. Multilevel spondylotic changes of the cervical spine, most notably moderate central spinal canal stenosis with ventral indentation of the thecal sac at C6-C7.     3. Nonspecific cystic left hemimandibular lesion, possibly periapical lucency. Correlate with dental examination.     4. Thyromegaly.    FL spine and pain procedure     (Results Pending)       Orders Placed This Encounter   Procedures   • Durable Medical Equipment   • FL spine and pain procedure   • Ambulatory referral to Physical Therapy

## 2024-09-30 ENCOUNTER — DOCUMENTATION (OUTPATIENT)
Dept: PAIN MEDICINE | Facility: CLINIC | Age: 39
End: 2024-09-30

## 2024-09-30 ENCOUNTER — CONSULT (OUTPATIENT)
Dept: PAIN MEDICINE | Facility: CLINIC | Age: 39
End: 2024-09-30
Payer: COMMERCIAL

## 2024-09-30 ENCOUNTER — TELEPHONE (OUTPATIENT)
Age: 39
End: 2024-09-30

## 2024-09-30 VITALS
DIASTOLIC BLOOD PRESSURE: 72 MMHG | BODY MASS INDEX: 25.86 KG/M2 | HEART RATE: 83 BPM | HEIGHT: 61 IN | SYSTOLIC BLOOD PRESSURE: 118 MMHG | WEIGHT: 137 LBS

## 2024-09-30 DIAGNOSIS — M79.18 MYOFASCIAL PAIN SYNDROME: ICD-10-CM

## 2024-09-30 DIAGNOSIS — M48.02 CERVICAL SPINAL STENOSIS: Primary | ICD-10-CM

## 2024-09-30 DIAGNOSIS — G89.4 CHRONIC PAIN SYNDROME: ICD-10-CM

## 2024-09-30 DIAGNOSIS — M47.812 CERVICAL SPONDYLOSIS: ICD-10-CM

## 2024-09-30 DIAGNOSIS — M54.12 CERVICAL RADICULOPATHY: ICD-10-CM

## 2024-09-30 DIAGNOSIS — G35 MS (MULTIPLE SCLEROSIS) (HCC): ICD-10-CM

## 2024-09-30 PROCEDURE — 99204 OFFICE O/P NEW MOD 45 MIN: CPT | Performed by: STUDENT IN AN ORGANIZED HEALTH CARE EDUCATION/TRAINING PROGRAM

## 2024-09-30 RX ORDER — GABAPENTIN 600 MG/1
1200 TABLET ORAL
Qty: 180 TABLET | Refills: 0 | Status: SHIPPED | OUTPATIENT
Start: 2024-09-30

## 2024-09-30 RX ORDER — GABAPENTIN 100 MG/1
CAPSULE ORAL
Qty: 360 CAPSULE | Refills: 0 | Status: SHIPPED | OUTPATIENT
Start: 2024-09-30

## 2024-09-30 NOTE — PATIENT INSTRUCTIONS
Adult Patient Counseling Points     Naltrexone   What is this drug used for?   It is used to help keep you alcohol-free.  It is used to keep a drug-free state.  It may be given for other reasons.  All drugs may cause side effects. However, many people have no side effects or only have minor side effects. Call your doctor or get medical help if any of these side effects or any other side effects bother you or do not go away:   Anxiety  Abdominal pain  Muscle cramps  Constipation  Trouble sleeping  Nausea  Vomiting  Joint pain  Muscle pain  Lack of appetite  Fatigue  Loss of strength and energy  Common cold symptoms  Tooth pain  Dry mouth  Increased thirst  Nose irritation  Throat irritation  Back pain  Diarrhea  WARNING/CAUTION: Even though it may be rare, some people may have very bad and sometimes deadly side effects when taking a drug. Tell your doctor or get medical help right away if you have any of the following signs or symptoms that may be related to a very bad side effect:   Liver problems like dark urine, fatigue, lack of appetite, nausea, abdominal pain, light-colored stools, vomiting, or yellow skin  Depression like thoughts of suicide, anxiety, agitation, irritability, panic attacks, mood changes, behavioral changes, or confusion  Severe dizziness  Passing out  Severe headache  Vision changes  Sexual dysfunction (males)  Confusion  Trouble breathing  Slow breathing  Shallow breathing  Lung problems like shortness of breath or other trouble breathing, cough that is new or worse  Severe injection site blisters, scabs, pain, swelling, lumps, or irritation  Signs of an allergic reaction, like rash; hives; itching; red, swollen, blistered, or peeling skin with or without fever; wheezing; tightness in the chest or throat; trouble breathing, swallowing, or talking; unusual hoarseness; or swelling of the mouth, face, lips, tongue, or throat.  Note: This is not a comprehensive list of all side effects. Talk to  "your doctor if you have questions.  Consumer Information Use and Disclaimer: This information should not be used to decide whether or not to take this medicine or any other medicine. Only the healthcare provider has the knowledge and training to decide which medicines are right for a specific patient. This information does not endorse any medicine as safe, effective, or approved for treating any patient or health condition. This is only a limited summary of general information about the medicine’s uses from the patient education leaflet and is not intended to be comprehensive. This limited summary does NOT include all information available about the possible uses, directions, warnings, precautions, interactions, adverse effects, or risks that may apply to this medicine. This information is not intended to provide medical advice, diagnosis or treatment and does not replace information you receive from the healthcare provider. For a more detailed summary of information about the risks and benefits of using this medicine, please speak with your healthcare provider and review the entire patient education leaflet.  Copyright   © 2024 UpToDate, Inc. and its affiliates and/or licensors. All rights reserved.  Last Reviewed Date   2021-03-30  Patient Education     Epidural injection   The Basics   Written by the doctors and editors at [facility]   What is an epidural injection? -- An epidural injection can be used to treat a condition called \"radiculopathy.\" This is the medical term for the pain, weakness, numbness, or tingling that happens when nerves coming from the spinal cord get pinched or damaged.  The doctor injects medicines into the space outside the covering of the spinal cord (figure 1). This is similar to an \"epidural\" that is used for pain relief during labor and childbirth.  Epidural injections can be given into different parts of your back:   Cervical epidural injection - Used to help with pain in the head or " "arms.   Thoracic epidural injection - Used to help with pain in the upper or middle back.   Lumbar epidural injection - Used to help with pain in the lower back or legs.  How do I prepare for an epidural injection? -- The doctor or nurse will tell you if you need to do anything special to prepare. Before your procedure, your doctor will do an exam. They might send you to get tests, such as:   X-ray, ultrasound, or other imaging tests - Imaging tests create pictures of the inside of the body.  Your doctor will also ask you about your \"health history.\" This involves asking you questions about any health problems you have or had in the past, past surgeries, and any medicines you take. Tell them about:   Any medicines you are taking - This includes any prescription or \"over-the-counter\" medicines you use, plus any herbal supplements you take. It helps to write down and bring a list of any medicines you take, or bring a bag with all of your medicines with you.   Any allergies you have   Any bleeding problems you have - Certain medicines, including some herbs and supplements, can increase the risk of bleeding. Some health conditions also increase this risk.  You will also get information about:   Eating and drinking before your procedure - In some cases, you might need to \"fast\" before surgery. This means not eating or drinking anything for a period of time. In other cases, you might be allowed to have liquids until a short time before the procedure. Whether you need to fast, and for how long, depends on the procedure you are having.   What help you will need when you go home - For example, you might need to have someone else bring you home or stay with you for some time while you recover.  Ask the doctor or nurse if you have questions or if there is anything you do not understand.  What happens during an epidural injection? -- When it is time for the procedure:   You might get an \"IV,\" which is a thin tube that goes into " "a vein. This can be used to give you fluids and medicines.   You will get anesthesia medicines to numb the area where the doctor will give the injection. This is to make sure that you do not feel pain during the procedure. You might also get medicines to make you relax and feel sleepy, called \"sedatives.\"   The doctors and nurses will monitor your breathing, blood pressure, and heart rate during the procedure.   The doctor might use a continuous X-ray called \"fluoroscopy.\" This is to help make sure that the medicines are injected into the right place. The doctor might also inject a dye to see where to give the medicine.   The doctor will place a needle through your skin and inject the medicine into a space near your spine. Then, they will remove the needle and cover the area with a clean bandage.   The procedure takes 15 to 30 minutes.  What happens after an epidural injection? -- After your procedure, the staff will watch you closely for a short time. It might take a few days before you feel the effects of the epidural injection.  Before you go home, make sure that you know what problems to look out for and when to call the doctor. Make sure that you understand your doctor's or nurse's instructions. Ask questions about anything you do not understand.  For the rest of the day after your procedure:   Try to rest. Limit activities like exercise or driving.   The doctor might recommend an over-the-counter pain medicine. These include acetaminophen (sample brand name: Tylenol), ibuprofen (sample brand names: Advil, Motrin), and naproxen (sample brand name: Aleve).   Ice can help with pain and swelling. Put a cold gel pack, bag of ice, or bag of frozen vegetables on the injection site area every 1 to 2 hours, for 15 minutes each time. Put a thin towel between the ice (or other cold object) and the skin.  What are the risks of an epidural injection? -- Your doctor will talk to you about all of the possible risks, and " "answer your questions. Possible risks include:   Bleeding   Infection   Headache   Nerve injury  When should I call the doctor? -- Call for emergency help right away (in the [place] and [place], call 9-1-1) if:   You can't move your arms or legs.  Call for advice if:   You have a fever of 100.4°F (38°C) or higher, or chills.   You have redness or swelling around the injection site.   You have a headache.   Your arms or legs are numb, weak, or tingly.  All topics are updated as new evidence becomes available and our peer review process is complete.  This topic retrieved from [facility] [employer] on: May 15, 2024.  Topic 717470 Version 1.0  Release: 32.4.3 - C32.134  © 2024 UpToDate, Inc. and/or its affiliates. All rights reserved.  figure 1: Epidural injection     Duringan epidural injection, the doctor inserts a needle between 2 of the bones thatmake up the spine. Then, they inject medicines into the area around the spinalcord. This is an illustration of a \"lumbar\" epidural injection, which is given into the low back. The doctor can place the needle in other areas to treat other types of pain.  Graphic 678552 Version 1.0  Consumer Information Use and Disclaimer   Disclaimer: This generalized information is a limited summary of diagnosis, treatment, and/or medication information. It is not meant to be comprehensive and should be used as a tool to help the user understand and/or assess potential diagnostic and treatment options. It does NOT include all information about conditions, treatments, medications, side effects, or risks that may apply to a specific patient. It is not intended to be medical advice or a substitute for the medical advice, diagnosis, or treatment of a health care provider based on the health care provider's examination and assessment of a patient's specific and unique circumstances. Patients must speak with a health care provider for complete information about their health, medical questions, and treatment " options, including any risks or benefits regarding use of medications. This information does not endorse any treatments or medications as safe, effective, or approved for treating a specific patient. UpToDate, Inc. and its affiliates disclaim any warranty or liability relating to this information or the use thereof.The use of this information is governed by the Terms of Use, available at https://www.[employer].com/en/know/clinical-effectiveness-terms. 2024© UpToDate, Inc. and its affiliates and/or licensors. All rights reserved.  Copyright   © 2024 UpToDate, Inc. and/or its affiliates. All rights reserved.

## 2024-09-30 NOTE — TELEPHONE ENCOUNTER
Pt called in. She does need the refills on the gabapentin she accidentally sent a request to her PCP and not to Ar[facility] in [facility].  Pt does need the following:    Gabapentin 100 mg capsules, take 2 capsules (200 mg total) orally BID  Gabapentin 600 mg tablets, take 2 tablets (1200 mg total) orally at HS.    Please send to [facility] on file.     Thank you.

## 2024-09-30 NOTE — TELEPHONE ENCOUNTER
Christina calling from [facility] to advise Pt had Gabapentin orders from Archana MELENDREZ.     Gabapentin 100 mg capsules, take 2 capsules (200 mg total) orally BID  Gabapentin 600 mg tablets, take 2 tablets (1200 mg total) orally at HS.    Christina informing she received a refill script from patients PCP Dr Boles  for Gabapentin 300 mg capsule take 2 capsules (600 mg total) orall daily at bedtime . Christina calling as she wants to confirm which provider is prescribing Gabapentin  PCP Dr Boles or is Cassie MELENDREZ ? I informed [facility] MELENDREZ is prescriber. As Archana also discontinued the Gabapentin 600 mg daily dose at HS,  on 7/5/24. Christina, did leave msgs for PCP, that she can't fill their script and will cancel the PCP script out since Archana Steel is prescriber for Gabapentin for patient. Christina will also update pt as well, to contact our office if needing future Gabapentin refills.     I also sent pt a my chart msg, asking if pt needs refills for Gabapentin as her next OV  is on 10/29/24 with Archana MELENDREZ. Waiting for pt to reply back in order to proceed if she does in fact need refills.

## 2024-09-30 NOTE — TELEPHONE ENCOUNTER
Archana: medication pended. Please sign if agreeable.     --Gabapentin 100 mg capsules, take 2 capsules (200 mg total) orally BID    --Gabapentin 600 mg tablets, take 2 tablets (1200 mg total) orally at HS.

## 2024-10-04 ENCOUNTER — TELEPHONE (OUTPATIENT)
Dept: NEUROLOGY | Facility: CLINIC | Age: 39
End: 2024-10-04

## 2024-10-04 NOTE — TELEPHONE ENCOUNTER
Pt is scheduled for Tysabri on 10/15/24.   Last infusion 9/17/24, infusions every 4 weeks. Confirmed scheduling is correct.      Last CBC/CMP 7/23/24  Last JCV 7/23/24 0.17 (negative)  Last MRI brain 7/7/24     Okay to proceed with infusion?     Tysabri is approved with [employer] from 2/23/24 to 2/23/25 at [facility]. Auth # [id number]. See 6/21/24 encounter. No review needed for site NPI/Tax ID change.     Tysabri TOUCH auth valid until 1/29/25 at [facility].

## 2024-10-15 ENCOUNTER — HOSPITAL ENCOUNTER (OUTPATIENT)
Dept: INFUSION CENTER | Facility: CLINIC | Age: 39
Discharge: HOME | End: 2024-10-15
Payer: COMMERCIAL

## 2024-10-15 ENCOUNTER — APPOINTMENT (OUTPATIENT)
Dept: LAB | Facility: HOSPITAL | Age: 39
End: 2024-10-15
Payer: COMMERCIAL

## 2024-10-15 VITALS
SYSTOLIC BLOOD PRESSURE: 112 MMHG | DIASTOLIC BLOOD PRESSURE: 65 MMHG | RESPIRATION RATE: 18 BRPM | HEART RATE: 78 BPM | TEMPERATURE: 98.4 F

## 2024-10-15 DIAGNOSIS — G35 MS (MULTIPLE SCLEROSIS) (HCC): Primary | ICD-10-CM

## 2024-10-15 DIAGNOSIS — G35 MS (MULTIPLE SCLEROSIS) (HCC): ICD-10-CM

## 2024-10-15 LAB
ALBUMIN SERPL BCG-MCNC: 4.4 G/DL (ref 3.5–5)
ALP SERPL-CCNC: 44 U/L (ref 34–104)
ALT SERPL W P-5'-P-CCNC: 14 U/L (ref 7–52)
ANION GAP SERPL CALCULATED.3IONS-SCNC: 6 MMOL/L (ref 4–13)
AST SERPL W P-5'-P-CCNC: 13 U/L (ref 13–39)
BASOPHILS # BLD AUTO: 0.04 THOUSANDS/ΜL (ref 0–0.1)
BASOPHILS NFR BLD AUTO: 1 % (ref 0–1)
BILIRUB SERPL-MCNC: 0.3 MG/DL (ref 0.2–1)
BUN SERPL-MCNC: 12 MG/DL (ref 5–25)
CALCIUM SERPL-MCNC: 9 MG/DL (ref 8.4–10.2)
CHLORIDE SERPL-SCNC: 104 MMOL/L (ref 96–108)
CO2 SERPL-SCNC: 27 MMOL/L (ref 21–32)
CREAT SERPL-MCNC: 0.7 MG/DL (ref 0.6–1.3)
EOSINOPHIL # BLD AUTO: 0.34 THOUSAND/ΜL (ref 0–0.61)
EOSINOPHIL NFR BLD AUTO: 5 % (ref 0–6)
ERYTHROCYTE [DISTWIDTH] IN BLOOD BY AUTOMATED COUNT: 15.6 % (ref 11.6–15.1)
GFR SERPL CREATININE-BSD FRML MDRD: 108 ML/MIN/1.73SQ M
GLUCOSE P FAST SERPL-MCNC: 93 MG/DL (ref 65–99)
HCT VFR BLD AUTO: 40.4 % (ref 34.8–46.1)
HGB BLD-MCNC: 11.9 G/DL (ref 11.5–15.4)
IMM GRANULOCYTES # BLD AUTO: 0.03 THOUSAND/UL (ref 0–0.2)
IMM GRANULOCYTES NFR BLD AUTO: 0 % (ref 0–2)
LYMPHOCYTES # BLD AUTO: 2.62 THOUSANDS/ΜL (ref 0.6–4.47)
LYMPHOCYTES NFR BLD AUTO: 35 % (ref 14–44)
MCH RBC QN AUTO: 22.3 PG (ref 26.8–34.3)
MCHC RBC AUTO-ENTMCNC: 29.5 G/DL (ref 31.4–37.4)
MCV RBC AUTO: 76 FL (ref 82–98)
MONOCYTES # BLD AUTO: 0.45 THOUSAND/ΜL (ref 0.17–1.22)
MONOCYTES NFR BLD AUTO: 6 % (ref 4–12)
NEUTROPHILS # BLD AUTO: 4.07 THOUSANDS/ΜL (ref 1.85–7.62)
NEUTS SEG NFR BLD AUTO: 53 % (ref 43–75)
NRBC BLD AUTO-RTO: 0 /100 WBCS
PLATELET # BLD AUTO: 279 THOUSANDS/UL (ref 149–390)
PMV BLD AUTO: 10.4 FL (ref 8.9–12.7)
POTASSIUM SERPL-SCNC: 3.9 MMOL/L (ref 3.5–5.3)
PROT SERPL-MCNC: 7.5 G/DL (ref 6.4–8.4)
RBC # BLD AUTO: 5.33 MILLION/UL (ref 3.81–5.12)
SODIUM SERPL-SCNC: 137 MMOL/L (ref 135–147)
WBC # BLD AUTO: 7.55 THOUSAND/UL (ref 4.31–10.16)

## 2024-10-15 PROCEDURE — 80053 COMPREHEN METABOLIC PANEL: CPT

## 2024-10-15 PROCEDURE — 36415 COLL VENOUS BLD VENIPUNCTURE: CPT

## 2024-10-15 PROCEDURE — 85025 COMPLETE CBC W/AUTO DIFF WBC: CPT

## 2024-10-15 PROCEDURE — 96367 TX/PROPH/DG ADDL SEQ IV INF: CPT

## 2024-10-15 PROCEDURE — 96365 THER/PROPH/DIAG IV INF INIT: CPT

## 2024-10-15 RX ORDER — SODIUM CHLORIDE 9 MG/ML
20 INJECTION, SOLUTION INTRAVENOUS ONCE
Status: COMPLETED | OUTPATIENT
Start: 2024-10-15 | End: 2024-10-15

## 2024-10-15 RX ORDER — ACETAMINOPHEN 325 MG/1
650 TABLET ORAL ONCE
Status: COMPLETED | OUTPATIENT
Start: 2024-10-15 | End: 2024-10-15

## 2024-10-15 RX ORDER — ACETAMINOPHEN 325 MG/1
650 TABLET ORAL ONCE
OUTPATIENT
Start: 2024-11-12 | End: 2024-11-12

## 2024-10-15 RX ORDER — SODIUM CHLORIDE 9 MG/ML
20 INJECTION, SOLUTION INTRAVENOUS ONCE
OUTPATIENT
Start: 2024-11-12

## 2024-10-15 RX ADMIN — ACETAMINOPHEN 650 MG: 325 TABLET, FILM COATED ORAL at 15:23

## 2024-10-15 RX ADMIN — DIPHENHYDRAMINE HYDROCHLORIDE 50 MG: 50 INJECTION, SOLUTION INTRAMUSCULAR; INTRAVENOUS at 15:23

## 2024-10-15 RX ADMIN — SODIUM CHLORIDE 20 ML/HR: 0.9 INJECTION, SOLUTION INTRAVENOUS at 15:22

## 2024-10-15 RX ADMIN — NATALIZUMAB 300 MG: 300 INJECTION INTRAVENOUS at 16:10

## 2024-10-15 NOTE — PROGRESS NOTES
Pt here for Tysabri infusion, offering no complaints.  Right forearm IV placed with positive blood return noted.  Tolerated infusion without incident.  Observed for 30 minutes after infusion.  PIV removed.  AVS declined.  Aware of next appt 11/12 @ 3 pm.  Walked out in stable condition.

## 2024-10-23 ENCOUNTER — PROCEDURE VISIT (OUTPATIENT)
Dept: PAIN MEDICINE | Facility: CLINIC | Age: 39
End: 2024-10-23
Payer: COMMERCIAL

## 2024-10-23 ENCOUNTER — TELEPHONE (OUTPATIENT)
Dept: PAIN MEDICINE | Facility: CLINIC | Age: 39
End: 2024-10-23

## 2024-10-23 VITALS
WEIGHT: 137 LBS | BODY MASS INDEX: 25.86 KG/M2 | DIASTOLIC BLOOD PRESSURE: 69 MMHG | HEART RATE: 89 BPM | HEIGHT: 61 IN | SYSTOLIC BLOOD PRESSURE: 110 MMHG

## 2024-10-23 DIAGNOSIS — M79.18 MYOFASCIAL PAIN SYNDROME: ICD-10-CM

## 2024-10-23 PROCEDURE — 20552 NJX 1/MLT TRIGGER POINT 1/2: CPT | Performed by: STUDENT IN AN ORGANIZED HEALTH CARE EDUCATION/TRAINING PROGRAM

## 2024-10-23 RX ORDER — METHYLPREDNISOLONE ACETATE 40 MG/ML
40 INJECTION, SUSPENSION INTRA-ARTICULAR; INTRALESIONAL; INTRAMUSCULAR; SOFT TISSUE ONCE
Status: COMPLETED | OUTPATIENT
Start: 2024-10-23 | End: 2024-10-23

## 2024-10-23 RX ORDER — VALACYCLOVIR HYDROCHLORIDE 1 G/1
1000 TABLET, FILM COATED ORAL
COMMUNITY
Start: 2024-09-18

## 2024-10-23 RX ORDER — METRONIDAZOLE 500 MG/1
500 TABLET ORAL
COMMUNITY
Start: 2024-09-17

## 2024-10-23 RX ORDER — BUPIVACAINE HYDROCHLORIDE 2.5 MG/ML
4 INJECTION, SOLUTION EPIDURAL; INFILTRATION; INTRACAUDAL ONCE
Status: COMPLETED | OUTPATIENT
Start: 2024-10-23 | End: 2024-10-23

## 2024-10-23 RX ADMIN — METHYLPREDNISOLONE ACETATE 40 MG: 40 INJECTION, SUSPENSION INTRA-ARTICULAR; INTRALESIONAL; INTRAMUSCULAR; SOFT TISSUE at 14:55

## 2024-10-23 RX ADMIN — BUPIVACAINE HYDROCHLORIDE 4 ML: 2.5 INJECTION, SOLUTION EPIDURAL; INFILTRATION; INTRACAUDAL at 14:55

## 2024-10-23 NOTE — TELEPHONE ENCOUNTER
Called in Starter pack of Naltrexone to [facility] for patient. D1-10 1mg, D11-20 1.5mg, D21-30 3mg. Pricing shared is $30. Pharmacy will call patient once ready. [facility] phone number [phone]

## 2024-10-23 NOTE — PROGRESS NOTES
" Universal Protocol:  procedure performed by consultantConsent: Verbal consent obtained. Written consent obtained.  Risks and benefits: risks, benefits and alternatives were discussed  Consent given by: patient  Time out: Immediately prior to procedure a \"time out\" was called to verify the correct patient, procedure, equipment, support staff and site/side marked as required.  Timeout called at: 10/23/2024 2:21 PM.  Patient understanding: patient states understanding of the procedure being performed  Patient consent: the patient's understanding of the procedure matches consent given  Procedure consent: procedure consent matches procedure scheduled  Relevant documents: relevant documents present and verified  Test results: test results available and properly labeled  Site marked: the operative site was marked  Radiology Images displayed and confirmed. If images not available, report reviewed: imaging studies available  Required items: required blood products, implants, devices, and special equipment available  Patient identity confirmed: verbally with patient  Supporting Documentation  Indications: pain   Procedure Details  Location(s):  Additional procedure details: PROCEDURE NOTE    PATIENT NAME:  Charisse Bowman    MEDICAL RECORD NUMBER:  [id number]    YOB: 1985    DATE OF PROCEDURE:  10/23/24    PROCEDURE: Trigger point injection x 2 with local anesthetic and steroid in the right trapezius muscle groups.  ATTENDING PHYSICIAN: Marcos Sullivan M.D.  PREPROCEDURE DIAGNOSIS: Myofascial pain with identifiable trigger points.  POSTPROCEDURE DIAGNOSIS: Myofascial pain with identifiable trigger points.  ANESTHESIA: Local  ESTIMATED BLOOD LOSS: Minimal  COMPLICATIONS: None  CONSENT: Today's procedure, its potential benefits as well as its risks and potential side effects were reviewed. Discussed risks of the procedure include bleeding, infection, nerve irritation or damage, reactions to the medications, failure " of the pain to improve and exacerbation of the pain were explained to the patient, who verbalized understanding and who wished to proceed. Informed consent was signed.  DESCRIPTION OF THE PROCEDURE: After informed consent was obtained, the patient was placed in the seated position. 2 trigger points were identified via palpation and marked with a surgical skin marker. The skin was prepped with antiseptic in the usual sterile fashion. Strict aseptic technique was utilized throughout the procedure.  A 25 gauge, 1 ½ inch needle was then advanced into each identified trigger point. An injectate consisting of 4 ml of 0.25% marcaine with 1 mL of 40mg/mL depo-medrol was slowly injected in divided doses after negative aspiration. The needle was removed with tip intact.  The patient tolerated the procedure and hemostasis was maintained. There were no apparent paresthesias or complications. The skin was wiped clean, and a band-aid was placed as appropriate. The patient was monitored for an appropriate period of time following the procedure and remained hemodynamically stable and neurovascularly intact. The patient was ultimately discharged to home with supervision in good condition and instructed to call the office to report the response.

## 2024-10-30 ENCOUNTER — TELEPHONE (OUTPATIENT)
Dept: HEMATOLOGY ONCOLOGY | Facility: CLINIC | Age: 39
End: 2024-10-30

## 2024-10-30 NOTE — TELEPHONE ENCOUNTER
Lmom for Charisse on cell, regarding upcoming appt and lab orders that werent done as of yet. Provided hope line # if pt may need to r/s .Will send my chart msg as pt has been active.

## 2024-11-01 ENCOUNTER — TELEPHONE (OUTPATIENT)
Dept: NEUROLOGY | Facility: CLINIC | Age: 39
End: 2024-11-01

## 2024-11-01 NOTE — TELEPHONE ENCOUNTER
Pt is scheduled for Tysabri on 11/12/24.   Last infusion 10/15/24, infusions every 4 weeks. Confirmed scheduling is correct.      Last CBC/CMP 10/15/24  Last JCV 7/23/24 0.17 (negative)  Last MRI brain 7/7/24     Okay to proceed with infusion?     Tysabri is approved with [employer] from 2/23/24 to 2/23/25 at [facility]. Auth # [id number]. See 6/21/24 encounter. No review needed for site NPI/Tax ID change.     Tysabri TOUCH auth valid until 1/29/25 at [facility].

## 2024-11-03 ENCOUNTER — PATIENT MESSAGE (OUTPATIENT)
Age: 39
End: 2024-11-03

## 2024-11-04 ENCOUNTER — TELEPHONE (OUTPATIENT)
Dept: HEMATOLOGY ONCOLOGY | Facility: CLINIC | Age: 39
End: 2024-11-04

## 2024-11-04 NOTE — PATIENT COMMUNICATION
ANEESH Khan   to Neurology Multiple Sclerosis Team 3      11/4/24  9:04 AM  We can provide a letter with her diagnosis and symptoms but we have not taken her out of work so cannot comment on her ability to return to work. If she needs functional capacity testing for social security she can be referred to Dr. Magana.

## 2024-11-05 ENCOUNTER — OFFICE VISIT (OUTPATIENT)
Age: 39
End: 2024-11-05
Payer: COMMERCIAL

## 2024-11-05 ENCOUNTER — TELEPHONE (OUTPATIENT)
Dept: HEMATOLOGY ONCOLOGY | Facility: CLINIC | Age: 39
End: 2024-11-05

## 2024-11-05 VITALS
OXYGEN SATURATION: 99 % | HEIGHT: 61 IN | DIASTOLIC BLOOD PRESSURE: 84 MMHG | HEART RATE: 70 BPM | WEIGHT: 139.8 LBS | BODY MASS INDEX: 26.39 KG/M2 | SYSTOLIC BLOOD PRESSURE: 112 MMHG

## 2024-11-05 DIAGNOSIS — G35 MS (MULTIPLE SCLEROSIS) (HCC): Primary | ICD-10-CM

## 2024-11-05 DIAGNOSIS — R25.2 MUSCLE CRAMPING: ICD-10-CM

## 2024-11-05 PROCEDURE — 99215 OFFICE O/P EST HI 40 MIN: CPT

## 2024-11-05 RX ORDER — METHOCARBAMOL 750 MG/1
750 TABLET, FILM COATED ORAL EVERY 6 HOURS PRN
Qty: 120 TABLET | Refills: 3 | Status: SHIPPED | OUTPATIENT
Start: 2024-11-05

## 2024-11-05 NOTE — PROGRESS NOTES
Ambulatory Visit  Name: Charisse Bowman      : 1985      MRN: [id number]  Encounter Provider: ANEESH Khan  Encounter Date: 2024   Encounter department: [facility] [place]    Assessment & Plan  MS (multiple sclerosis) ([facility])  Charisse Bowman is a 39 year old female with Multiple Sclerosis maintained on Tysabri 300 mg q 4 weeks. She is tolerating this regimen well with no new or worsening neurologic complaints concerning for disease progression. She continues to be JCV negative, next due for repeat 2025; orders placed today. She is up to date on surveillance MRI imaging of the Brain, Cervical and Thoracic spine, last completed 2024. She is up to date on other surveillance labs. Her neurologic exam is intact and non focal with no weakness, motor, or sensory dysfunction. Due to persistent muscle cramping, she was advised to try methocarbamol qid, she should send a message with an update in 2 weeks. If not improved, then will consider a transition to flexeril or tizanidine although discussed this is more likely to be sedating than methocarbamol. She will continue her current dose of Gabapentin and she was encouraged to continue to attend physical therapy. We discussed Tysabri will continue q4 weeks until 2025 and then we will consider transition to q6 weeks. We discussed in terms of her desire to return to work that I have no neurologic contraindication. However if she requires formal functional capacity evaluation for paperwork to be completed she will have to ask her PCP for assistance, otherwise we can provide a referral to Dr. Magana for his evaluation and assistance in completing forms. She will let us know. She will follow up in 4-5 months; sooner if needed.    Orders:    STRATIFY JCV(TM) AB W/RFX TO INHIBITION ASSAY; Future    methocarbamol (ROBAXIN) 750 mg tablet; Take 1 tablet (750 mg total) by mouth every 6 (six) hours as needed for muscle spasms    Muscle  cramping  Charisse continues with significant cramping of her hands and right knee down to her foot. She is using methocarbamol 750 mg twice a day and Gabapentin 100 mg bid and 600 mg qhs with only partial relief. She has failed Baclofen in the past.    We will plan to have her increase Methocarbamol 750 mg up to 4 times daily. She should continue on Gabapentin but may consider an increase in dose as tolerated or a transition to Lyrica in the future if needed. We may also consider cyclobenzaprine or tizanidine in the future as options, however discussed these are likely to be more sedating.She was advised to send me a message on [employer] with an update in a few weeks.     Orders:    methocarbamol (ROBAXIN) 750 mg tablet; Take 1 tablet (750 mg total) by mouth every 6 (six) hours as needed for muscle spasms      Patient Instructions   - Continue Tysabri q 4 weeks until 8/2024 then will change to q 6 weeks  - JCV ab (lab) in late January 2025  - Continue with physical therapy   - Increase methocarbamol to 4 times daily  - Follow up in 4-5 months in [place] or telemedicine        History of Present Illness     HPI Charisse Bowman is a 39 y.o. female  known to the practice for Multiple Sclerosis. She was last seen 6/18/2024 via telemedicine. She is maintained on Tysabri q 4 weeks initiated 7/31/2023. Last infusion was 10/15/2024. She denies any infusion reactions and has tolerated her infusions well with no adverse effects.     Labs   7/23/2024 JCV 0.17 negative ab  10/15/2024 CBC and differential stable, absolute lymphocytes 2.62  10/15/2024 CMP within normal limites      MRI Thoracic spine wwo 7/13/24  1. Stable patchy cord signal abnormality at T8-T9, most consistent with demyelinating disease. No evidence of active inflammation/demyelination.  2. Partially evaluated multilevel cervical spine posterior disc abnormalities, notably at C6-C7 where a posterior disc bulge with superimposed central disc herniation and annular  fissure result in at least moderate central spinal canal stenosis,   appearing to be slightly increased from MRI cervical spine 5/28/2023. This could be further evaluated with dedicated MRI of the cervical spine, as clinically appropriate.    MRI Cervical spine wwo 7/7/2024  1. Stable cervical cord lesions in keeping with demyelinating disease. No evidence of active demyelination/inflammation.  2. Multilevel spondylotic changes of the cervical spine, most notably moderate central spinal canal stenosis with ventral indentation of the thecal sac at C6-C7.  3. Nonspecific cystic left hemimandibular lesion, possibly periapical lucency. Correlate with dental examination.  4. Thyromegaly.    MRI Brain wwo 7/7/2024   Distribution and configuration of white matter lesions described above are nonspecific but consistent with demyelinating plaques of multiple sclerosis.  No active demyelination evident.   Interval normalization of previously noted gadolinium enhancing plaques. No other interval change evident compared to the prior.    She is now following with pain management.   She is taking methocarbamol 750 mg twice daily, she has not tried to increase this further; this was changed from Baclofen after she did not find Baclofen to be effective.   Continues on Gabapentin 200 mg bid and 1200 mg qhs   She denies any new numbness, tingling, weakness, dizziness, headaches, vision changes, or changes in bowel or bladder.   She denies any recent infections, UTIs, URIs, etc.   She is not vaccinated against the flu or covid-19 yet this season.  Unfortunately she does continue with significant cramping of her hands and right knee down to her foot.   She continues with cramping and tingling in her hands and feet.   She also reports her right leg continues to be weaker than her left leg. She is still ambulating with no assistive devices.   She is also now following with Hematology and was receiving iron infusions. She is due for repeat  labs.  She is attending physical therapy twice a week for chronic neck pain, also previously for right patellofemoral syndrome (followed by Ortho).  She denies any new acute focal symptoms to suggest MS relapse.   She is under a lot of stress financially- she is hoping to return back to work. She is also dealing with the reportedly suspicious death of her father.      Review of Systems   Constitutional:  Negative for appetite change, fatigue and fever.   HENT: Negative.  Negative for hearing loss, tinnitus, trouble swallowing and voice change.    Eyes: Negative.  Negative for photophobia, pain and visual disturbance.   Respiratory: Negative.  Negative for shortness of breath.    Cardiovascular: Negative.  Negative for palpitations.   Gastrointestinal: Negative.  Negative for nausea and vomiting.   Endocrine: Negative.  Negative for cold intolerance.   Genitourinary: Negative.  Negative for dysuria, frequency and urgency.   Musculoskeletal:  Negative for back pain, gait problem, myalgias, neck pain and neck stiffness.        Muscle spasms down right leg, right knee pain    Skin: Negative.  Negative for rash.   Allergic/Immunologic: Negative.    Neurological:  Positive for numbness (tingling both hands). Negative for dizziness, tremors, seizures, syncope, facial asymmetry, speech difficulty, weakness, light-headedness and headaches.   Hematological: Negative.  Does not bruise/bleed easily.   Psychiatric/Behavioral: Negative.  Negative for confusion, hallucinations and sleep disturbance.    All other systems reviewed and are negative.    I have personally reviewed the MA's review of systems and made changes as necessary.    Current Outpatient Medications on File Prior to Visit   Medication Sig Dispense Refill    buPROPion (WELLBUTRIN XL) 150 mg 24 hr tablet Take 1 tablet by mouth every morning      fluticasone (FLONASE) 50 mcg/act nasal spray 2 sprays into each nostril daily      gabapentin (NEURONTIN) 100 mg capsule  "take 200 mg bid (in addition to separate rx for 1200 mg qhs) 360 capsule 0    gabapentin (NEURONTIN) 600 MG tablet Take 2 tablets (1,200 mg total) by mouth daily at bedtime 180 tablet 0    lidocaine (LIDODERM) 5 % Apply 1 patch topically daily as needed (R. Knee pain) Remove & Discard patch within 12 hours or as directed by MD 15 patch 0    meloxicam (Mobic) 15 mg tablet Take 1 tablet (15 mg total) by mouth daily With food 45 tablet 2    meloxicam (MOBIC) 7.5 mg tablet Take 7.5 mg by mouth daily      metroNIDAZOLE (FLAGYL) 500 mg tablet 500 mg      valACYclovir (VALTREX) 1,000 mg tablet 1,000 mg       No current facility-administered medications on file prior to visit.      Objective     /84 (BP Location: Right arm, Patient Position: Sitting, Cuff Size: Standard)   Pulse 70   Ht 5' 1\" (1.549 m)   Wt 63.4 kg (139 lb 12.8 oz)   SpO2 99%   BMI 26.41 kg/m²     Neurologic Exam  On neurological examination patient is alert, awake, oriented and in no distress. Speech is fluent without dysarthria or aphasia. Cranial nerves 2-12 were symmetrically intact bilaterally. No evidence of any focal weakness or sensory loss in the upper or lower extremities. Motor testing reveals 5/5 strength of the bilateral upper and lower extremities.There was no pronator drift.  No fasciculations present. No abnormal involuntary movements. Finger- to-nose reveals no tremor or ataxia and intact proprioceptive function, no dysmetria was noted. Rapid alternating movement normal. Sensation was intact to vibration, light touch, and temperature in bilateral upper and lower extremities. Deep tendon reflexes were 2+ and symmetric in the bilateral upper and lower extremities. She is able to rise easily without assistance from a seated position. Casual gait is normal including stance, stride, and arm swing. Normal tandem gait. Romberg is absent. She ambulates unassisted.       Administrative Statements     I have spent a total time of 40 " minutes in caring for this patient on the day of the visit/encounter including Diagnostic results, Prognosis, Risks and benefits of tx options, Instructions for management, Patient and family education, Importance of tx compliance, Risk factor reductions, Impressions, Documenting in the medical record, Reviewing / ordering tests, medicine, procedures  , and Obtaining or reviewing history  .

## 2024-11-05 NOTE — PATIENT INSTRUCTIONS
- Continue Tysabri q 4 weeks until 8/2025 then will change to q 6 weeks  - JCV ab (lab) in late January 2025  - Continue with physical therapy   - Increase methocarbamol to 4 times daily  - Follow up in 4-5 months in [place] or telemedicine

## 2024-11-05 NOTE — TELEPHONE ENCOUNTER
Called Charisse and kirstie about incomplete labs and seeing If she would be able to make it to the lab before the appt tomorrow. Sent my chart msg as pt has been active lately.

## 2024-11-05 NOTE — ASSESSMENT & PLAN NOTE
Orders:    STRATIFY JCV(TM) AB W/RFX TO INHIBITION ASSAY; Future    methocarbamol (ROBAXIN) 750 mg tablet; Take 1 tablet (750 mg total) by mouth every 6 (six) hours as needed for muscle spasms

## 2024-11-06 ENCOUNTER — TELEPHONE (OUTPATIENT)
Dept: HEMATOLOGY ONCOLOGY | Facility: CLINIC | Age: 39
End: 2024-11-06

## 2024-11-07 DIAGNOSIS — M79.18 MYOFASCIAL PAIN SYNDROME: Primary | ICD-10-CM

## 2024-11-07 NOTE — ASSESSMENT & PLAN NOTE
Charisse continues with significant cramping of her hands and right knee down to her foot. She is using methocarbamol 750 mg twice a day and Gabapentin 100 mg bid and 600 mg qhs with only partial relief. She has failed Baclofen in the past.    We will plan to have her increase Methocarbamol 750 mg up to 4 times daily. She should continue on Gabapentin but may consider an increase in dose as tolerated or a transition to Lyrica in the future if needed. We may also consider cyclobenzaprine or tizanidine in the future as options, however discussed these are likely to be more sedating.She was advised to send me a message on [employer] with an update in a few weeks.     Orders:    methocarbamol (ROBAXIN) 750 mg tablet; Take 1 tablet (750 mg total) by mouth every 6 (six) hours as needed for muscle spasms

## 2024-11-07 NOTE — ASSESSMENT & PLAN NOTE
Charisse Bowman is a 39 year old female with Multiple Sclerosis maintained on Tysabri 300 mg q 4 weeks. She is tolerating this regimen well with no new or worsening neurologic complaints concerning for disease progression. She continues to be JCV negative, next due for repeat Jan 2025; orders placed today. She is up to date on surveillance MRI imaging of the Brain, Cervical and Thoracic spine, last completed 7/2024. She is up to date on other surveillance labs. Her neurologic exam is intact and non focal with no weakness, motor, or sensory dysfunction. Due to persistent muscle cramping, she was advised to try methocarbamol qid, she should send a message with an update in 2 weeks. If not improved, then will consider a transition to flexeril or tizanidine although discussed this is more likely to be sedating than methocarbamol. She will continue her current dose of Gabapentin and she was encouraged to continue to attend physical therapy. We discussed Tysabri will continue q4 weeks until 8/2025 and then we will consider transition to q6 weeks. We discussed in terms of her desire to return to work that I have no neurologic contraindication. However if she requires formal functional capacity evaluation for paperwork to be completed she will have to ask her PCP for assistance, otherwise we can provide a referral to Dr. Magana for his evaluation and assistance in completing forms. She will let us know. She will follow up in 4-5 months; sooner if needed.    Orders:    STRATIFY JCV(TM) AB W/RFX TO INHIBITION ASSAY; Future    methocarbamol (ROBAXIN) 750 mg tablet; Take 1 tablet (750 mg total) by mouth every 6 (six) hours as needed for muscle spasms

## 2024-11-13 ENCOUNTER — HOSPITAL ENCOUNTER (OUTPATIENT)
Dept: INFUSION CENTER | Facility: CLINIC | Age: 39
Discharge: HOME | End: 2024-11-13
Payer: COMMERCIAL

## 2024-11-13 ENCOUNTER — TELEPHONE (OUTPATIENT)
Dept: INFUSION CENTER | Facility: CLINIC | Age: 39
End: 2024-11-13

## 2024-11-13 VITALS
TEMPERATURE: 97.1 F | RESPIRATION RATE: 18 BRPM | SYSTOLIC BLOOD PRESSURE: 120 MMHG | DIASTOLIC BLOOD PRESSURE: 67 MMHG | HEART RATE: 69 BPM

## 2024-11-13 DIAGNOSIS — G35 MS (MULTIPLE SCLEROSIS) (HCC): Primary | ICD-10-CM

## 2024-11-13 RX ORDER — SODIUM CHLORIDE 9 MG/ML
20 INJECTION, SOLUTION INTRAVENOUS ONCE
OUTPATIENT
Start: 2024-12-10

## 2024-11-13 RX ORDER — ACETAMINOPHEN 325 MG/1
650 TABLET ORAL ONCE
OUTPATIENT
Start: 2024-12-10 | End: 2024-12-10

## 2024-11-13 RX ORDER — ACETAMINOPHEN 325 MG/1
650 TABLET ORAL ONCE
Status: COMPLETED | OUTPATIENT
Start: 2024-11-13 | End: 2024-11-13

## 2024-11-13 RX ORDER — SODIUM CHLORIDE 9 MG/ML
20 INJECTION, SOLUTION INTRAVENOUS ONCE
Status: COMPLETED | OUTPATIENT
Start: 2024-11-13 | End: 2024-11-13

## 2024-11-13 RX ADMIN — DIPHENHYDRAMINE HYDROCHLORIDE 50 MG: 50 INJECTION, SOLUTION INTRAMUSCULAR; INTRAVENOUS at 12:28

## 2024-11-13 RX ADMIN — NATALIZUMAB 300 MG: 300 INJECTION INTRAVENOUS at 12:58

## 2024-11-13 RX ADMIN — ACETAMINOPHEN 650 MG: 325 TABLET, FILM COATED ORAL at 12:18

## 2024-11-13 RX ADMIN — SODIUM CHLORIDE 20 ML/HR: 9 INJECTION, SOLUTION INTRAVENOUS at 12:17

## 2024-11-13 NOTE — PROGRESS NOTES
Patient arrives to infusion center for IV Tysabri infusion. Patient offers ongoing complaints of b/l hand pain and cramping. PIV established, patient tolerated entire infusion without complication. Observation period surpassed without incident. PIV removed, AVS offered.     Next appointment: 12/10/24 @ 1500

## 2024-11-13 NOTE — TELEPHONE ENCOUNTER
Patient has 2 no shows within a 6 month period.  Patient no showed on 8/7/24 and 11/12/24.  Infusion techs please contact patient and review the no show policy as well as her next appt.

## 2024-11-27 ENCOUNTER — APPOINTMENT (OUTPATIENT)
Dept: LAB | Facility: CLINIC | Age: 39
End: 2024-11-27
Payer: COMMERCIAL

## 2024-11-27 ENCOUNTER — RESULTS FOLLOW-UP (OUTPATIENT)
Dept: OTHER | Facility: HOSPITAL | Age: 39
End: 2024-11-27

## 2024-11-27 DIAGNOSIS — Z11.4 SCREENING FOR HIV (HUMAN IMMUNODEFICIENCY VIRUS): ICD-10-CM

## 2024-11-27 DIAGNOSIS — G35 MS (MULTIPLE SCLEROSIS) (HCC): ICD-10-CM

## 2024-11-27 DIAGNOSIS — D50.0 IRON DEFICIENCY ANEMIA DUE TO CHRONIC BLOOD LOSS: ICD-10-CM

## 2024-11-27 LAB
ALBUMIN SERPL BCG-MCNC: 4.2 G/DL (ref 3.5–5)
ALP SERPL-CCNC: 35 U/L (ref 34–104)
ALT SERPL W P-5'-P-CCNC: 11 U/L (ref 7–52)
ANION GAP SERPL CALCULATED.3IONS-SCNC: 9 MMOL/L (ref 4–13)
AST SERPL W P-5'-P-CCNC: 9 U/L (ref 13–39)
BASOPHILS # BLD AUTO: 0.05 THOUSANDS/ΜL (ref 0–0.1)
BASOPHILS NFR BLD AUTO: 1 % (ref 0–1)
BILIRUB SERPL-MCNC: 0.33 MG/DL (ref 0.2–1)
BUN SERPL-MCNC: 12 MG/DL (ref 5–25)
CALCIUM SERPL-MCNC: 8.7 MG/DL (ref 8.4–10.2)
CHLORIDE SERPL-SCNC: 106 MMOL/L (ref 96–108)
CO2 SERPL-SCNC: 26 MMOL/L (ref 21–32)
CREAT SERPL-MCNC: 0.69 MG/DL (ref 0.6–1.3)
EOSINOPHIL # BLD AUTO: 0.28 THOUSAND/ΜL (ref 0–0.61)
EOSINOPHIL NFR BLD AUTO: 4 % (ref 0–6)
ERYTHROCYTE [DISTWIDTH] IN BLOOD BY AUTOMATED COUNT: 15.9 % (ref 11.6–15.1)
FERRITIN SERPL-MCNC: 46 NG/ML (ref 11–307)
GFR SERPL CREATININE-BSD FRML MDRD: 109 ML/MIN/1.73SQ M
GLUCOSE P FAST SERPL-MCNC: 96 MG/DL (ref 65–99)
HCT VFR BLD AUTO: 41.1 % (ref 34.8–46.1)
HGB BLD-MCNC: 12.4 G/DL (ref 11.5–15.4)
HIV 1+2 AB+HIV1 P24 AG SERPL QL IA: NORMAL
HIV 2 AB SERPL QL IA: NORMAL
HIV1 AB SERPL QL IA: NORMAL
HIV1 P24 AG SERPL QL IA: NORMAL
IMM GRANULOCYTES # BLD AUTO: 0.04 THOUSAND/UL (ref 0–0.2)
IMM GRANULOCYTES NFR BLD AUTO: 1 % (ref 0–2)
IRON SATN MFR SERPL: 28 % (ref 15–50)
IRON SERPL-MCNC: 80 UG/DL (ref 50–212)
LYMPHOCYTES # BLD AUTO: 3.44 THOUSANDS/ΜL (ref 0.6–4.47)
LYMPHOCYTES NFR BLD AUTO: 44 % (ref 14–44)
MCH RBC QN AUTO: 22.5 PG (ref 26.8–34.3)
MCHC RBC AUTO-ENTMCNC: 30.2 G/DL (ref 31.4–37.4)
MCV RBC AUTO: 75 FL (ref 82–98)
MONOCYTES # BLD AUTO: 0.5 THOUSAND/ΜL (ref 0.17–1.22)
MONOCYTES NFR BLD AUTO: 6 % (ref 4–12)
NEUTROPHILS # BLD AUTO: 3.51 THOUSANDS/ΜL (ref 1.85–7.62)
NEUTS SEG NFR BLD AUTO: 44 % (ref 43–75)
NRBC BLD AUTO-RTO: 0 /100 WBCS
PLATELET # BLD AUTO: 239 THOUSANDS/UL (ref 149–390)
PMV BLD AUTO: 10.5 FL (ref 8.9–12.7)
POTASSIUM SERPL-SCNC: 4 MMOL/L (ref 3.5–5.3)
PROT SERPL-MCNC: 6.7 G/DL (ref 6.4–8.4)
RBC # BLD AUTO: 5.5 MILLION/UL (ref 3.81–5.12)
SODIUM SERPL-SCNC: 141 MMOL/L (ref 135–147)
TIBC SERPL-MCNC: 290 UG/DL (ref 250–450)
UIBC SERPL-MCNC: 210 UG/DL (ref 155–355)
WBC # BLD AUTO: 7.82 THOUSAND/UL (ref 4.31–10.16)

## 2024-11-27 PROCEDURE — 83550 IRON BINDING TEST: CPT

## 2024-11-27 PROCEDURE — 82728 ASSAY OF FERRITIN: CPT

## 2024-11-27 PROCEDURE — 85025 COMPLETE CBC W/AUTO DIFF WBC: CPT

## 2024-11-27 PROCEDURE — 80053 COMPREHEN METABOLIC PANEL: CPT

## 2024-11-27 PROCEDURE — 87389 HIV-1 AG W/HIV-1&-2 AB AG IA: CPT

## 2024-11-27 PROCEDURE — 83540 ASSAY OF IRON: CPT

## 2024-11-27 PROCEDURE — 36415 COLL VENOUS BLD VENIPUNCTURE: CPT

## 2024-11-29 ENCOUNTER — TELEPHONE (OUTPATIENT)
Dept: NEUROLOGY | Facility: CLINIC | Age: 39
End: 2024-11-29

## 2024-11-29 NOTE — TELEPHONE ENCOUNTER
Pt is scheduled for Tysabri on 12/10/24.   Last infusion 11/13/24, infusions every 4 weeks. Pt is scheduled 1 day early (27 days apart). Needs reschedule.     Called [facility] infusion center and spoke with Miroslava. Rescheduled pt's infusion for 12/13/24 @ 8:30am.     Called and spoke with pt. She requires later appt. Called infusion center with pt on the line. Rescheduled to 12/13/24 at 10am.      Last CBC/CMP 11/27/24  Last JCV 7/23/24 0.17 (negative)  Last MRI brain 7/7/24     Okay to proceed with infusion?     Tysabri is approved with P from 2/23/24 to 2/23/25 at [facility]. Auth # [id number]. See 6/21/24 encounter. No review needed for site NPI/Tax ID change.     Tysabri TOUCH auth valid until 1/29/25 at [facility].

## 2024-12-10 ENCOUNTER — DOCUMENTATION (OUTPATIENT)
Dept: PAIN MEDICINE | Facility: CLINIC | Age: 39
End: 2024-12-10

## 2024-12-13 ENCOUNTER — HOSPITAL ENCOUNTER (OUTPATIENT)
Dept: INFUSION CENTER | Facility: CLINIC | Age: 39
Discharge: HOME | End: 2024-12-13

## 2024-12-16 DIAGNOSIS — G35 MS (MULTIPLE SCLEROSIS) (HCC): Primary | ICD-10-CM

## 2024-12-16 RX ORDER — ACETAMINOPHEN 325 MG/1
650 TABLET ORAL ONCE
Status: CANCELLED | OUTPATIENT
Start: 2024-12-17 | End: 2024-12-17

## 2024-12-16 RX ORDER — SODIUM CHLORIDE 9 MG/ML
20 INJECTION, SOLUTION INTRAVENOUS ONCE
Status: CANCELLED | OUTPATIENT
Start: 2024-12-17

## 2024-12-17 ENCOUNTER — HOSPITAL ENCOUNTER (OUTPATIENT)
Dept: INFUSION CENTER | Facility: CLINIC | Age: 39
Discharge: HOME | End: 2024-12-17
Payer: COMMERCIAL

## 2024-12-17 VITALS — TEMPERATURE: 98 F | HEART RATE: 81 BPM | DIASTOLIC BLOOD PRESSURE: 67 MMHG | SYSTOLIC BLOOD PRESSURE: 113 MMHG

## 2024-12-17 DIAGNOSIS — G35 MS (MULTIPLE SCLEROSIS) (HCC): Primary | ICD-10-CM

## 2024-12-17 PROCEDURE — 96367 TX/PROPH/DG ADDL SEQ IV INF: CPT

## 2024-12-17 PROCEDURE — 96365 THER/PROPH/DIAG IV INF INIT: CPT

## 2024-12-17 RX ORDER — ACETAMINOPHEN 325 MG/1
650 TABLET ORAL ONCE
OUTPATIENT
Start: 2025-01-14 | End: 2025-01-14

## 2024-12-17 RX ORDER — SODIUM CHLORIDE 9 MG/ML
20 INJECTION, SOLUTION INTRAVENOUS ONCE
OUTPATIENT
Start: 2025-01-14

## 2024-12-17 RX ORDER — SODIUM CHLORIDE 9 MG/ML
20 INJECTION, SOLUTION INTRAVENOUS ONCE
Status: COMPLETED | OUTPATIENT
Start: 2024-12-17 | End: 2024-12-17

## 2024-12-17 RX ORDER — ACETAMINOPHEN 325 MG/1
650 TABLET ORAL ONCE
Status: COMPLETED | OUTPATIENT
Start: 2024-12-17 | End: 2024-12-17

## 2024-12-17 RX ADMIN — SODIUM CHLORIDE 20 ML/HR: 0.9 INJECTION, SOLUTION INTRAVENOUS at 13:58

## 2024-12-17 RX ADMIN — NATALIZUMAB 300 MG: 300 INJECTION INTRAVENOUS at 14:40

## 2024-12-17 RX ADMIN — DIPHENHYDRAMINE HYDROCHLORIDE 50 MG: 50 INJECTION, SOLUTION INTRAMUSCULAR; INTRAVENOUS at 13:57

## 2024-12-17 RX ADMIN — ACETAMINOPHEN 650 MG: 325 TABLET, FILM COATED ORAL at 13:58

## 2024-12-17 NOTE — PROGRESS NOTES
Pt here for Tysabri, offering no complaints. Pre-infusion checklist questions answered and submitted online. PIV established with positive blood return. Pt tolerated entire infusion without complications. PIV flushed and removed. AVS given. Next appt 1/14/25 1pm. Walked out in stable condition.

## 2025-01-03 ENCOUNTER — TELEPHONE (OUTPATIENT)
Dept: NEUROLOGY | Facility: CLINIC | Age: 40
End: 2025-01-03

## 2025-01-03 DIAGNOSIS — G35 MS (MULTIPLE SCLEROSIS) (HCC): Primary | ICD-10-CM

## 2025-01-03 NOTE — TELEPHONE ENCOUNTER
Pt is scheduled for Tysabri on 1/14/25.   Last infusion 12/17/24, infusions every 4 weeks. Confirmed scheduling is correct.     Last CBC/CMP 11/27/24  Last JCV 7/23/24 0.17 (negative)  Last MRI brain 7/7/24    Pt is due for updated JCV testing by 1/23/25. Order entered. Intrinsity message sent.     Okay to proceed with infusion?     Tysabri is approved with P from 2/23/24 to 2/23/25 at [facility]. Auth # [id number]. See 6/21/24 encounter. No review needed for site NPI/Tax ID change.     Tysabri TOUCH auth valid until 1/29/25 at [facility].

## 2025-01-09 NOTE — TELEPHONE ENCOUNTER
Received via [email or web address] from [email or web address] [facility] Patient Status Report and Reauthorization Questionnaire.  Form scanned into [employer].

## 2025-01-10 NOTE — TELEPHONE ENCOUNTER
Received a call from Della with [employer]. She stated that pt's reauthorization questionnaire for Tysabri is expiring 1/29/25. This needs to be completed and returned prior to the expiration date. If the office does not have a questionnaire, or if there is a delay, Della would like a return call at #[phone], option #3.

## 2025-01-14 ENCOUNTER — HOSPITAL ENCOUNTER (OUTPATIENT)
Dept: INFUSION CENTER | Facility: CLINIC | Age: 40
End: 2025-01-14

## 2025-01-17 ENCOUNTER — TELEPHONE (OUTPATIENT)
Dept: INFUSION CENTER | Facility: CLINIC | Age: 40
End: 2025-01-17

## 2025-01-17 NOTE — TELEPHONE ENCOUNTER
Patient has 3 no shows within a 6 month period.  Patient no showed on 8/7/24, 11/12/24, and 1/16/25.  Infusion techs please contact the patient and review the no show policy as well as her next scheduled appt.  If she no shows a 4th time within a 6 month period she will be discharged from infusion per the Infusion center no show policy.

## 2025-01-22 DIAGNOSIS — G35 MS (MULTIPLE SCLEROSIS) (HCC): ICD-10-CM

## 2025-01-22 RX ORDER — GABAPENTIN 600 MG/1
1200 TABLET ORAL
Qty: 180 TABLET | Refills: 0 | Status: SHIPPED | OUTPATIENT
Start: 2025-01-22

## 2025-01-30 ENCOUNTER — DOCUMENTATION (OUTPATIENT)
Dept: PAIN MEDICINE | Facility: CLINIC | Age: 40
End: 2025-01-30

## 2025-02-06 DIAGNOSIS — G35 MS (MULTIPLE SCLEROSIS) (HCC): Primary | ICD-10-CM

## 2025-02-06 RX ORDER — ACETAMINOPHEN 325 MG/1
650 TABLET ORAL ONCE
Status: CANCELLED | OUTPATIENT
Start: 2025-02-11 | End: 2025-02-06

## 2025-02-06 RX ORDER — SODIUM CHLORIDE 9 MG/ML
20 INJECTION, SOLUTION INTRAVENOUS ONCE
Status: CANCELLED | OUTPATIENT
Start: 2025-02-11

## 2025-02-11 ENCOUNTER — APPOINTMENT (OUTPATIENT)
Dept: LAB | Facility: HOSPITAL | Age: 40
End: 2025-02-11
Payer: COMMERCIAL

## 2025-02-11 ENCOUNTER — HOSPITAL ENCOUNTER (OUTPATIENT)
Dept: INFUSION CENTER | Facility: CLINIC | Age: 40
Discharge: HOME | End: 2025-02-11
Payer: COMMERCIAL

## 2025-02-11 VITALS
TEMPERATURE: 97.8 F | HEART RATE: 66 BPM | DIASTOLIC BLOOD PRESSURE: 54 MMHG | RESPIRATION RATE: 18 BRPM | SYSTOLIC BLOOD PRESSURE: 111 MMHG

## 2025-02-11 DIAGNOSIS — R71.8 MICROCYTOSIS: Primary | ICD-10-CM

## 2025-02-11 DIAGNOSIS — G35 MS (MULTIPLE SCLEROSIS) (HCC): ICD-10-CM

## 2025-02-11 DIAGNOSIS — G35 MS (MULTIPLE SCLEROSIS) (HCC): Primary | ICD-10-CM

## 2025-02-11 LAB
ALBUMIN SERPL BCG-MCNC: 4.2 G/DL (ref 3.5–5)
ALP SERPL-CCNC: 36 U/L (ref 34–104)
ALT SERPL W P-5'-P-CCNC: 10 U/L (ref 7–52)
ANION GAP SERPL CALCULATED.3IONS-SCNC: 5 MMOL/L (ref 4–13)
AST SERPL W P-5'-P-CCNC: 10 U/L (ref 13–39)
BASOPHILS # BLD AUTO: 0.03 THOUSANDS/ΜL (ref 0–0.1)
BASOPHILS NFR BLD AUTO: 1 % (ref 0–1)
BILIRUB SERPL-MCNC: 0.58 MG/DL (ref 0.2–1)
BUN SERPL-MCNC: 12 MG/DL (ref 5–25)
CALCIUM SERPL-MCNC: 8.8 MG/DL (ref 8.4–10.2)
CHLORIDE SERPL-SCNC: 104 MMOL/L (ref 96–108)
CO2 SERPL-SCNC: 27 MMOL/L (ref 21–32)
CREAT SERPL-MCNC: 0.71 MG/DL (ref 0.6–1.3)
EOSINOPHIL # BLD AUTO: 0.23 THOUSAND/ΜL (ref 0–0.61)
EOSINOPHIL NFR BLD AUTO: 4 % (ref 0–6)
ERYTHROCYTE [DISTWIDTH] IN BLOOD BY AUTOMATED COUNT: 15 % (ref 11.6–15.1)
GFR SERPL CREATININE-BSD FRML MDRD: 106 ML/MIN/1.73SQ M
GLUCOSE P FAST SERPL-MCNC: 89 MG/DL (ref 65–99)
HCT VFR BLD AUTO: 38 % (ref 34.8–46.1)
HGB BLD-MCNC: 11 G/DL (ref 11.5–15.4)
IMM GRANULOCYTES # BLD AUTO: 0.01 THOUSAND/UL (ref 0–0.2)
IMM GRANULOCYTES NFR BLD AUTO: 0 % (ref 0–2)
LYMPHOCYTES # BLD AUTO: 2.66 THOUSANDS/ΜL (ref 0.6–4.47)
LYMPHOCYTES NFR BLD AUTO: 40 % (ref 14–44)
MCH RBC QN AUTO: 21.3 PG (ref 26.8–34.3)
MCHC RBC AUTO-ENTMCNC: 28.9 G/DL (ref 31.4–37.4)
MCV RBC AUTO: 74 FL (ref 82–98)
MONOCYTES # BLD AUTO: 0.43 THOUSAND/ΜL (ref 0.17–1.22)
MONOCYTES NFR BLD AUTO: 7 % (ref 4–12)
NEUTROPHILS # BLD AUTO: 3.22 THOUSANDS/ΜL (ref 1.85–7.62)
NEUTS SEG NFR BLD AUTO: 48 % (ref 43–75)
NRBC BLD AUTO-RTO: 0 /100 WBCS
PLATELET # BLD AUTO: 248 THOUSANDS/UL (ref 149–390)
PMV BLD AUTO: 10 FL (ref 8.9–12.7)
POTASSIUM SERPL-SCNC: 3.8 MMOL/L (ref 3.5–5.3)
PROT SERPL-MCNC: 7 G/DL (ref 6.4–8.4)
RBC # BLD AUTO: 5.17 MILLION/UL (ref 3.81–5.12)
SODIUM SERPL-SCNC: 136 MMOL/L (ref 135–147)
WBC # BLD AUTO: 6.58 THOUSAND/UL (ref 4.31–10.16)

## 2025-02-11 PROCEDURE — 80053 COMPREHEN METABOLIC PANEL: CPT

## 2025-02-11 PROCEDURE — 86711 JOHN CUNNINGHAM ANTIBODY: CPT

## 2025-02-11 PROCEDURE — 85025 COMPLETE CBC W/AUTO DIFF WBC: CPT

## 2025-02-11 PROCEDURE — 36415 COLL VENOUS BLD VENIPUNCTURE: CPT

## 2025-02-11 RX ORDER — ACETAMINOPHEN 325 MG/1
650 TABLET ORAL ONCE
Status: COMPLETED | OUTPATIENT
Start: 2025-02-11 | End: 2025-02-11

## 2025-02-11 RX ORDER — SODIUM CHLORIDE 9 MG/ML
20 INJECTION, SOLUTION INTRAVENOUS ONCE
OUTPATIENT
Start: 2025-03-11

## 2025-02-11 RX ORDER — SODIUM CHLORIDE 9 MG/ML
20 INJECTION, SOLUTION INTRAVENOUS ONCE
Status: COMPLETED | OUTPATIENT
Start: 2025-02-11 | End: 2025-02-11

## 2025-02-11 RX ORDER — ACETAMINOPHEN 325 MG/1
650 TABLET ORAL ONCE
OUTPATIENT
Start: 2025-03-11 | End: 2025-03-11

## 2025-02-11 RX ADMIN — SODIUM CHLORIDE 20 ML/HR: 9 INJECTION, SOLUTION INTRAVENOUS at 13:13

## 2025-02-11 RX ADMIN — ACETAMINOPHEN 650 MG: 325 TABLET, FILM COATED ORAL at 13:13

## 2025-02-11 RX ADMIN — NATALIZUMAB 300 MG: 300 INJECTION INTRAVENOUS at 13:51

## 2025-02-11 RX ADMIN — DIPHENHYDRAMINE HYDROCHLORIDE 50 MG: 50 INJECTION, SOLUTION INTRAMUSCULAR; INTRAVENOUS at 13:14

## 2025-02-11 NOTE — PROGRESS NOTES
Patient to clinic for Tysabri. Denies recent illness and infections. Pre-infusion checklist completed.  Tolerated infusion without complications. PIV removed.  Aware of next appointment 3/11/25 at 2:30 pm. Declines 1hr post infusion observation.Patient instructed to go to the ER with any signs of reaction. AVS provided.

## 2025-02-13 ENCOUNTER — TELEPHONE (OUTPATIENT)
Dept: NEUROLOGY | Facility: CLINIC | Age: 40
End: 2025-02-13

## 2025-02-14 NOTE — TELEPHONE ENCOUNTER
Next Tysabri infusion is scheduled 3/11/25. New PA is needed. Faxed with clinicals. Awaiting determination.

## 2025-02-18 NOTE — TELEPHONE ENCOUNTER
[facility]ri is [facility] with [facility] from 2/14/25 to 2/14/26 at [facility]. Auth # [id number].

## 2025-02-19 LAB
GALACTOMANNAN AG SERPL IA-ACNC: 0.27
JCPYV AB SERPL QL IA: NORMAL
JCPYV AB SERPL QL IA: NORMAL
SL AMB JCV AB BY INHIBITION: NEGATIVE
SPECIMEN STATUS: NORMAL

## 2025-02-20 ENCOUNTER — DOCUMENTATION (OUTPATIENT)
Dept: PAIN MEDICINE | Facility: CLINIC | Age: 40
End: 2025-02-20

## 2025-02-25 LAB — HBA1 GENE MUT TESTED BLD/T: NORMAL

## 2025-02-26 ENCOUNTER — RESULTS FOLLOW-UP (OUTPATIENT)
Dept: HEMATOLOGY ONCOLOGY | Facility: CLINIC | Age: 40
End: 2025-02-26

## 2025-02-28 ENCOUNTER — TELEPHONE (OUTPATIENT)
Dept: NEUROLOGY | Facility: CLINIC | Age: 40
End: 2025-02-28

## 2025-02-28 NOTE — TELEPHONE ENCOUNTER
Pt is scheduled for Tysabri on 3/11/25.   Last infusion 2/11/25, infusions every 4 weeks. Confirmed scheduling is correct.      Last CBC/CMP 2/11/25  Last JCV 2/11/25 0.27 (final result negative)  Last MRI brain 7/7/24     Okay to proceed with infusion?     Tysabri is approved with [employer] from 2/14/25 to 2/14/26 at [facility]) infusion center. Auth # [id number].     Tysabri TOUCH auth valid until 7/31/25 at [facility] infusion center.

## 2025-03-04 ENCOUNTER — TELEPHONE (OUTPATIENT)
Dept: NEUROLOGY | Facility: CLINIC | Age: 40
End: 2025-03-04

## 2025-03-04 NOTE — TELEPHONE ENCOUNTER
Spoke with patient to confirm patient's appointment on 3/11  with Damián.  Confirmed pt has access to a working microphone and camera.

## 2025-03-06 ENCOUNTER — DOCUMENTATION (OUTPATIENT)
Dept: PAIN MEDICINE | Facility: CLINIC | Age: 40
End: 2025-03-06

## 2025-03-08 DIAGNOSIS — R25.2 MUSCLE CRAMPING: ICD-10-CM

## 2025-03-08 DIAGNOSIS — G35 MS (MULTIPLE SCLEROSIS) (HCC): ICD-10-CM

## 2025-03-10 RX ORDER — METHOCARBAMOL 750 MG/1
750 TABLET, FILM COATED ORAL EVERY 6 HOURS PRN
Qty: 120 TABLET | Refills: 0 | Status: SHIPPED | OUTPATIENT
Start: 2025-03-10

## 2025-03-11 ENCOUNTER — RESULTS FOLLOW-UP (OUTPATIENT)
Dept: NEUROLOGY | Facility: CLINIC | Age: 40
End: 2025-03-11

## 2025-03-11 ENCOUNTER — TELEPHONE (OUTPATIENT)
Dept: INFUSION CENTER | Facility: CLINIC | Age: 40
End: 2025-03-11

## 2025-03-11 ENCOUNTER — APPOINTMENT (OUTPATIENT)
Dept: LAB | Facility: HOSPITAL | Age: 40
End: 2025-03-11
Payer: COMMERCIAL

## 2025-03-11 ENCOUNTER — HOSPITAL ENCOUNTER (OUTPATIENT)
Dept: INFUSION CENTER | Facility: CLINIC | Age: 40
Discharge: HOME | End: 2025-03-11
Payer: COMMERCIAL

## 2025-03-11 VITALS
RESPIRATION RATE: 18 BRPM | HEART RATE: 71 BPM | TEMPERATURE: 97.2 F | DIASTOLIC BLOOD PRESSURE: 58 MMHG | SYSTOLIC BLOOD PRESSURE: 114 MMHG

## 2025-03-11 DIAGNOSIS — G35 MS (MULTIPLE SCLEROSIS) (HCC): ICD-10-CM

## 2025-03-11 DIAGNOSIS — G35 MS (MULTIPLE SCLEROSIS) (HCC): Primary | ICD-10-CM

## 2025-03-11 LAB
ALBUMIN SERPL BCG-MCNC: 4.4 G/DL (ref 3.5–5)
ALP SERPL-CCNC: 38 U/L (ref 34–104)
ALT SERPL W P-5'-P-CCNC: 16 U/L (ref 7–52)
ANION GAP SERPL CALCULATED.3IONS-SCNC: 7 MMOL/L (ref 4–13)
AST SERPL W P-5'-P-CCNC: 11 U/L (ref 13–39)
BASOPHILS # BLD AUTO: 0.04 THOUSANDS/ÂΜL (ref 0–0.1)
BASOPHILS NFR BLD AUTO: 0 % (ref 0–1)
BILIRUB SERPL-MCNC: 0.46 MG/DL (ref 0.2–1)
BUN SERPL-MCNC: 12 MG/DL (ref 5–25)
CALCIUM SERPL-MCNC: 9.4 MG/DL (ref 8.4–10.2)
CHLORIDE SERPL-SCNC: 102 MMOL/L (ref 96–108)
CO2 SERPL-SCNC: 28 MMOL/L (ref 21–32)
CREAT SERPL-MCNC: 0.71 MG/DL (ref 0.6–1.3)
EOSINOPHIL # BLD AUTO: 0.17 THOUSAND/ÂΜL (ref 0–0.61)
EOSINOPHIL NFR BLD AUTO: 2 % (ref 0–6)
ERYTHROCYTE [DISTWIDTH] IN BLOOD BY AUTOMATED COUNT: 15.5 % (ref 11.6–15.1)
GFR SERPL CREATININE-BSD FRML MDRD: 106 ML/MIN/1.73SQ M
GLUCOSE SERPL-MCNC: 83 MG/DL (ref 65–140)
HCT VFR BLD AUTO: 38.7 % (ref 34.8–46.1)
HGB BLD-MCNC: 11.5 G/DL (ref 11.5–15.4)
IMM GRANULOCYTES # BLD AUTO: 0.02 THOUSAND/UL (ref 0–0.2)
IMM GRANULOCYTES NFR BLD AUTO: 0 % (ref 0–2)
LYMPHOCYTES # BLD AUTO: 3.27 THOUSANDS/ÂΜL (ref 0.6–4.47)
LYMPHOCYTES NFR BLD AUTO: 37 % (ref 14–44)
MCH RBC QN AUTO: 21.5 PG (ref 26.8–34.3)
MCHC RBC AUTO-ENTMCNC: 29.7 G/DL (ref 31.4–37.4)
MCV RBC AUTO: 72 FL (ref 82–98)
MONOCYTES # BLD AUTO: 0.52 THOUSAND/ÂΜL (ref 0.17–1.22)
MONOCYTES NFR BLD AUTO: 6 % (ref 4–12)
NEUTROPHILS # BLD AUTO: 4.92 THOUSANDS/ÂΜL (ref 1.85–7.62)
NEUTS SEG NFR BLD AUTO: 55 % (ref 43–75)
NRBC BLD AUTO-RTO: 0 /100 WBCS
PLATELET # BLD AUTO: 273 THOUSANDS/UL (ref 149–390)
PMV BLD AUTO: 11.1 FL (ref 8.9–12.7)
POTASSIUM SERPL-SCNC: 3.8 MMOL/L (ref 3.5–5.3)
PROT SERPL-MCNC: 7.4 G/DL (ref 6.4–8.4)
RBC # BLD AUTO: 5.36 MILLION/UL (ref 3.81–5.12)
SODIUM SERPL-SCNC: 137 MMOL/L (ref 135–147)
WBC # BLD AUTO: 8.94 THOUSAND/UL (ref 4.31–10.16)

## 2025-03-11 PROCEDURE — 96365 THER/PROPH/DIAG IV INF INIT: CPT

## 2025-03-11 PROCEDURE — 36415 COLL VENOUS BLD VENIPUNCTURE: CPT

## 2025-03-11 PROCEDURE — 96367 TX/PROPH/DG ADDL SEQ IV INF: CPT

## 2025-03-11 PROCEDURE — 85025 COMPLETE CBC W/AUTO DIFF WBC: CPT

## 2025-03-11 PROCEDURE — 80053 COMPREHEN METABOLIC PANEL: CPT

## 2025-03-11 RX ORDER — ACETAMINOPHEN 325 MG/1
650 TABLET ORAL ONCE
OUTPATIENT
Start: 2025-04-08 | End: 2025-04-08

## 2025-03-11 RX ORDER — ACETAMINOPHEN 325 MG/1
650 TABLET ORAL ONCE
Status: COMPLETED | OUTPATIENT
Start: 2025-03-11 | End: 2025-03-11

## 2025-03-11 RX ORDER — SODIUM CHLORIDE 9 MG/ML
20 INJECTION, SOLUTION INTRAVENOUS ONCE
OUTPATIENT
Start: 2025-04-08

## 2025-03-11 RX ORDER — SODIUM CHLORIDE 9 MG/ML
20 INJECTION, SOLUTION INTRAVENOUS ONCE
Status: COMPLETED | OUTPATIENT
Start: 2025-03-11 | End: 2025-03-11

## 2025-03-11 RX ADMIN — SODIUM CHLORIDE 20 ML/HR: 0.9 INJECTION, SOLUTION INTRAVENOUS at 14:48

## 2025-03-11 RX ADMIN — DIPHENHYDRAMINE HYDROCHLORIDE 50 MG: 50 INJECTION, SOLUTION INTRAMUSCULAR; INTRAVENOUS at 14:52

## 2025-03-11 RX ADMIN — NATALIZUMAB 300 MG: 300 INJECTION INTRAVENOUS at 15:21

## 2025-03-11 RX ADMIN — ACETAMINOPHEN 650 MG: 325 TABLET, FILM COATED ORAL at 14:47

## 2025-03-11 NOTE — PROGRESS NOTES
Charisse Bowman presents for Tysabri, offers no complaints. Pre-infusion checklist completed and submitted online. PIV placed with positive blood return. Tolerated treatment well with no complications. Patient refused 1 hour post observation but did stay for a 30 minute observation. Vitals stable. Walked out in stable condition.    Charisse Bowman is aware of future appt on 4/8 at 3 pm. PIV removed.    AVS declined.

## 2025-03-11 NOTE — TELEPHONE ENCOUNTER
Called patient to remind her she needs her labs drawn before her treatment this afternoon. Patient stated she would get her labs drawn.

## 2025-03-12 ENCOUNTER — TELEPHONE (OUTPATIENT)
Dept: NEUROLOGY | Facility: CLINIC | Age: 40
End: 2025-03-12

## 2025-03-12 NOTE — TELEPHONE ENCOUNTER
Spoke with patient to confirm patient's appointment on  3/19 with Damián.  Confirmed pt has access to working microphone and camera

## 2025-03-19 ENCOUNTER — TELEPHONE (OUTPATIENT)
Dept: NEUROLOGY | Facility: CLINIC | Age: 40
End: 2025-03-19

## 2025-03-19 NOTE — TELEPHONE ENCOUNTER
Attempted to connect pt to vv but pt could not hear me. Tried 2 different computers and microhpnes but pt still could not hear me. Calling pt and explained that if she couldn't hear me, we would have to reschedule. Pt was agreeable. Offered pt 3/24 at 730am slot but pt declined. Offered 3/31 8am slot which pt accepted

## 2025-03-27 ENCOUNTER — TELEPHONE (OUTPATIENT)
Dept: NEUROLOGY | Facility: CLINIC | Age: 40
End: 2025-03-27

## 2025-03-27 NOTE — TELEPHONE ENCOUNTER
Pt is scheduled for Tysabri on 4/8/25.   Last infusion 3/11/25, infusions every 4 weeks. Confirmed scheduling is correct.      Last CBC/CMP 3/11/25  Last JCV 2/11/25 0.27 (final result negative)  Last MRI brain 7/7/24     Okay to proceed with infusion?     Tysabri is approved with [employer] from 2/14/25 to 2/14/26 at [facility]) infusion center. Auth # [id number].     Tysabri TOUCH auth valid until 7/31/25 at [facility] infusion center.

## 2025-03-31 ENCOUNTER — TELEMEDICINE (OUTPATIENT)
Dept: NEUROLOGY | Facility: CLINIC | Age: 40
End: 2025-03-31
Payer: COMMERCIAL

## 2025-03-31 DIAGNOSIS — G35 MS (MULTIPLE SCLEROSIS) (HCC): Primary | ICD-10-CM

## 2025-03-31 DIAGNOSIS — G43.009 MIGRAINE WITHOUT AURA AND WITHOUT STATUS MIGRAINOSUS, NOT INTRACTABLE: ICD-10-CM

## 2025-03-31 PROCEDURE — 99214 OFFICE O/P EST MOD 30 MIN: CPT

## 2025-03-31 RX ORDER — SUMATRIPTAN SUCCINATE 100 MG/1
100 TABLET ORAL AS NEEDED
Qty: 12 TABLET | Refills: 1 | Status: SHIPPED | OUTPATIENT
Start: 2025-03-31

## 2025-03-31 NOTE — ASSESSMENT & PLAN NOTE
Charisse Bowman is a 40 year old female with Multiple Sclerosis maintained on Tysabri 300 mg q 4 weeks. She is tolerating this regimen well with no new or worsening neurologic complaints concerning for disease progression. She continues to be JCV negative, next due for repeat August 2025; orders placed today. She is up to date on surveillance MRI imaging of the Brain, Cervical and Thoracic spine, last completed 7/2024. We will repeat MRI Brain MS wo in July 2025 for annual surveillance. She is up to date on other surveillance labs. Due to persistent muscle cramping, she was advised to try methocarbamol qid, she should send a message with an update in 2 weeks. If not improved, then will consider a transition to flexeril or tizanidine although discussed this is more likely to be sedating than methocarbamol. She will continue her current dose of Gabapentin and she was encouraged to continue to attend physical therapy. We discussed Tysabri will continue q4 weeks until 8/2025 and then we will consider transition to q6 weeks. She will follow up in 4-5 months; sooner if needed.      Orders:    MRI brain MS wo contrast; Future    STRATIFY JCV ANTIBODY; Future

## 2025-03-31 NOTE — ASSESSMENT & PLAN NOTE
She reports a hx of migraine headaches  She has never trialed an abortive medication but would like to.  Sent Sumatriptan 100 mg to be used at the onset of a migraine, and can be repeated x1 dose in 2 hours if needed.    Orders:    SUMAtriptan (IMITREX) 100 mg tablet; Take 1 tablet (100 mg total) by mouth as needed for migraine May repeat in 2 hours. Limit of 3/week or 12/month

## 2025-03-31 NOTE — PATIENT INSTRUCTIONS
- Continue Tysabri q 4 weeks  - JCV ab (lab) in early August 2025  - MRI Brain MS wo in July 2025   - Continue with physical therapy   - Start Sumatriptan as needed for migraine headaches   - Increase methocarbamol to 4 times daily  - Follow up with hematology   - Follow up in 4-5 months in [place] or telemedicine

## 2025-03-31 NOTE — PROGRESS NOTES
Name: Charisse Bowman      : 1985      MRN: [id number]  Encounter Provider: ANEESH Khan  Encounter Date: 3/31/2025   Encounter department: [facility] [place]  :  Assessment & Plan      Patient Instructions   - Continue Tysabri q 6 weeks  - JCV ab (lab) in early 2025  - MRI Brain MS wo in 2025   - Continue with physical therapy   - Start Sumatriptan as needed for migraine headaches   - Increase methocarbamol to 4 times daily  - Follow up with hematology   - Follow up in 4-5 months in [place] or telemedicine        History of Present Illness {?Quick Links Encounters * My Last Note * Last Note in Specialty * Snapshot * Since Last Visit * History :91024}    HPI       Going to PT/OT  Infusion 3/11/2025  Gabapentin 1200 mg qhs   Methocarbamol 750 mg prn 2-3 times a day   Overdue for a follow with hematology; still iron deficienct   JCV 2025 negative 0.27  CBC/CMP 3/7/2025    No infusion reactions     No new numbness, tingling, weakness, dizziness  She is having migraines  She denies any changes in her vision, bowel or bladder    15 minutw=-rs     Review of Systems   Constitutional:  Negative for appetite change, fatigue and fever.   HENT: Negative.  Negative for hearing loss, tinnitus, trouble swallowing and voice change.    Eyes: Negative.  Negative for photophobia, pain and visual disturbance.   Respiratory: Negative.  Negative for shortness of breath.    Cardiovascular: Negative.  Negative for palpitations.   Gastrointestinal: Negative.  Negative for nausea and vomiting.   Endocrine: Negative.  Negative for cold intolerance.   Genitourinary: Negative.  Negative for dysuria, frequency and urgency.   Musculoskeletal:  Negative for back pain, gait problem, myalgias, neck pain and neck stiffness.   Skin: Negative.  Negative for rash.   Allergic/Immunologic: Negative.    Neurological:  Negative for dizziness, tremors, seizures, syncope, facial asymmetry, speech  difficulty, weakness, light-headedness, numbness and headaches.   Hematological: Negative.  Does not bruise/bleed easily.   Psychiatric/Behavioral: Negative.  Negative for confusion, hallucinations and sleep disturbance.    All other systems reviewed and are negative.    I have personally reviewed the MA's review of systems and made changes as necessary.    Current Outpatient Medications on File Prior to Visit   Medication Sig Dispense Refill    fluticasone (FLONASE) 50 mcg/act nasal spray 2 sprays into each nostril daily      gabapentin (NEURONTIN) 100 mg capsule take 200 mg bid (in addition to separate rx for 1200 mg qhs) 360 capsule 0    gabapentin (NEURONTIN) 600 MG tablet Take 2 tablets (1,200 mg total) by mouth daily at bedtime 180 tablet 0    meloxicam (Mobic) 15 mg tablet Take 1 tablet (15 mg total) by mouth daily With food 45 tablet 2    meloxicam (MOBIC) 7.5 mg tablet Take 7.5 mg by mouth daily      methocarbamol (ROBAXIN) 750 mg tablet Take 1 tablet (750 mg total) by mouth every 6 (six) hours as needed for muscle spasms 120 tablet 0    metroNIDAZOLE (FLAGYL) 500 mg tablet 500 mg      buPROPion (WELLBUTRIN XL) 150 mg 24 hr tablet Take 1 tablet by mouth every morning      lidocaine (LIDODERM) 5 % Apply 1 patch topically daily as needed (R. Knee pain) Remove & Discard patch within 12 hours or as directed by MD 15 patch 0    valACYclovir (VALTREX) 1,000 mg tablet 1,000 mg (Patient not taking: Reported on 3/31/2025)       No current facility-administered medications on file prior to visit.         Objective {?Quick Links Trend Vitals * Enter New Vitals * Results Review * Timeline (Adult) * Labs * Imaging * Cardiology * Procedures * Lung Cancer Screening * Surgical eConsent :59157}  There were no vitals taken for this visit.    Physical Exam      Neurological Exam      {Administrative / Billing Section (Optional):35415}

## 2025-03-31 NOTE — PROGRESS NOTES
Virtual Regular VisitName: Charisse Bowman      : 1985      MRN: [id number]  Encounter Provider: ANEESH Khan  Encounter Date: 3/31/2025   Encounter department: [facility] [place]  :  Assessment & Plan  MS (multiple sclerosis) ([facility])  Charisse Bowman is a 40 year old female with Multiple Sclerosis maintained on Tysabri 300 mg q 4 weeks. She is tolerating this regimen well with no new or worsening neurologic complaints concerning for disease progression. She continues to be JCV negative, next due for repeat 2025; orders placed today. She is up to date on surveillance MRI imaging of the Brain, Cervical and Thoracic spine, last completed 2024. We will repeat MRI Brain MS wo in 2025 for annual surveillance. She is up to date on other surveillance labs. Due to persistent muscle cramping, she was advised to try methocarbamol qid, she should send a message with an update in 2 weeks. If not improved, then will consider a transition to flexeril or tizanidine although discussed this is more likely to be sedating than methocarbamol. She will continue her current dose of Gabapentin and she was encouraged to continue to attend physical therapy. We discussed Tysabri will continue q4 weeks until 2025 and then we will consider transition to q6 weeks. She will follow up in 4-5 months; sooner if needed.      Orders:    MRI brain MS wo contrast; Future    STRATIFY JCV ANTIBODY; Future    Migraine without aura and without status migrainosus, not intractable  She reports a hx of migraine headaches  She has never trialed an abortive medication but would like to.  Sent Sumatriptan 100 mg to be used at the onset of a migraine, and can be repeated x1 dose in 2 hours if needed.    Orders:    SUMAtriptan (IMITREX) 100 mg tablet; Take 1 tablet (100 mg total) by mouth as needed for migraine May repeat in 2 hours. Limit of 3/week or 12/month    MS (multiple sclerosis) ([facility])          Migraine without aura and without status migrainosus, not intractable               History of Present Illness     HPI Charisse Bowman is a 40 y.o. female  known to the practice for Multiple Sclerosis. She was last seen 11/5/2024 via telemedicine. She is maintained on Tysabri q 4 weeks initiated 7/31/2023. Last infusion was 3/11/2025. She denies any infusion reactions and has tolerated her infusions well with no adverse effects.      Labs   JCV 2/11/2025 negative 0.27  3/11/2025 CBC and differential/CMP      MRI Thoracic spine wwo 7/13/24  1. Stable patchy cord signal abnormality at T8-T9, most consistent with demyelinating disease. No evidence of active inflammation/demyelination.  2. Partially evaluated multilevel cervical spine posterior disc abnormalities, notably at C6-C7 where a posterior disc bulge with superimposed central disc herniation and annular fissure result in at least moderate central spinal canal stenosis,   appearing to be slightly increased from MRI cervical spine 5/28/2023. This could be further evaluated with dedicated MRI of the cervical spine, as clinically appropriate.     MRI Cervical spine wwo 7/7/2024  1. Stable cervical cord lesions in keeping with demyelinating disease. No evidence of active demyelination/inflammation.  2. Multilevel spondylotic changes of the cervical spine, most notably moderate central spinal canal stenosis with ventral indentation of the thecal sac at C6-C7.  3. Nonspecific cystic left hemimandibular lesion, possibly periapical lucency. Correlate with dental examination.  4. Thyromegaly.     MRI Brain wwo 7/7/2024   Distribution and configuration of white matter lesions described above are nonspecific but consistent with demyelinating plaques of multiple sclerosis.  No active demyelination evident.   Interval normalization of previously noted gadolinium enhancing plaques. No other interval change evident compared to the prior.     She is taking methocarbamol 750  mg twice daily, she has not tried to increase this further; this was changed from Baclofen after she did not find Baclofen to be effective.   Unfortunately she does continue with significant cramping of her hands   Continues on Gabapentin 1200 mg qhs   She reports having a lot of fatigue  She denies any new numbness, tingling, weakness, dizziness, vision changes, or changes in bowel or bladder.   She denies any recent infections, UTIs, URIs, etc.   She is attending physical and occupational therapy  She also reports her right leg continues to be weaker than her left leg. She is still ambulating with no assistive devices.   She is having migraine headaches (which she has a long hx of). She has never used any abortive medications. She typically just tries to sleep through them.   In the past she was following with Hematology and was receiving iron infusions.  She has not recently followed up and is over due  She denies any new acute focal symptoms to suggest MS relapse.      Review of Systems   Constitutional:  Positive for fatigue. Negative for appetite change and fever.   HENT: Negative.  Negative for hearing loss, tinnitus, trouble swallowing and voice change.    Eyes: Negative.  Negative for photophobia, pain and visual disturbance.   Respiratory: Negative.  Negative for shortness of breath.    Cardiovascular: Negative.  Negative for palpitations.   Gastrointestinal: Negative.  Negative for nausea and vomiting.   Endocrine: Negative.  Negative for cold intolerance.   Genitourinary: Negative.  Negative for dysuria, frequency and urgency.   Musculoskeletal:  Positive for myalgias. Negative for back pain, gait problem, neck pain and neck stiffness.   Skin: Negative.  Negative for rash.   Allergic/Immunologic: Negative.    Neurological:  Positive for headaches. Negative for dizziness, tremors, seizures, syncope, facial asymmetry, speech difficulty, weakness, light-headedness and numbness.   Hematological: Negative.   Does not bruise/bleed easily.   Psychiatric/Behavioral: Negative.  Negative for confusion, hallucinations and sleep disturbance.      I have personally reviewed the MA's review of systems and made changes as necessary.     Objective   There were no vitals taken for this visit.    Physical Exam    On neurological examination the patient was awake, alert, attentive, oriented to person, place, and time. Recent and remote memory intact to conversation with no evidence of language dysfunction. Satisfactory fund of knowledge. Normal attention span and concentration.  Mood, affect and judgement are appropriate. Speech is fluent without dysarthria or aphasia. Face appears symmetric, with no obvious weakness noted.  Audition is intact to casual conversation.      Administrative Statements   Encounter provider ANEESH Khan    The Patient is located at Home and in the following state in which I hold an active license [place].    The patient was identified by name and date of birth. Charisse Bowman was informed that this is a telemedicine visit and that the visit is being conducted through the Epic Embedded platform. She agrees to proceed..  My office door was closed. No one else was in the room.  She acknowledged consent and understanding of privacy and security of the video platform. The patient has agreed to participate and understands they can discontinue the visit at any time.    I have spent a total time of 15 minutes in caring for this patient on the day of the visit/encounter including Diagnostic results, Prognosis, Risks and benefits of tx options, Instructions for management, Patient and family education, Importance of tx compliance, Risk factor reductions, Impressions, Counseling / Coordination of care, Documenting in the medical record, Reviewing/placing orders in the medical record (including tests, medications, and/or procedures), and Obtaining or reviewing history  , not including the time spent for  establishing the audio/video connection.

## 2025-04-08 ENCOUNTER — TELEPHONE (OUTPATIENT)
Dept: INFUSION CENTER | Facility: CLINIC | Age: 40
End: 2025-04-08

## 2025-04-08 ENCOUNTER — APPOINTMENT (OUTPATIENT)
Dept: LAB | Facility: HOSPITAL | Age: 40
End: 2025-04-08
Payer: COMMERCIAL

## 2025-04-08 ENCOUNTER — HOSPITAL ENCOUNTER (OUTPATIENT)
Dept: INFUSION CENTER | Facility: CLINIC | Age: 40
Discharge: HOME | End: 2025-04-08
Payer: COMMERCIAL

## 2025-04-08 VITALS
TEMPERATURE: 97.3 F | SYSTOLIC BLOOD PRESSURE: 114 MMHG | RESPIRATION RATE: 18 BRPM | HEART RATE: 64 BPM | DIASTOLIC BLOOD PRESSURE: 72 MMHG

## 2025-04-08 DIAGNOSIS — G35 MS (MULTIPLE SCLEROSIS) (HCC): ICD-10-CM

## 2025-04-08 DIAGNOSIS — G35 MS (MULTIPLE SCLEROSIS) (HCC): Primary | ICD-10-CM

## 2025-04-08 LAB
ALBUMIN SERPL BCG-MCNC: 4.1 G/DL (ref 3.5–5)
ALP SERPL-CCNC: 35 U/L (ref 34–104)
ALT SERPL W P-5'-P-CCNC: 9 U/L (ref 7–52)
ANION GAP SERPL CALCULATED.3IONS-SCNC: 6 MMOL/L (ref 4–13)
AST SERPL W P-5'-P-CCNC: 10 U/L (ref 13–39)
BASOPHILS # BLD AUTO: 0.04 THOUSANDS/ÂΜL (ref 0–0.1)
BASOPHILS NFR BLD AUTO: 1 % (ref 0–1)
BILIRUB SERPL-MCNC: 0.38 MG/DL (ref 0.2–1)
BUN SERPL-MCNC: 12 MG/DL (ref 5–25)
CALCIUM SERPL-MCNC: 8.7 MG/DL (ref 8.4–10.2)
CHLORIDE SERPL-SCNC: 104 MMOL/L (ref 96–108)
CO2 SERPL-SCNC: 27 MMOL/L (ref 21–32)
CREAT SERPL-MCNC: 0.79 MG/DL (ref 0.6–1.3)
EOSINOPHIL # BLD AUTO: 0.2 THOUSAND/ÂΜL (ref 0–0.61)
EOSINOPHIL NFR BLD AUTO: 3 % (ref 0–6)
ERYTHROCYTE [DISTWIDTH] IN BLOOD BY AUTOMATED COUNT: 15.7 % (ref 11.6–15.1)
GFR SERPL CREATININE-BSD FRML MDRD: 93 ML/MIN/1.73SQ M
GLUCOSE P FAST SERPL-MCNC: 88 MG/DL (ref 65–99)
HCT VFR BLD AUTO: 38.1 % (ref 34.8–46.1)
HGB BLD-MCNC: 11.3 G/DL (ref 11.5–15.4)
IMM GRANULOCYTES # BLD AUTO: 0.02 THOUSAND/UL (ref 0–0.2)
IMM GRANULOCYTES NFR BLD AUTO: 0 % (ref 0–2)
LYMPHOCYTES # BLD AUTO: 2.95 THOUSANDS/ÂΜL (ref 0.6–4.47)
LYMPHOCYTES NFR BLD AUTO: 41 % (ref 14–44)
MCH RBC QN AUTO: 21.6 PG (ref 26.8–34.3)
MCHC RBC AUTO-ENTMCNC: 29.7 G/DL (ref 31.4–37.4)
MCV RBC AUTO: 73 FL (ref 82–98)
MONOCYTES # BLD AUTO: 0.55 THOUSAND/ÂΜL (ref 0.17–1.22)
MONOCYTES NFR BLD AUTO: 8 % (ref 4–12)
NEUTROPHILS # BLD AUTO: 3.5 THOUSANDS/ÂΜL (ref 1.85–7.62)
NEUTS SEG NFR BLD AUTO: 47 % (ref 43–75)
NRBC BLD AUTO-RTO: 0 /100 WBCS
PLATELET # BLD AUTO: 249 THOUSANDS/UL (ref 149–390)
PMV BLD AUTO: 10.5 FL (ref 8.9–12.7)
POTASSIUM SERPL-SCNC: 4 MMOL/L (ref 3.5–5.3)
PROT SERPL-MCNC: 7 G/DL (ref 6.4–8.4)
RBC # BLD AUTO: 5.23 MILLION/UL (ref 3.81–5.12)
SODIUM SERPL-SCNC: 137 MMOL/L (ref 135–147)
WBC # BLD AUTO: 7.26 THOUSAND/UL (ref 4.31–10.16)

## 2025-04-08 PROCEDURE — 80053 COMPREHEN METABOLIC PANEL: CPT

## 2025-04-08 PROCEDURE — 85025 COMPLETE CBC W/AUTO DIFF WBC: CPT

## 2025-04-08 PROCEDURE — 86711 JOHN CUNNINGHAM ANTIBODY: CPT

## 2025-04-08 PROCEDURE — 96367 TX/PROPH/DG ADDL SEQ IV INF: CPT

## 2025-04-08 PROCEDURE — 36415 COLL VENOUS BLD VENIPUNCTURE: CPT

## 2025-04-08 PROCEDURE — 96365 THER/PROPH/DIAG IV INF INIT: CPT

## 2025-04-08 RX ORDER — ACETAMINOPHEN 325 MG/1
650 TABLET ORAL ONCE
OUTPATIENT
Start: 2025-05-06 | End: 2025-05-06

## 2025-04-08 RX ORDER — SODIUM CHLORIDE 9 MG/ML
20 INJECTION, SOLUTION INTRAVENOUS ONCE
OUTPATIENT
Start: 2025-05-06

## 2025-04-08 RX ORDER — ACETAMINOPHEN 325 MG/1
650 TABLET ORAL ONCE
Status: COMPLETED | OUTPATIENT
Start: 2025-04-08 | End: 2025-04-08

## 2025-04-08 RX ORDER — SODIUM CHLORIDE 9 MG/ML
20 INJECTION, SOLUTION INTRAVENOUS ONCE
Status: COMPLETED | OUTPATIENT
Start: 2025-04-08 | End: 2025-04-08

## 2025-04-08 RX ADMIN — DIPHENHYDRAMINE HYDROCHLORIDE 50 MG: 50 INJECTION INTRAMUSCULAR; INTRAVENOUS at 15:47

## 2025-04-08 RX ADMIN — SODIUM CHLORIDE 20 ML/HR: 9 INJECTION, SOLUTION INTRAVENOUS at 15:47

## 2025-04-08 RX ADMIN — NATALIZUMAB 300 MG: 300 INJECTION INTRAVENOUS at 16:25

## 2025-04-08 RX ADMIN — ACETAMINOPHEN 650 MG: 325 TABLET, FILM COATED ORAL at 15:47

## 2025-04-08 NOTE — TELEPHONE ENCOUNTER
Reminded patient she needs her labs drawn before her appointment this afternoon. Patient stated she would get them drawn.

## 2025-04-08 NOTE — PROGRESS NOTES
Pt tolerated remainder of the infusion without issue. Pt stayed for 30 min observation without complications. PIV removed. Pt left clinic in stable condition.

## 2025-04-08 NOTE — PROGRESS NOTES
Pt into clinic for tysabri. Pt offers complaints of brain fog and migraines. Per Carleen Osullivan, office RN for Dr Logan, migraines are known condition.     Tolerating infusion without reaction.     Pt aware of next appointment on 5/6/25 at 2:30pm. AVS printed and received.      Report given to Latanya AMARO RN.

## 2025-04-09 ENCOUNTER — OFFICE VISIT (OUTPATIENT)
Dept: PAIN MEDICINE | Facility: CLINIC | Age: 40
End: 2025-04-09

## 2025-04-09 VITALS — WEIGHT: 139 LBS | HEIGHT: 61 IN | BODY MASS INDEX: 26.24 KG/M2

## 2025-04-09 DIAGNOSIS — M79.18 MYOFASCIAL PAIN SYNDROME: Primary | ICD-10-CM

## 2025-04-09 DIAGNOSIS — G35 MS (MULTIPLE SCLEROSIS) (HCC): ICD-10-CM

## 2025-04-09 DIAGNOSIS — M25.511 CHRONIC RIGHT SHOULDER PAIN: ICD-10-CM

## 2025-04-09 DIAGNOSIS — M54.2 NECK PAIN: ICD-10-CM

## 2025-04-09 DIAGNOSIS — G89.29 CHRONIC RIGHT SHOULDER PAIN: ICD-10-CM

## 2025-04-09 NOTE — PROGRESS NOTES
Pain Medicine Follow-Up Note    Assessment:  1. Myofascial pain syndrome    2. MS (multiple sclerosis) (HCC)    3. Chronic right shoulder pain    4. Neck pain        Plan:  Orders Placed This Encounter   Procedures   • Ambulatory referral to Physical Therapy     Standing Status:   Future     Expiration Date:   4/9/2026     Referral Priority:   Routine     Referral Type:   Physical Therapy     Referral Reason:   Specialty Services Required     Requested Specialty:   Physical Therapy     Number of Visits Requested:   1     Expiration Date:   4/9/2026       My impressions and treatment recommendations were discussed in detail with the patient who verbalized understanding and had no further questions.      Patient returns to the office reporting worsening right sided neck/shoulder pain.  Patient states that she has been following with her neurologist for her MS who has educated her on the use of her medications.  Patient states that she hates taking pills and prefers to suffer silently.  Neurologist encouraged patient to use methocarbamol 500 mg up to 4 times a day as needed for pain/muscle spasms, patient states that she will make a better effort to take these medications more consistently.  Patient does have trigger points noted throughout her right paraspinal/trapezius areas and has reported significant pain relief following the previous trigger point injection that she had on 10/23/2024.  I recommend that she have repeat trigger point injections at this time. Complete risks and benefits including bleeding, infection, tissue reaction, nerve injury and allergic reaction were discussed. The approach was demonstrated using models and literature was provided. Verbal and written consent was obtained.    Also recommend the patient trial adding magnesium glycinate 200 mg at bedtime and continue with physical therapy, referral provided.    Follow-up is planned in 4 weeks after injections time or sooner as warranted.   Discharge instructions were provided. I personally saw and examined the patient and I agree with the above discussed plan of care.    History of Present Illness:    Charisse Bowman is a 40 y.o. female who presents to [facility] for interval re-evaluation of the above stated pain complaints. The patient has a past medical and chronic pain history as outlined in the assessment section. She was last seen on 10/23/2024.    At today's visit patient states that their pain symptoms are the same with a pain score of 7/10 on the verbal numeric pain scale.  The patient's pain is worse in the morning, evening and at night.  The patient's pain is constant in nature.  And the quality of the patient's pain is described as cramping.  The patient's pain is located in the right side of neck/shoulder and right knee.  Patient is currently being prescribed meloxicam, methocarbamol, and gabapentin through other providers.    Other than as stated above, the patient denies any interval changes in medications, medical condition, mental condition, symptoms, or allergies since the last office visit.         Review of Systems:    Review of Systems   Respiratory:  Negative for shortness of breath.    Cardiovascular:  Negative for chest pain.   Gastrointestinal:  Negative for constipation, diarrhea, nausea and vomiting.   Musculoskeletal:  Positive for arthralgias and myalgias. Negative for gait problem and joint swelling.   Skin:  Negative for rash.   Neurological:  Negative for dizziness, seizures and weakness.   Psychiatric/Behavioral:          Memory Loss   All other systems reviewed and are negative.        Past Medical History:   Diagnosis Date   • Anxiety    • Depression    • Multiple sclerosis (HCC)        Past Surgical History:   Procedure Laterality Date   •  SECTION     • IR LUMBAR PUNCTURE  2021       History reviewed. No pertinent family history.    Social History     Occupational  History   • Not on file   Tobacco Use   • Smoking status: Former     Current packs/day: 0.00     Average packs/day: 0.3 packs/day for 16.0 years (5.0 ttl pk-yrs)     Types: Cigarettes     Start date: 7/15/1999     Quit date: 2011     Years since quittin.6   • Smokeless tobacco: Never   Vaping Use   • Vaping status: Never Used   Substance and Sexual Activity   • Alcohol use: Not Currently     Comment: socially with mom   • Drug use: Yes     Types: Marijuana     Comment: not medical   • Sexual activity: Yes     Partners: Male     Birth control/protection: I.U.D.         Current Outpatient Medications:   •  fluticasone (FLONASE) 50 mcg/act nasal spray, 2 sprays into each nostril daily, Disp: , Rfl:   •  gabapentin (NEURONTIN) 100 mg capsule, take 200 mg bid (in addition to separate rx for 1200 mg qhs), Disp: 360 capsule, Rfl: 0  •  gabapentin (NEURONTIN) 600 MG tablet, Take 2 tablets (1,200 mg total) by mouth daily at bedtime, Disp: 180 tablet, Rfl: 0  •  meloxicam (Mobic) 15 mg tablet, Take 1 tablet (15 mg total) by mouth daily With food, Disp: 45 tablet, Rfl: 2  •  methocarbamol (ROBAXIN) 750 mg tablet, Take 1 tablet (750 mg total) by mouth every 6 (six) hours as needed for muscle spasms (Patient taking differently: Take 750 mg by mouth every 6 (six) hours as needed for muscle spasms 1x daily), Disp: 120 tablet, Rfl: 0  •  metroNIDAZOLE (FLAGYL) 500 mg tablet, 500 mg, Disp: , Rfl:   •  SUMAtriptan (IMITREX) 100 mg tablet, Take 1 tablet (100 mg total) by mouth as needed for migraine May repeat in 2 hours. Limit of 3/week or 12/month, Disp: 12 tablet, Rfl: 1  •  buPROPion (WELLBUTRIN XL) 150 mg 24 hr tablet, Take 1 tablet by mouth every morning, Disp: , Rfl:   •  lidocaine (LIDODERM) 5 %, Apply 1 patch topically daily as needed (R. Knee pain) Remove & Discard patch within 12 hours or as directed by MD, Disp: 15 patch, Rfl: 0  No current facility-administered medications for this visit.    Facility-Administered  "Medications Ordered in Other Visits:   •  alteplase (CATHFLO) injection 2 mg, 2 mg, Intracatheter, Q1MIN PRN, Nadeen Logan MD    Allergies   Allergen Reactions   • Bee Pollen Other (See Comments)     Other reaction(s): Nasal Congestion   • Pollen Extract Nasal Congestion       Physical Exam:    Ht 5' 1\" (1.549 m)   Wt 63 kg (139 lb)   BMI 26.26 kg/m²     Constitutional:normal, well developed, well nourished, alert, in no distress and non-toxic and no overt pain behavior.  Eyes:anicteric  HEENT:grossly intact  Neck: Tenderness to palpation throughout, trigger points noted right side neck, shoulder  Pulmonary:even and unlabored  Cardiovascular:No edema or pitting edema present  Skin:Normal without rashes or lesions and well hydrated  Psychiatric:Mood and affect appropriate  Neurologic:Cranial Nerves II-XII grossly intact  Musculoskeletal:normal gait      Orders Placed This Encounter   Procedures   • Ambulatory referral to Physical Therapy       This document was created using speech voice recognition software.   Grammatical errors, random word insertions, pronoun errors, and incomplete sentences are an occasional consequence of this system due to software limitations, ambient noise, and hardware issues.   Any formal questions or concerns about content, text, or information contained within the body of this dictation should be directly addressed to the provider for clarification.    "

## 2025-04-09 NOTE — PATIENT INSTRUCTIONS
Trigger Point Injection   WHAT YOU NEED TO KNOW:   What do I need to know about a trigger point injection?  A trigger point injection is used to relax a muscle knot. This helps relieve pain. You may be able to have more than one trigger point treated during a session.  How do I prepare for a trigger point injection?   Your healthcare provider will tell you how to prepare. Arrange to have someone drive you home after the injection.    Tell your provider about all medicines you take, including pain medicine, blood thinners, and muscle relaxers. He or she will tell you if you need to stop any medicine for the injection, and when to stop. He or she will tell you which medicines to take or not take on the day of the injection.    Tell your provider about all your allergies, including to any pain medicine.    What will happen during a trigger point injection?   You may be sitting or lying, depending on where the trigger point is located. Your healthcare provider will feel for a knot in the muscle. He or she may alexx your skin over the knot.    Your provider will put a needle through your skin and into the trigger point. Saline (salt solution), pain relievers, or other medicines may be pushed through the needle into the trigger point. Your provider may use only a dry needle (no medicine). He or she will pull the needle almost all the way out and then push it in again. He or she will repeat this several times until the muscle stops twitching or feels relaxed.    Your provider will remove the needle and stretch the muscle area. He or she may apply pressure to the area for 2 minutes. A bandage will be put over the injection site to prevent bleeding or an infection.    What should I expect after a trigger point injection?   You may feel pain relief right away. It is normal for some pain to start again 2 hours later. An ice pack or over-the-counter pain medicine can help lower the pain.    You may feel sore in the injection  site for a few days. If you need another injection in the same area, wait until the area is not sore.    Your healthcare provider may give you specific activity instructions to follow at home or recommend physical therapy. In general, you should try to stay active. Avoid strenuous activity for the first 3 or 4 days after the injection.    Do not have more injections if you still have trigger point pain after 2 or 3 injections.    What are the risks of a trigger point injection?  You may have a severe allergic reaction to pain medicine injected. The injection may be painful, or you may be sore where you got the injection. You may bleed, bruise, or develop an infection in the injection area. The injection may cause you to feel faint. Rarely, the needle may cause muscle or blood vessel damage or your lung may collapse if you get the injection near your chest.  CARE AGREEMENT:   You have the right to help plan your care. Learn about your health condition and how it may be treated. Discuss treatment options with your healthcare providers to decide what care you want to receive. You always have the right to refuse treatment. The above information is an  only. It is not intended as medical advice for individual conditions or treatments. Talk to your doctor, nurse or pharmacist before following any medical regimen to see if it is safe and effective for you.  © Copyright [employer] 2022 Information is for End User's use only and may not be sold, redistributed or otherwise used for commercial purposes. All illustrations and images included in CareNotes® are the copyrighted property of [employer] or [employer]

## 2025-04-17 LAB
GALACTOMANNAN AG SERPL IA-ACNC: 0.15
JCPYV AB SERPL QL IA: NEGATIVE
JCPYV AB SERPL QL IA: NORMAL
SPECIMEN STATUS: NORMAL

## 2025-04-23 ENCOUNTER — OFFICE VISIT (OUTPATIENT)
Dept: HEMATOLOGY ONCOLOGY | Facility: CLINIC | Age: 40
End: 2025-04-23
Payer: COMMERCIAL

## 2025-04-23 VITALS
SYSTOLIC BLOOD PRESSURE: 116 MMHG | HEART RATE: 77 BPM | OXYGEN SATURATION: 98 % | HEIGHT: 61 IN | BODY MASS INDEX: 27.94 KG/M2 | DIASTOLIC BLOOD PRESSURE: 62 MMHG | WEIGHT: 148 LBS | TEMPERATURE: 98.2 F | RESPIRATION RATE: 17 BRPM

## 2025-04-23 DIAGNOSIS — Z12.31 BREAST CANCER SCREENING BY MAMMOGRAM: ICD-10-CM

## 2025-04-23 DIAGNOSIS — D64.9 NORMOCYTIC ANEMIA: Primary | ICD-10-CM

## 2025-04-23 DIAGNOSIS — N92.1 MENORRHAGIA WITH IRREGULAR CYCLE: ICD-10-CM

## 2025-04-23 DIAGNOSIS — D56.3 ALPHA THALASSAEMIA MINOR: ICD-10-CM

## 2025-04-23 PROCEDURE — 99214 OFFICE O/P EST MOD 30 MIN: CPT | Performed by: PHYSICIAN ASSISTANT

## 2025-04-23 NOTE — PROGRESS NOTES
Name: Charisse Bowman      : 1985      MRN: [id number]  Encounter Provider: Blanca Tabares PA-C  Encounter Date: 2025   Encounter department: [facility] [place]  :  Assessment & Plan  Normocytic anemia  11.3g/dL.   Suspect this may be her baseline given history of alpha thalassemia minor however she is complaining of persistent fatigue.  We will obtain nutritional studies including B12, folate and repeat her iron level to assess if there are any nutritional deficiencies causing her symptoms and borderline anemia     Orders:    Vitamin B12; Future    Folate; Future    CBC and differential; Future    Iron Panel (Includes Ferritin, Iron Sat%, Iron, and TIBC); Future    Alpha thalassaemia minor  Patient reeducated on alpha thalassemia minor.  She reports her children recently were found to have anemia and were started on iron supplements.  I recommend genetic testing for children to determine if they also have alpha thalassemia in which case, iron replacement may not be needed.   Orders:    Iron Panel (Includes Ferritin, Iron Sat%, Iron, and TIBC); Future    Menorrhagia with irregular cycle  Follow up with GYN for management   At risk for recurrent LINDA given persistent menorrhagia, recommend CBC and iron panel every 6m   Orders:    Iron Panel (Includes Ferritin, Iron Sat%, Iron, and TIBC); Future    Breast cancer screening by mammogram  Overdue for screening mammogram.  Order placed  Orders:    Mammo screening bilateral w 3d and cad; Future        Return in about 6 months (around 10/23/2025) for Office Visit.    History of Present Illness   Chief Complaint   Patient presents with    Follow-up   History of Present Illness  The patient is a 40-year-old female who presents today for a follow-up of iron deficiency anemia secondary to menorrhagia. She was also recently discovered to have alpha thalassemia minor.    Persistent menorrhagia is reported, with menstrual  "cycles being both heavy and irregular. A copper IUD is currently in place, and a hormonal IUD is being considered as a potential solution to her bleeding issues. video capsule endoscopy was performed, yielding no significant findings. No iron supplements are currently being taken, with the last intravenous iron treatment received in the summer of 2024. Constant fatigue and lightheadedness, particularly when rising too quickly, are experienced, but no presence of blood in the urine or stool, or black stools is reported. A moderate amount of meat is consumed, along with increased salads and vegetables, while white rice has been eliminated from the diet due to bloating. No bowel issues.     She has three children, the youngest two of whom were diagnosed with anemia at ages 10 and 11, respectively. They were started on oral iron. No bowel issues are reported, and a normal diet is maintained, which is believed to have improved compared to a few months ago. A moderate amount of meat is consumed, along with increased salads and vegetables, while white rice has been eliminated from the diet due to bloating. Testing for celiac disease has been done in the past. She has never undergone a mammogram and does not have polycystic ovarian syndrome.    FAMILY HISTORY  Her two children were anemic around the ages of 10 and 11.    Pertinent Medical History     04/23/25: as above      Review of Systems   All other systems reviewed and are negative.          Objective   /62 (BP Location: Left arm, Patient Position: Sitting, Cuff Size: Adult)   Pulse 77   Temp 98.2 °F (36.8 °C) (Temporal)   Resp 17   Ht 5' 1\" (1.549 m)   Wt 67.1 kg (148 lb)   SpO2 98%   BMI 27.96 kg/m²       Physical Exam  Cardiovascular: Heart sounds are normal.  Respiratory: Lungs are clear.  No cervical adenopathy     Results  Labs   - Hemoglobin: 11.3   - Red Blood Cell Size: Small    Diagnostic Testing   - Video Capsule Endoscopy: No findings  Labs: I " have reviewed the following labs:  Results for orders placed or performed in visit on 04/08/25   STRATIFY JCV ANTIBODY   Result Value Ref Range    INDEX VALUE 0.15     JCV Antibody Negative     Result Comment     Specimen Status Comment    CBC and differential   Result Value Ref Range    WBC 7.26 4.31 - 10.16 Thousand/uL    RBC 5.23 (H) 3.81 - 5.12 Million/uL    Hemoglobin 11.3 (L) 11.5 - 15.4 g/dL    Hematocrit 38.1 34.8 - 46.1 %    MCV 73 (L) 82 - 98 fL    MCH 21.6 (L) 26.8 - 34.3 pg    MCHC 29.7 (L) 31.4 - 37.4 g/dL    RDW 15.7 (H) 11.6 - 15.1 %    MPV 10.5 8.9 - 12.7 fL    Platelets 249 149 - 390 Thousands/uL    nRBC 0 /100 WBCs    Segmented % 47 43 - 75 %    Immature Grans % 0 0 - 2 %    Lymphocytes % 41 14 - 44 %    Monocytes % 8 4 - 12 %    Eosinophils Relative 3 0 - 6 %    Basophils Relative 1 0 - 1 %    Absolute Neutrophils 3.50 1.85 - 7.62 Thousands/µL    Absolute Immature Grans 0.02 0.00 - 0.20 Thousand/uL    Absolute Lymphocytes 2.95 0.60 - 4.47 Thousands/µL    Absolute Monocytes 0.55 0.17 - 1.22 Thousand/µL    Eosinophils Absolute 0.20 0.00 - 0.61 Thousand/µL    Basophils Absolute 0.04 0.00 - 0.10 Thousands/µL   Comprehensive metabolic panel   Result Value Ref Range    Sodium 137 135 - 147 mmol/L    Potassium 4.0 3.5 - 5.3 mmol/L    Chloride 104 96 - 108 mmol/L    CO2 27 21 - 32 mmol/L    ANION GAP 6 4 - 13 mmol/L    BUN 12 5 - 25 mg/dL    Creatinine 0.79 0.60 - 1.30 mg/dL    Glucose, Fasting 88 65 - 99 mg/dL    Calcium 8.7 8.4 - 10.2 mg/dL    AST 10 (L) 13 - 39 U/L    ALT 9 7 - 52 U/L    Alkaline Phosphatase 35 34 - 104 U/L    Total Protein 7.0 6.4 - 8.4 g/dL    Albumin 4.1 3.5 - 5.0 g/dL    Total Bilirubin 0.38 0.20 - 1.00 mg/dL    eGFR 93 ml/min/1.73sq m

## 2025-04-23 NOTE — PROGRESS NOTES
"Name: Charisse Bowman      : 1985      MRN: [id number]  Encounter Provider: Blanca Tabares PA-C  Encounter Date: 2025   Encounter department: [facility] [place]  :  Assessment & Plan        No follow-ups on file.    History of Present Illness {?Quick Links Encounters * My Last Note * Last Note in Specialty * Snapshot * Since Last Visit * History :67045}  Chief Complaint   Patient presents with    Follow-up     History of Present Illness    Pertinent Medical History   {There is no content from the last Pertinent Medical History section.}  25: ***     Review of Systems  {Select to insert medical history sections (Optional):20822}      Objective {?Quick Links Trend Vitals * Enter New Vitals * Results Review * Timeline (Adult) * Labs * Imaging * Cardiology * Procedures * Lung Cancer Screening * Surgical eConsent :47880}  /62 (BP Location: Left arm, Patient Position: Sitting, Cuff Size: Adult)   Pulse 77   Temp 98.2 °F (36.8 °C) (Temporal)   Resp 17   Ht 5' 1\" (1.549 m)   Wt 67.1 kg (148 lb)   SpO2 98%   BMI 27.96 kg/m²     Physical Exam  Physical Exam      Results    Labs: I have reviewed the following labs:  Results for orders placed or performed in visit on 25   STRATIFY JCV ANTIBODY   Result Value Ref Range    INDEX VALUE 0.15     JCV Antibody Negative     Result Comment     Specimen Status Comment    CBC and differential   Result Value Ref Range    WBC 7.26 4.31 - 10.16 Thousand/uL    RBC 5.23 (H) 3.81 - 5.12 Million/uL    Hemoglobin 11.3 (L) 11.5 - 15.4 g/dL    Hematocrit 38.1 34.8 - 46.1 %    MCV 73 (L) 82 - 98 fL    MCH 21.6 (L) 26.8 - 34.3 pg    MCHC 29.7 (L) 31.4 - 37.4 g/dL    RDW 15.7 (H) 11.6 - 15.1 %    MPV 10.5 8.9 - 12.7 fL    Platelets 249 149 - 390 Thousands/uL    nRBC 0 /100 WBCs    Segmented % 47 43 - 75 %    Immature Grans % 0 0 - 2 %    Lymphocytes % 41 14 - 44 %    Monocytes % 8 4 - 12 %    Eosinophils Relative 3 0 - " 6 %    Basophils Relative 1 0 - 1 %    Absolute Neutrophils 3.50 1.85 - 7.62 Thousands/µL    Absolute Immature Grans 0.02 0.00 - 0.20 Thousand/uL    Absolute Lymphocytes 2.95 0.60 - 4.47 Thousands/µL    Absolute Monocytes 0.55 0.17 - 1.22 Thousand/µL    Eosinophils Absolute 0.20 0.00 - 0.61 Thousand/µL    Basophils Absolute 0.04 0.00 - 0.10 Thousands/µL   Comprehensive metabolic panel   Result Value Ref Range    Sodium 137 135 - 147 mmol/L    Potassium 4.0 3.5 - 5.3 mmol/L    Chloride 104 96 - 108 mmol/L    CO2 27 21 - 32 mmol/L    ANION GAP 6 4 - 13 mmol/L    BUN 12 5 - 25 mg/dL    Creatinine 0.79 0.60 - 1.30 mg/dL    Glucose, Fasting 88 65 - 99 mg/dL    Calcium 8.7 8.4 - 10.2 mg/dL    AST 10 (L) 13 - 39 U/L    ALT 9 7 - 52 U/L    Alkaline Phosphatase 35 34 - 104 U/L    Total Protein 7.0 6.4 - 8.4 g/dL    Albumin 4.1 3.5 - 5.0 g/dL    Total Bilirubin 0.38 0.20 - 1.00 mg/dL    eGFR 93 ml/min/1.73sq m   {SL AMB ONCOLOGY LABS (Optional):18276}    {Radiology Results Review (Optional):79850}    {Administrative / Billing Section (Optional):78244}

## 2025-04-29 ENCOUNTER — TELEPHONE (OUTPATIENT)
Dept: NEUROLOGY | Facility: CLINIC | Age: 40
End: 2025-04-29

## 2025-04-29 NOTE — TELEPHONE ENCOUNTER
Pt is scheduled for Tysabri on 5/6/25.   Last infusion 4/8/25, infusions every 4 weeks. Confirmed scheduling is correct.      Last CBC/CMP 4/8/25  Last JCV 4/8/25 0.15 (negative)  Last MRI brain 7/7/24     Okay to proceed with infusion?     Tysabri is approved with [employer] from 2/14/25 to 2/14/26 at [facility]) infusion center. Auth # [id number].     Tysabri TOUCH auth valid until 7/31/25 at [facility] infusion center.

## 2025-05-07 ENCOUNTER — RESULTS FOLLOW-UP (OUTPATIENT)
Dept: HEMATOLOGY ONCOLOGY | Facility: CLINIC | Age: 40
End: 2025-05-07

## 2025-05-07 ENCOUNTER — HOSPITAL ENCOUNTER (OUTPATIENT)
Dept: INFUSION CENTER | Facility: CLINIC | Age: 40
Discharge: HOME | End: 2025-05-07
Attending: PSYCHIATRY & NEUROLOGY
Payer: COMMERCIAL

## 2025-05-07 ENCOUNTER — TELEPHONE (OUTPATIENT)
Dept: INFUSION CENTER | Facility: CLINIC | Age: 40
End: 2025-05-07

## 2025-05-07 VITALS
TEMPERATURE: 97 F | RESPIRATION RATE: 18 BRPM | HEART RATE: 67 BPM | SYSTOLIC BLOOD PRESSURE: 105 MMHG | DIASTOLIC BLOOD PRESSURE: 66 MMHG

## 2025-05-07 DIAGNOSIS — G35 MS (MULTIPLE SCLEROSIS) (HCC): Primary | ICD-10-CM

## 2025-05-07 LAB
ALBUMIN SERPL BCG-MCNC: 3.9 G/DL (ref 3.5–5)
ALP SERPL-CCNC: 35 U/L (ref 34–104)
ALT SERPL W P-5'-P-CCNC: 9 U/L (ref 7–52)
ANION GAP SERPL CALCULATED.3IONS-SCNC: 6 MMOL/L (ref 4–13)
AST SERPL W P-5'-P-CCNC: 14 U/L (ref 13–39)
BASOPHILS # BLD AUTO: 0.04 THOUSANDS/ÂΜL (ref 0–0.1)
BASOPHILS NFR BLD AUTO: 1 % (ref 0–1)
BILIRUB SERPL-MCNC: 0.32 MG/DL (ref 0.2–1)
BUN SERPL-MCNC: 16 MG/DL (ref 5–25)
CALCIUM SERPL-MCNC: 8.6 MG/DL (ref 8.4–10.2)
CHLORIDE SERPL-SCNC: 106 MMOL/L (ref 96–108)
CO2 SERPL-SCNC: 25 MMOL/L (ref 21–32)
CREAT SERPL-MCNC: 0.79 MG/DL (ref 0.6–1.3)
EOSINOPHIL # BLD AUTO: 0.27 THOUSAND/ÂΜL (ref 0–0.61)
EOSINOPHIL NFR BLD AUTO: 4 % (ref 0–6)
ERYTHROCYTE [DISTWIDTH] IN BLOOD BY AUTOMATED COUNT: 16.1 % (ref 11.6–15.1)
GFR SERPL CREATININE-BSD FRML MDRD: 93 ML/MIN/1.73SQ M
GLUCOSE SERPL-MCNC: 90 MG/DL (ref 65–140)
HCT VFR BLD AUTO: 37.9 % (ref 34.8–46.1)
HGB BLD-MCNC: 11.7 G/DL (ref 11.5–15.4)
IMM GRANULOCYTES # BLD AUTO: 0.03 THOUSAND/UL (ref 0–0.2)
IMM GRANULOCYTES NFR BLD AUTO: 0 % (ref 0–2)
LYMPHOCYTES # BLD AUTO: 3.12 THOUSANDS/ÂΜL (ref 0.6–4.47)
LYMPHOCYTES NFR BLD AUTO: 45 % (ref 14–44)
MCH RBC QN AUTO: 22.2 PG (ref 26.8–34.3)
MCHC RBC AUTO-ENTMCNC: 30.9 G/DL (ref 31.4–37.4)
MCV RBC AUTO: 72 FL (ref 82–98)
MONOCYTES # BLD AUTO: 0.49 THOUSAND/ÂΜL (ref 0.17–1.22)
MONOCYTES NFR BLD AUTO: 7 % (ref 4–12)
NEUTROPHILS # BLD AUTO: 2.93 THOUSANDS/ÂΜL (ref 1.85–7.62)
NEUTS SEG NFR BLD AUTO: 43 % (ref 43–75)
NRBC BLD AUTO-RTO: 0 /100 WBCS
PLATELET # BLD AUTO: 237 THOUSANDS/UL (ref 149–390)
PMV BLD AUTO: 11.2 FL (ref 8.9–12.7)
POTASSIUM SERPL-SCNC: 4 MMOL/L (ref 3.5–5.3)
PROT SERPL-MCNC: 7 G/DL (ref 6.4–8.4)
RBC # BLD AUTO: 5.28 MILLION/UL (ref 3.81–5.12)
SODIUM SERPL-SCNC: 137 MMOL/L (ref 135–147)
WBC # BLD AUTO: 6.88 THOUSAND/UL (ref 4.31–10.16)

## 2025-05-07 PROCEDURE — 96367 TX/PROPH/DG ADDL SEQ IV INF: CPT

## 2025-05-07 PROCEDURE — 80053 COMPREHEN METABOLIC PANEL: CPT | Performed by: PSYCHIATRY & NEUROLOGY

## 2025-05-07 PROCEDURE — 96365 THER/PROPH/DIAG IV INF INIT: CPT

## 2025-05-07 PROCEDURE — 85025 COMPLETE CBC W/AUTO DIFF WBC: CPT | Performed by: PSYCHIATRY & NEUROLOGY

## 2025-05-07 RX ORDER — ACETAMINOPHEN 325 MG/1
650 TABLET ORAL ONCE
Status: COMPLETED | OUTPATIENT
Start: 2025-05-07 | End: 2025-05-07

## 2025-05-07 RX ORDER — SODIUM CHLORIDE 9 MG/ML
20 INJECTION, SOLUTION INTRAVENOUS ONCE
Status: COMPLETED | OUTPATIENT
Start: 2025-05-07 | End: 2025-05-07

## 2025-05-07 RX ORDER — ACETAMINOPHEN 325 MG/1
650 TABLET ORAL ONCE
OUTPATIENT
Start: 2025-06-03 | End: 2025-06-03

## 2025-05-07 RX ORDER — SODIUM CHLORIDE 9 MG/ML
20 INJECTION, SOLUTION INTRAVENOUS ONCE
OUTPATIENT
Start: 2025-06-03

## 2025-05-07 RX ADMIN — NATALIZUMAB 300 MG: 300 INJECTION INTRAVENOUS at 10:08

## 2025-05-07 RX ADMIN — ACETAMINOPHEN 650 MG: 325 TABLET, FILM COATED ORAL at 09:26

## 2025-05-07 RX ADMIN — DIPHENHYDRAMINE HYDROCHLORIDE 50 MG: 50 INJECTION INTRAMUSCULAR; INTRAVENOUS at 09:34

## 2025-05-07 RX ADMIN — SODIUM CHLORIDE 20 ML/HR: 0.9 INJECTION, SOLUTION INTRAVENOUS at 09:28

## 2025-05-07 NOTE — PROGRESS NOTES
Charisse Bowman  tolerated tysabri infusion well with no complications. Peripheral labs drawn via PIV. Pt reporting migraines (addressed at last infusion visit). No new complaints today. Pt observed for 30 minutes post-treatment (per pt request) and was discharged in stable condition.    Charisse Bowman is aware of future appt on 6/3 at 3PM.     AVS printed and given to Charisse Bowman.

## 2025-05-07 NOTE — PLAN OF CARE
Problem: Potential for Falls  Goal: Patient will remain free of falls  Description: INTERVENTIONS:- Educate patient/family on patient safety including physical limitations- Instruct patient to call for assistance with activity - Consult OT/PT to assist with strengthening/mobility - Keep Call bell within reach- Keep bed low and locked with side rails adjusted as appropriate- Keep care items and personal belongings within reach- Initiate and maintain comfort rounds- Make Fall Risk Sign visible to staff- Apply yellow socks and bracelet for high fall risk patients- Consider moving patient to room near nurses station  Outcome: Progressing     Problem: Knowledge Deficit  Goal: Patient/family/caregiver demonstrates understanding of disease process, treatment plan, medications, and discharge instructions  Description: Complete learning assessment and assess knowledge base.Interventions:- Provide teaching at level of understanding- Provide teaching via preferred learning methods  Outcome: Progressing

## 2025-05-07 NOTE — TELEPHONE ENCOUNTER
Patient has 2 no shows within a 6 month period.  Patient no showed on 1/16/25 and 5/6/25.  Infusion techs please contact patient and review the no show policy as well as her next scheduled infusion appt.

## 2025-05-29 ENCOUNTER — TELEPHONE (OUTPATIENT)
Dept: NEUROLOGY | Facility: CLINIC | Age: 40
End: 2025-05-29

## 2025-05-29 NOTE — TELEPHONE ENCOUNTER
Pt is scheduled for Tysabri on 6/3/25.   Last infusion 5/7/25, infusions every 4 weeks.     Pt is scheduled only 27 days from last infusion. Must be minimum of 28 days. Needs reschedule.     Called [facility] infusion center and spoke with Leidy. Rescheduled pt for 6/6/25 at 12:30pm.     Called pt, made her aware. She requested appt the following week. Called infusion center back with pt on the line, spoke with Leidy. Transferred call for reschedule. Infusion has been rescheduled to 6/20/25.

## 2025-06-12 ENCOUNTER — TELEPHONE (OUTPATIENT)
Dept: NEUROLOGY | Facility: CLINIC | Age: 40
End: 2025-06-12

## 2025-06-12 DIAGNOSIS — G35 MS (MULTIPLE SCLEROSIS) (HCC): Primary | ICD-10-CM

## 2025-06-12 RX ORDER — SODIUM CHLORIDE 9 MG/ML
20 INJECTION, SOLUTION INTRAVENOUS ONCE
OUTPATIENT
Start: 2025-06-20

## 2025-06-12 RX ORDER — ACETAMINOPHEN 325 MG/1
650 TABLET ORAL ONCE
OUTPATIENT
Start: 2025-06-20 | End: 2025-06-20

## 2025-06-12 NOTE — TELEPHONE ENCOUNTER
Pt is scheduled for Tysabri on 6/20/25.   Last infusion 5/7/25, infusions every 4 weeks. Confirmed scheduling is correct (delayed due to initial incorrect scheduling)      Last CBC/CMP 5/7/25  Last JCV 4/8/25 0.15 (negative)  Last MRI brain 7/7/24     Okay to proceed with infusion?     Tysabri is approved with [employer] from 2/14/25 to 2/14/26 at [facility] infusion center. Auth # [id number].     Tysabri TOUCH auth valid until 7/31/25 at [facility] infusion [place].

## 2025-06-17 ENCOUNTER — DOCUMENTATION (OUTPATIENT)
Dept: PAIN MEDICINE | Facility: CLINIC | Age: 40
End: 2025-06-17

## 2025-06-27 ENCOUNTER — TELEPHONE (OUTPATIENT)
Dept: NEUROLOGY | Facility: CLINIC | Age: 40
End: 2025-06-27

## 2025-06-27 DIAGNOSIS — G35 MS (MULTIPLE SCLEROSIS) (HCC): Primary | ICD-10-CM

## 2025-06-27 RX ORDER — ACETAMINOPHEN 325 MG/1
650 TABLET ORAL ONCE
OUTPATIENT
Start: 2025-07-09 | End: 2025-07-09

## 2025-06-27 RX ORDER — SODIUM CHLORIDE 9 MG/ML
20 INJECTION, SOLUTION INTRAVENOUS ONCE
OUTPATIENT
Start: 2025-07-09

## 2025-07-01 ENCOUNTER — TELEPHONE (OUTPATIENT)
Dept: NEUROLOGY | Facility: CLINIC | Age: 40
End: 2025-07-01

## 2025-07-01 NOTE — TELEPHONE ENCOUNTER
New PA is needed for [facility] infusion center NPI and Tax ID have changed. PA faxed with clinicals. Awaiting determination.

## 2025-07-03 NOTE — TELEPHONE ENCOUNTER
Received voicemail from Eddi with P. Current auth on file is still valid even though NPI and Tax ID are changing as the site is still par with plan. No additional review is needed at this time.

## 2025-07-09 ENCOUNTER — HOSPITAL ENCOUNTER (OUTPATIENT)
Dept: INFUSION CENTER | Facility: CLINIC | Age: 40
Discharge: HOME | End: 2025-07-09
Attending: PSYCHIATRY & NEUROLOGY
Payer: COMMERCIAL

## 2025-07-09 VITALS
HEART RATE: 68 BPM | DIASTOLIC BLOOD PRESSURE: 73 MMHG | SYSTOLIC BLOOD PRESSURE: 114 MMHG | TEMPERATURE: 98.1 F | RESPIRATION RATE: 18 BRPM

## 2025-07-09 DIAGNOSIS — G35 MS (MULTIPLE SCLEROSIS) (HCC): Primary | ICD-10-CM

## 2025-07-09 PROCEDURE — 96367 TX/PROPH/DG ADDL SEQ IV INF: CPT

## 2025-07-09 PROCEDURE — 96365 THER/PROPH/DIAG IV INF INIT: CPT

## 2025-07-09 RX ORDER — SODIUM CHLORIDE 9 MG/ML
20 INJECTION, SOLUTION INTRAVENOUS ONCE
Status: COMPLETED | OUTPATIENT
Start: 2025-07-09 | End: 2025-07-09

## 2025-07-09 RX ORDER — SODIUM CHLORIDE 9 MG/ML
20 INJECTION, SOLUTION INTRAVENOUS ONCE
OUTPATIENT
Start: 2025-08-06

## 2025-07-09 RX ORDER — ACETAMINOPHEN 325 MG/1
650 TABLET ORAL ONCE
OUTPATIENT
Start: 2025-08-06 | End: 2025-08-06

## 2025-07-09 RX ORDER — ACETAMINOPHEN 325 MG/1
650 TABLET ORAL ONCE
Status: COMPLETED | OUTPATIENT
Start: 2025-07-09 | End: 2025-07-09

## 2025-07-09 RX ADMIN — ACETAMINOPHEN 650 MG: 325 TABLET ORAL at 14:14

## 2025-07-09 RX ADMIN — SODIUM CHLORIDE 20 ML/HR: 0.9 INJECTION, SOLUTION INTRAVENOUS at 14:15

## 2025-07-09 RX ADMIN — DIPHENHYDRAMINE HYDROCHLORIDE 50 MG: 50 INJECTION INTRAMUSCULAR; INTRAVENOUS at 14:18

## 2025-07-09 RX ADMIN — NATALIZUMAB 300 MG: 300 INJECTION INTRAVENOUS at 15:01

## 2025-07-09 NOTE — PROGRESS NOTES
Patient arrives to infusion center for Tysabri infusion. Patient offers no complaints today. PIV established. Patient tolerated entire infusion without complication. 1 hour observation period surpassed without incident. PIV removed. AVS offered.     Next appointment: 8/6/25 @ 1500

## 2025-07-24 ENCOUNTER — TELEPHONE (OUTPATIENT)
Dept: NEUROLOGY | Facility: CLINIC | Age: 40
End: 2025-07-24

## 2025-07-30 ENCOUNTER — TELEPHONE (OUTPATIENT)
Dept: NEUROLOGY | Facility: CLINIC | Age: 40
End: 2025-07-30

## 2025-08-04 ENCOUNTER — TELEMEDICINE (OUTPATIENT)
Dept: NEUROLOGY | Facility: CLINIC | Age: 40
End: 2025-08-04
Payer: COMMERCIAL

## 2025-08-04 ENCOUNTER — TELEPHONE (OUTPATIENT)
Dept: NEUROLOGY | Facility: CLINIC | Age: 40
End: 2025-08-04

## 2025-08-04 DIAGNOSIS — G43.009 MIGRAINE WITHOUT AURA AND WITHOUT STATUS MIGRAINOSUS, NOT INTRACTABLE: ICD-10-CM

## 2025-08-04 DIAGNOSIS — R25.2 MUSCLE CRAMPING: ICD-10-CM

## 2025-08-04 DIAGNOSIS — F41.8 DEPRESSION WITH ANXIETY: ICD-10-CM

## 2025-08-04 DIAGNOSIS — D50.9 IRON DEFICIENCY ANEMIA: ICD-10-CM

## 2025-08-04 DIAGNOSIS — G35 MS (MULTIPLE SCLEROSIS) (HCC): Primary | ICD-10-CM

## 2025-08-04 PROCEDURE — 99214 OFFICE O/P EST MOD 30 MIN: CPT

## 2025-08-07 ENCOUNTER — TELEPHONE (OUTPATIENT)
Dept: NEUROLOGY | Facility: CLINIC | Age: 40
End: 2025-08-07

## 2025-08-07 DIAGNOSIS — G35 MS (MULTIPLE SCLEROSIS) (HCC): Primary | ICD-10-CM

## 2025-08-07 RX ORDER — SODIUM CHLORIDE 9 MG/ML
20 INJECTION, SOLUTION INTRAVENOUS ONCE
OUTPATIENT
Start: 2025-08-18

## 2025-08-07 RX ORDER — ACETAMINOPHEN 325 MG/1
650 TABLET ORAL ONCE
OUTPATIENT
Start: 2025-08-18 | End: 2025-08-18

## 2025-08-15 ENCOUNTER — TELEPHONE (OUTPATIENT)
Dept: INFUSION CENTER | Facility: CLINIC | Age: 40
End: 2025-08-15

## 2025-08-18 ENCOUNTER — HOSPITAL ENCOUNTER (OUTPATIENT)
Dept: INFUSION CENTER | Facility: CLINIC | Age: 40
Discharge: HOME | End: 2025-08-18
Attending: PSYCHIATRY & NEUROLOGY
Payer: COMMERCIAL

## 2025-08-18 VITALS
HEART RATE: 63 BPM | DIASTOLIC BLOOD PRESSURE: 64 MMHG | TEMPERATURE: 96.7 F | RESPIRATION RATE: 18 BRPM | SYSTOLIC BLOOD PRESSURE: 103 MMHG

## 2025-08-18 DIAGNOSIS — G35 MS (MULTIPLE SCLEROSIS) (HCC): Primary | ICD-10-CM

## 2025-08-18 PROCEDURE — 96365 THER/PROPH/DIAG IV INF INIT: CPT

## 2025-08-18 PROCEDURE — 96367 TX/PROPH/DG ADDL SEQ IV INF: CPT

## 2025-08-18 RX ORDER — ACETAMINOPHEN 325 MG/1
650 TABLET ORAL ONCE
Status: COMPLETED | OUTPATIENT
Start: 2025-08-18 | End: 2025-08-18

## 2025-08-18 RX ORDER — SODIUM CHLORIDE 9 MG/ML
20 INJECTION, SOLUTION INTRAVENOUS ONCE
Status: COMPLETED | OUTPATIENT
Start: 2025-08-18 | End: 2025-08-18

## 2025-08-18 RX ORDER — ACETAMINOPHEN 325 MG/1
650 TABLET ORAL ONCE
OUTPATIENT
Start: 2025-09-29 | End: 2025-09-29

## 2025-08-18 RX ORDER — SODIUM CHLORIDE 9 MG/ML
20 INJECTION, SOLUTION INTRAVENOUS ONCE
OUTPATIENT
Start: 2025-09-29

## 2025-08-18 RX ADMIN — ACETAMINOPHEN 650 MG: 325 TABLET ORAL at 14:28

## 2025-08-18 RX ADMIN — DIPHENHYDRAMINE HYDROCHLORIDE 50 MG: 50 INJECTION INTRAMUSCULAR; INTRAVENOUS at 14:28

## 2025-08-18 RX ADMIN — SODIUM CHLORIDE 20 ML/HR: 0.9 INJECTION, SOLUTION INTRAVENOUS at 14:29

## 2025-08-18 RX ADMIN — NATALIZUMAB 300 MG: 300 INJECTION INTRAVENOUS at 15:11

## 2025-08-20 ENCOUNTER — TELEPHONE (OUTPATIENT)
Age: 40
End: 2025-08-20

## 2025-08-20 DIAGNOSIS — R25.2 MUSCLE CRAMPING: ICD-10-CM

## 2025-08-20 DIAGNOSIS — G35 MS (MULTIPLE SCLEROSIS) (HCC): ICD-10-CM

## 2025-08-20 RX ORDER — METHOCARBAMOL 750 MG/1
750 TABLET, FILM COATED ORAL EVERY 6 HOURS PRN
Qty: 120 TABLET | Refills: 0 | Status: SHIPPED | OUTPATIENT
Start: 2025-08-20

## 2025-08-20 RX ORDER — GABAPENTIN 600 MG/1
1200 TABLET ORAL
Qty: 180 TABLET | Refills: 1 | Status: SHIPPED | OUTPATIENT
Start: 2025-08-20